# Patient Record
Sex: FEMALE | Race: BLACK OR AFRICAN AMERICAN | NOT HISPANIC OR LATINO | Employment: FULL TIME | URBAN - METROPOLITAN AREA
[De-identification: names, ages, dates, MRNs, and addresses within clinical notes are randomized per-mention and may not be internally consistent; named-entity substitution may affect disease eponyms.]

---

## 2017-01-05 ENCOUNTER — LAB VISIT (OUTPATIENT)
Dept: LAB | Facility: HOSPITAL | Age: 52
End: 2017-01-05
Payer: COMMERCIAL

## 2017-01-05 ENCOUNTER — INFUSION (OUTPATIENT)
Dept: INFUSION THERAPY | Facility: HOSPITAL | Age: 52
End: 2017-01-05
Attending: INTERNAL MEDICINE
Payer: COMMERCIAL

## 2017-01-05 VITALS
HEART RATE: 76 BPM | SYSTOLIC BLOOD PRESSURE: 152 MMHG | TEMPERATURE: 98 F | DIASTOLIC BLOOD PRESSURE: 91 MMHG | RESPIRATION RATE: 18 BRPM

## 2017-01-05 DIAGNOSIS — C25.2 MALIGNANT NEOPLASM OF TAIL OF PANCREAS: Primary | ICD-10-CM

## 2017-01-05 DIAGNOSIS — C25.2 MALIGNANT NEOPLASM OF TAIL OF PANCREAS: ICD-10-CM

## 2017-01-05 DIAGNOSIS — Z09 CHEMOTHERAPY FOLLOW-UP EXAMINATION: ICD-10-CM

## 2017-01-05 LAB
ALBUMIN SERPL BCP-MCNC: 3.2 G/DL
ALP SERPL-CCNC: 66 U/L
ALT SERPL W/O P-5'-P-CCNC: 13 U/L
ANION GAP SERPL CALC-SCNC: 7 MMOL/L
AST SERPL-CCNC: 22 U/L
BILIRUB SERPL-MCNC: 0.4 MG/DL
BUN SERPL-MCNC: 12 MG/DL
CALCIUM SERPL-MCNC: 9.4 MG/DL
CANCER AG19-9 SERPL-ACNC: ABNORMAL U/ML
CHLORIDE SERPL-SCNC: 103 MMOL/L
CO2 SERPL-SCNC: 30 MMOL/L
CREAT SERPL-MCNC: 0.7 MG/DL
ERYTHROCYTE [DISTWIDTH] IN BLOOD BY AUTOMATED COUNT: 17.1 %
EST. GFR  (AFRICAN AMERICAN): >60 ML/MIN/1.73 M^2
EST. GFR  (NON AFRICAN AMERICAN): >60 ML/MIN/1.73 M^2
GLUCOSE SERPL-MCNC: 96 MG/DL
HCT VFR BLD AUTO: 33.7 %
HGB BLD-MCNC: 11.1 G/DL
MCH RBC QN AUTO: 28.5 PG
MCHC RBC AUTO-ENTMCNC: 32.9 %
MCV RBC AUTO: 87 FL
NEUTROPHILS # BLD AUTO: 2.2 K/UL
PLATELET # BLD AUTO: 211 K/UL
PMV BLD AUTO: 9 FL
POTASSIUM SERPL-SCNC: 4.5 MMOL/L
PROT SERPL-MCNC: 7.2 G/DL
RBC # BLD AUTO: 3.89 M/UL
SODIUM SERPL-SCNC: 140 MMOL/L
WBC # BLD AUTO: 4.42 K/UL

## 2017-01-05 PROCEDURE — 36415 COLL VENOUS BLD VENIPUNCTURE: CPT

## 2017-01-05 PROCEDURE — 86301 IMMUNOASSAY TUMOR CA 19-9: CPT

## 2017-01-05 PROCEDURE — 85027 COMPLETE CBC AUTOMATED: CPT

## 2017-01-05 PROCEDURE — 80053 COMPREHEN METABOLIC PANEL: CPT

## 2017-01-05 PROCEDURE — 96367 TX/PROPH/DG ADDL SEQ IV INF: CPT

## 2017-01-05 PROCEDURE — 96417 CHEMO IV INFUS EACH ADDL SEQ: CPT

## 2017-01-05 PROCEDURE — 25000003 PHARM REV CODE 250: Performed by: INTERNAL MEDICINE

## 2017-01-05 PROCEDURE — 96413 CHEMO IV INFUSION 1 HR: CPT

## 2017-01-05 PROCEDURE — 63600175 PHARM REV CODE 636 W HCPCS: Performed by: INTERNAL MEDICINE

## 2017-01-05 RX ORDER — SODIUM CHLORIDE 0.9 % (FLUSH) 0.9 %
10 SYRINGE (ML) INJECTION
Status: DISCONTINUED | OUTPATIENT
Start: 2017-01-05 | End: 2017-01-05 | Stop reason: HOSPADM

## 2017-01-05 RX ORDER — HEPARIN 100 UNIT/ML
500 SYRINGE INTRAVENOUS
Status: DISCONTINUED | OUTPATIENT
Start: 2017-01-05 | End: 2017-01-05 | Stop reason: HOSPADM

## 2017-01-05 RX ADMIN — GEMCITABINE HYDROCHLORIDE 1860 MG: 200 INJECTION, POWDER, LYOPHILIZED, FOR SOLUTION INTRAVENOUS at 05:01

## 2017-01-05 RX ADMIN — PACLITAXEL 230 MG: 100 INJECTION, POWDER, LYOPHILIZED, FOR SUSPENSION INTRAVENOUS at 04:01

## 2017-01-05 RX ADMIN — HEPARIN 500 UNITS: 100 SYRINGE at 05:01

## 2017-01-05 RX ADMIN — SODIUM CHLORIDE: 0.9 INJECTION, SOLUTION INTRAVENOUS at 03:01

## 2017-01-05 RX ADMIN — GRANISETRON HYDROCHLORIDE 1 MG: 0.1 INJECTION, SOLUTION INTRAVENOUS at 03:01

## 2017-01-05 RX ADMIN — SODIUM CHLORIDE, PRESERVATIVE FREE 10 ML: 5 INJECTION INTRAVENOUS at 05:01

## 2017-01-05 NOTE — PLAN OF CARE
Problem: Patient Care Overview (Adult)  Goal: Plan of Care Review  Outcome: Ongoing (interventions implemented as appropriate)  Patient tolerated treatment well. Discharged without complaints or S/S of adverse event. AVS decline. Reviewed how to best take antiemetics.  Instructed to call provider for any questions or concerns.

## 2017-01-05 NOTE — PLAN OF CARE
Problem: Patient Care Overview (Adult)  Goal: Adult Individualization and Mutuality  PAC- no blood return- port study okay   Outcome: Ongoing (interventions implemented as appropriate)  .Labs , hx, and medications reviewed. Assessment completed. Discussed plan of care with patient. Patient in agreement. Chair reclined and warm blanket and snack offered.

## 2017-01-06 ENCOUNTER — INFUSION (OUTPATIENT)
Dept: INFUSION THERAPY | Facility: HOSPITAL | Age: 52
End: 2017-01-06
Attending: INTERNAL MEDICINE
Payer: COMMERCIAL

## 2017-01-06 DIAGNOSIS — C25.2 MALIGNANT NEOPLASM OF TAIL OF PANCREAS: Primary | ICD-10-CM

## 2017-01-06 PROCEDURE — 96372 THER/PROPH/DIAG INJ SC/IM: CPT

## 2017-01-06 PROCEDURE — 63600175 PHARM REV CODE 636 W HCPCS: Performed by: INTERNAL MEDICINE

## 2017-01-06 RX ADMIN — FILGRASTIM 480 MCG: 480 INJECTION, SOLUTION INTRAVENOUS; SUBCUTANEOUS at 02:01

## 2017-01-10 ENCOUNTER — TELEPHONE (OUTPATIENT)
Dept: GENETICS | Facility: CLINIC | Age: 52
End: 2017-01-10

## 2017-01-10 DIAGNOSIS — R11.0 NAUSEA: ICD-10-CM

## 2017-01-10 DIAGNOSIS — C25.0 MALIGNANT NEOPLASM OF HEAD OF PANCREAS: ICD-10-CM

## 2017-01-10 DIAGNOSIS — C25.2 MALIGNANT NEOPLASM OF TAIL OF PANCREAS: Primary | ICD-10-CM

## 2017-01-10 DIAGNOSIS — R52 PAIN: ICD-10-CM

## 2017-01-10 RX ORDER — ONDANSETRON HYDROCHLORIDE 8 MG/1
8 TABLET, FILM COATED ORAL EVERY 8 HOURS PRN
Qty: 60 TABLET | Refills: 2 | Status: SHIPPED | OUTPATIENT
Start: 2017-01-10 | End: 2018-01-10

## 2017-01-10 RX ORDER — MORPHINE SULFATE 100 MG/1
100 TABLET, FILM COATED, EXTENDED RELEASE ORAL 2 TIMES DAILY
Qty: 60 TABLET | Refills: 0 | Status: SHIPPED | OUTPATIENT
Start: 2017-01-10 | End: 2017-01-10 | Stop reason: SDUPTHER

## 2017-01-10 RX ORDER — MORPHINE SULFATE 100 MG/1
100 TABLET, FILM COATED, EXTENDED RELEASE ORAL 2 TIMES DAILY
Qty: 60 TABLET | Refills: 0 | OUTPATIENT
Start: 2017-01-10

## 2017-01-10 RX ORDER — ONDANSETRON HYDROCHLORIDE 8 MG/1
8 TABLET, FILM COATED ORAL EVERY 8 HOURS PRN
Qty: 60 TABLET | Refills: 2 | OUTPATIENT
Start: 2017-01-10 | End: 2018-01-10

## 2017-01-10 RX ORDER — MORPHINE SULFATE 100 MG/1
100 TABLET, FILM COATED, EXTENDED RELEASE ORAL 2 TIMES DAILY
Qty: 60 TABLET | Refills: 0 | Status: SHIPPED | OUTPATIENT
Start: 2017-01-10 | End: 2017-02-14 | Stop reason: SDUPTHER

## 2017-01-10 NOTE — TELEPHONE ENCOUNTER
Spoke to the patient to disclose results of the pancreatic cancer panel. 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 We will discuss this result in more detail during our appt on 1/17 at 2pm.

## 2017-01-11 ENCOUNTER — LAB VISIT (OUTPATIENT)
Dept: LAB | Facility: HOSPITAL | Age: 52
End: 2017-01-11
Attending: INTERNAL MEDICINE
Payer: COMMERCIAL

## 2017-01-11 ENCOUNTER — OFFICE VISIT (OUTPATIENT)
Dept: HEMATOLOGY/ONCOLOGY | Facility: CLINIC | Age: 52
End: 2017-01-11
Payer: COMMERCIAL

## 2017-01-11 VITALS
HEART RATE: 80 BPM | WEIGHT: 158 LBS | DIASTOLIC BLOOD PRESSURE: 70 MMHG | SYSTOLIC BLOOD PRESSURE: 118 MMHG | TEMPERATURE: 98 F | BODY MASS INDEX: 26.98 KG/M2 | HEIGHT: 64 IN

## 2017-01-11 DIAGNOSIS — C25.2 MALIGNANT NEOPLASM OF TAIL OF PANCREAS: ICD-10-CM

## 2017-01-11 DIAGNOSIS — Z09 CHEMOTHERAPY FOLLOW-UP EXAMINATION: ICD-10-CM

## 2017-01-11 DIAGNOSIS — C25.2 MALIGNANT NEOPLASM OF TAIL OF PANCREAS: Primary | ICD-10-CM

## 2017-01-11 LAB
ALBUMIN SERPL BCP-MCNC: 3.3 G/DL
ALP SERPL-CCNC: 67 U/L
ALT SERPL W/O P-5'-P-CCNC: 17 U/L
ANION GAP SERPL CALC-SCNC: 6 MMOL/L
AST SERPL-CCNC: 21 U/L
BILIRUB SERPL-MCNC: 0.5 MG/DL
BUN SERPL-MCNC: 14 MG/DL
CALCIUM SERPL-MCNC: 9.6 MG/DL
CHLORIDE SERPL-SCNC: 100 MMOL/L
CO2 SERPL-SCNC: 31 MMOL/L
CREAT SERPL-MCNC: 0.7 MG/DL
ERYTHROCYTE [DISTWIDTH] IN BLOOD BY AUTOMATED COUNT: 16.8 %
EST. GFR  (AFRICAN AMERICAN): >60 ML/MIN/1.73 M^2
EST. GFR  (NON AFRICAN AMERICAN): >60 ML/MIN/1.73 M^2
GLUCOSE SERPL-MCNC: 119 MG/DL
HCT VFR BLD AUTO: 31.9 %
HGB BLD-MCNC: 10.7 G/DL
MCH RBC QN AUTO: 28.9 PG
MCHC RBC AUTO-ENTMCNC: 33.5 %
MCV RBC AUTO: 86 FL
NEUTROPHILS # BLD AUTO: 2.7 K/UL
PLATELET # BLD AUTO: 146 K/UL
PMV BLD AUTO: 9.2 FL
POTASSIUM SERPL-SCNC: 4.2 MMOL/L
PROT SERPL-MCNC: 7.1 G/DL
RBC # BLD AUTO: 3.7 M/UL
SODIUM SERPL-SCNC: 137 MMOL/L
WBC # BLD AUTO: 4.79 K/UL

## 2017-01-11 PROCEDURE — 3074F SYST BP LT 130 MM HG: CPT | Mod: S$GLB,,, | Performed by: INTERNAL MEDICINE

## 2017-01-11 PROCEDURE — 80053 COMPREHEN METABOLIC PANEL: CPT

## 2017-01-11 PROCEDURE — 99214 OFFICE O/P EST MOD 30 MIN: CPT | Mod: S$GLB,,, | Performed by: INTERNAL MEDICINE

## 2017-01-11 PROCEDURE — 85027 COMPLETE CBC AUTOMATED: CPT

## 2017-01-11 PROCEDURE — 99999 PR PBB SHADOW E&M-EST. PATIENT-LVL III: CPT | Mod: PBBFAC,,, | Performed by: INTERNAL MEDICINE

## 2017-01-11 PROCEDURE — 36415 COLL VENOUS BLD VENIPUNCTURE: CPT

## 2017-01-11 PROCEDURE — 3078F DIAST BP <80 MM HG: CPT | Mod: S$GLB,,, | Performed by: INTERNAL MEDICINE

## 2017-01-11 PROCEDURE — 1159F MED LIST DOCD IN RCRD: CPT | Mod: S$GLB,,, | Performed by: INTERNAL MEDICINE

## 2017-01-11 RX ORDER — HYDROMORPHONE HYDROCHLORIDE 4 MG/1
4 TABLET ORAL EVERY 6 HOURS PRN
Qty: 90 TABLET | Refills: 0 | Status: ON HOLD | OUTPATIENT
Start: 2017-01-11 | End: 2017-03-08 | Stop reason: HOSPADM

## 2017-01-11 RX ORDER — PROMETHAZINE HYDROCHLORIDE 25 MG/1
25 TABLET ORAL EVERY 6 HOURS PRN
Qty: 30 TABLET | Refills: 1 | Status: SHIPPED | OUTPATIENT
Start: 2017-01-11

## 2017-01-11 NOTE — PROGRESS NOTES
PATIENT: Camilo Johnson  MRN: [id number]  DATE: 2017      Diagnosis:   1. Malignant neoplasm of tail of pancreas    2. 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 Johnson is a 51 y.o. female who returns for follow up for pancreatic cancer.  She is a former patient of Dr. Fontenot.  She is currently on treatment with gemcitabine and nab paclitaxel.  She complains of some ongoing abdominal pain and bloating.  This is relieved with Dilaudid, however, sometimes dilated wears off too fast.  Additionally she complains of some numbness to her toes.  No other new complaints.      Past Medical History:   Past Medical History   Diagnosis Date    Chemotherapy follow-up examination 2016    HTN (hypertension)     Pancreatic mass     Sickle cell trait        Past Surgical HIstory:   Past Surgical History   Procedure Laterality Date     section      Tubal ligation      Breast surgery       breast reduction       Family History:   Family History   Problem Relation Age of Onset    Diabetes Mother     Stroke Father     Hypertension Father     Heart disease Father     Hypertension Brother     Diabetes Brother     Hypertension Brother        Social History:  reports that she has never smoked. She has never used smokeless tobacco. She reports that she drinks alcohol. She reports that she does not use illicit drugs.    Allergies:  Review of patient's allergies indicates:  No Known Allergies    Medications:  Current Outpatient Prescriptions   Medication Sig Dispense Refill    gabapentin (NEURONTIN) 300 MG capsule Take 1 capsule (300 mg total) by mouth 2 (two) times daily. 60 capsule 5  "   HYDROmorphone (DILAUDID) 4 MG tablet Take 1 tablet (4 mg total) by mouth every 6 (six) hours as needed for Pain. 90 tablet 0    morphine (MS CONTIN) 100 MG 12 hr tablet Take 1 tablet (100 mg total) by mouth 2 (two) times daily. 60 tablet 0    ondansetron (ZOFRAN) 8 MG tablet Take 1 tablet (8 mg total) by mouth every 8 (eight) hours as needed for Nausea. 60 tablet 2    promethazine (PHENERGAN) 25 MG tablet Take 1 tablet (25 mg total) by mouth every 6 (six) hours as needed for Nausea. 30 tablet 1    vitamin D3-folic acid 5,000-1 unit-mg Tab Take by mouth once daily.       No current facility-administered medications for this visit.        Review of Systems   Constitutional: Negative for chills, fever and unexpected weight change.   HENT: Negative for congestion, hearing loss and nosebleeds.    Eyes: Negative for visual disturbance.   Respiratory: Negative for cough and shortness of breath.    Cardiovascular: Negative for chest pain and palpitations.   Gastrointestinal: Positive for abdominal pain. Negative for blood in stool, constipation, diarrhea, nausea and vomiting.   Genitourinary: Negative for dysuria.   Musculoskeletal: Negative for back pain and gait problem.   Skin: Negative for color change and rash.   Neurological: Positive for numbness. Negative for dizziness, weakness and headaches.   Hematological: Negative for adenopathy. Does not bruise/bleed easily.   Psychiatric/Behavioral: Negative for confusion.       ECOG Performance Status:   ECOG SCORE    1 - Restricted in strenuous activity-ambulatory and able to carry out work of a light nature          Objective:      Vitals:   Vitals:    01/11/17 1309   BP: 118/70   Pulse: 80   Temp: 98 °F (36.7 °C)   Weight: 71.7 kg (158 lb)   Height: 5' 4" (1.626 m)     BMI: Body mass index is 27.12 kg/(m^2).    Physical Exam   Constitutional: She is oriented to person, place, and time. She appears well-developed and well-nourished. No distress.   HENT:   Head: " Normocephalic.   Mouth/Throat: No oropharyngeal exudate.   Eyes: EOM are normal. No scleral icterus.   Neck: Neck supple. No tracheal deviation present. No thyromegaly present.   Cardiovascular: Normal rate and regular rhythm.    Pulmonary/Chest: Effort normal and breath sounds normal. No respiratory distress. She has no wheezes. She has no rales.   Abdominal: Soft. She exhibits no distension and no mass. There is tenderness. There is no rebound and no guarding.   Musculoskeletal: Normal range of motion. She exhibits no edema.   Lymphadenopathy:     She has no cervical adenopathy.   Neurological: She is alert and oriented to person, place, and time. No cranial nerve deficit.   Skin: Skin is warm and dry.   Psychiatric: She has a normal mood and affect.       Laboratory Data:  Lab Visit on 01/11/2017   Component Date Value Ref Range Status    Sodium 01/11/2017 137  136 - 145 mmol/L Final    Potassium 01/11/2017 4.2  3.5 - 5.1 mmol/L Final    Chloride 01/11/2017 100  95 - 110 mmol/L Final    CO2 01/11/2017 31* 23 - 29 mmol/L Final    Glucose 01/11/2017 119* 70 - 110 mg/dL Final    BUN, Bld 01/11/2017 14  6 - 20 mg/dL Final    Creatinine 01/11/2017 0.7  0.5 - 1.4 mg/dL Final    Calcium 01/11/2017 9.6  8.7 - 10.5 mg/dL Final    Total Protein 01/11/2017 7.1  6.0 - 8.4 g/dL Final    Albumin 01/11/2017 3.3* 3.5 - 5.2 g/dL Final    Total Bilirubin 01/11/2017 0.5  0.1 - 1.0 mg/dL Final    Comment: For infants and newborns, interpretation of results should be based  on gestational age, weight and in agreement with clinical  observations.  Premature Infant recommended reference ranges:  Up to 24 hours.............<8.0 mg/dL  Up to 48 hours............<12.0 mg/dL  3-5 days..................<15.0 mg/dL  6-29 days.................<15.0 mg/dL      Alkaline Phosphatase 01/11/2017 67  55 - 135 U/L Final    AST 01/11/2017 21  10 - 40 U/L Final    ALT 01/11/2017 17  10 - 44 U/L Final    Anion Gap 01/11/2017 6* 8 - 16  mmol/L Final    eGFR if African American 01/11/2017 >60.0  >60 mL/min/1.73 m^2 Final    eGFR if non African American 01/11/2017 >60.0  >60 mL/min/1.73 m^2 Final    Comment: Calculation used to obtain the estimated glomerular filtration  rate (eGFR) is the CKD-EPI equation. Since race is unknown   in our information system, the eGFR values for   -American and Non--American patients are given   for each creatinine result.      WBC 01/11/2017 4.79  3.90 - 12.70 K/uL Final    RBC 01/11/2017 3.70* 4.00 - 5.40 M/uL Final    Hemoglobin 01/11/2017 10.7* 12.0 - 16.0 g/dL Final    Hematocrit 01/11/2017 31.9* 37.0 - 48.5 % Final    MCV 01/11/2017 86  82 - 98 fL Final    MCH 01/11/2017 28.9  27.0 - 31.0 pg Final    MCHC 01/11/2017 33.5  32.0 - 36.0 % Final    RDW 01/11/2017 16.8* 11.5 - 14.5 % Final    Platelets 01/11/2017 146* 150 - 350 K/uL Final    MPV 01/11/2017 9.2  9.2 - 12.9 fL Final    Gran # 01/11/2017 2.7  1.8 - 7.7 K/uL Final    Comment: The ANC is based on a white cell differential from an   automated cell counter. It has not been microscopically   reviewed for the presence of abnormal cells. Clinical   correlation is required.     Lab Visit on 01/05/2017   Component Date Value Ref Range Status    Sodium 01/05/2017 140  136 - 145 mmol/L Final    Potassium 01/05/2017 4.5  3.5 - 5.1 mmol/L Final    Chloride 01/05/2017 103  95 - 110 mmol/L Final    CO2 01/05/2017 30* 23 - 29 mmol/L Final    Glucose 01/05/2017 96  70 - 110 mg/dL Final    BUN, Bld 01/05/2017 12  6 - 20 mg/dL Final    Creatinine 01/05/2017 0.7  0.5 - 1.4 mg/dL Final    Calcium 01/05/2017 9.4  8.7 - 10.5 mg/dL Final    Total Protein 01/05/2017 7.2  6.0 - 8.4 g/dL Final    Albumin 01/05/2017 3.2* 3.5 - 5.2 g/dL Final    Total Bilirubin 01/05/2017 0.4  0.1 - 1.0 mg/dL Final    Comment: For infants and newborns, interpretation of results should be based  on gestational age, weight and in agreement with  clinical  observations.  Premature Infant recommended reference ranges:  Up to 24 hours.............<8.0 mg/dL  Up to 48 hours............<12.0 mg/dL  3-5 days..................<15.0 mg/dL  6-29 days.................<15.0 mg/dL      Alkaline Phosphatase 01/05/2017 66  55 - 135 U/L Final    AST 01/05/2017 22  10 - 40 U/L Final    ALT 01/05/2017 13  10 - 44 U/L Final    Anion Gap 01/05/2017 7* 8 - 16 mmol/L Final    eGFR if African American 01/05/2017 >60.0  >60 mL/min/1.73 m^2 Final    eGFR if non African American 01/05/2017 >60.0  >60 mL/min/1.73 m^2 Final    Comment: Calculation used to obtain the estimated glomerular filtration  rate (eGFR) is the CKD-EPI equation. Since race is unknown   in our information system, the eGFR values for   -American and Non--American patients are given   for each creatinine result.      WBC 01/05/2017 4.42  3.90 - 12.70 K/uL Final    RBC 01/05/2017 3.89* 4.00 - 5.40 M/uL Final    Hemoglobin 01/05/2017 11.1* 12.0 - 16.0 g/dL Final    Hematocrit 01/05/2017 33.7* 37.0 - 48.5 % Final    MCV 01/05/2017 87  82 - 98 fL Final    MCH 01/05/2017 28.5  27.0 - 31.0 pg Final    MCHC 01/05/2017 32.9  32.0 - 36.0 % Final    RDW 01/05/2017 17.1* 11.5 - 14.5 % Final    Platelets 01/05/2017 211  150 - 350 K/uL Final    MPV 01/05/2017 9.0* 9.2 - 12.9 fL Final    Gran # 01/05/2017 2.2  1.8 - 7.7 K/uL Final    Comment: The ANC is based on a white cell differential from an   automated cell counter. It has not been microscopically   reviewed for the presence of abnormal cells. Clinical   correlation is required.      CA 19-9 01/05/2017 >71014.0* 2.0 - 40.0 U/mL Final         Imaging:        Assessment:       1. Malignant neoplasm of tail of pancreas    2. Chemotherapy follow-up examination           Plan:      Mrs. Johnson has ongoing pain in the abdomen and will increase the use of breakthrough medication.  We may need to go up on her long-acting morphine.  Her labs are  acceptable for chemotherapy tomorrow.  I have noted an increase in her CA-19-9 since October 2016.  This is concerning for progression.  I had to repeat a CT of the abdomen next week and see back at that time.  We'll discuss with our phase I division if there are clinical trials available.  We have had an extensive discussion about clinical trials and the different phases.  She will increase Neurontin to 300 mg 3 times a day.  All questions were answered and she is agreeable with this plan.      Sebas Zaman DO, FACP  Hematology & Oncology  [facility] [place] [address]  ph. [phone]  Fax. [phone]    25 minutes were spent in coordination of patient's care, record review and counseling.  More than 50% of the time was face-to-face.

## 2017-01-12 ENCOUNTER — INFUSION (OUTPATIENT)
Dept: INFUSION THERAPY | Facility: HOSPITAL | Age: 52
End: 2017-01-12
Attending: INTERNAL MEDICINE
Payer: COMMERCIAL

## 2017-01-12 VITALS
DIASTOLIC BLOOD PRESSURE: 87 MMHG | HEART RATE: 88 BPM | SYSTOLIC BLOOD PRESSURE: 124 MMHG | RESPIRATION RATE: 18 BRPM | TEMPERATURE: 98 F

## 2017-01-12 DIAGNOSIS — C25.2 MALIGNANT NEOPLASM OF TAIL OF PANCREAS: Primary | ICD-10-CM

## 2017-01-12 PROCEDURE — 63600175 PHARM REV CODE 636 W HCPCS: Performed by: INTERNAL MEDICINE

## 2017-01-12 PROCEDURE — 96417 CHEMO IV INFUS EACH ADDL SEQ: CPT

## 2017-01-12 PROCEDURE — 25000003 PHARM REV CODE 250: Performed by: INTERNAL MEDICINE

## 2017-01-12 PROCEDURE — 96413 CHEMO IV INFUSION 1 HR: CPT

## 2017-01-12 PROCEDURE — 96367 TX/PROPH/DG ADDL SEQ IV INF: CPT

## 2017-01-12 RX ORDER — HEPARIN 100 UNIT/ML
500 SYRINGE INTRAVENOUS
Status: DISCONTINUED | OUTPATIENT
Start: 2017-01-12 | End: 2017-01-12 | Stop reason: HOSPADM

## 2017-01-12 RX ORDER — SODIUM CHLORIDE 0.9 % (FLUSH) 0.9 %
10 SYRINGE (ML) INJECTION
Status: DISCONTINUED | OUTPATIENT
Start: 2017-01-12 | End: 2017-01-12 | Stop reason: HOSPADM

## 2017-01-12 RX ORDER — ONDANSETRON HCL IN 0.9 % NACL 8 MG/50 ML
8 INTRAVENOUS SOLUTION, PIGGYBACK (ML) INTRAVENOUS
Status: COMPLETED | OUTPATIENT
Start: 2017-01-12 | End: 2017-01-12

## 2017-01-12 RX ADMIN — PACLITAXEL 230 MG: 100 INJECTION, POWDER, LYOPHILIZED, FOR SUSPENSION INTRAVENOUS at 03:01

## 2017-01-12 RX ADMIN — SODIUM CHLORIDE 8 MG: 9 INJECTION, SOLUTION INTRAVENOUS at 02:01

## 2017-01-12 RX ADMIN — SODIUM CHLORIDE, PRESERVATIVE FREE 10 ML: 5 INJECTION INTRAVENOUS at 04:01

## 2017-01-12 RX ADMIN — GEMCITABINE HYDROCHLORIDE 1800 MG: 200 INJECTION, POWDER, LYOPHILIZED, FOR SOLUTION INTRAVENOUS at 03:01

## 2017-01-12 RX ADMIN — HEPARIN 500 UNITS: 100 SYRINGE at 04:01

## 2017-01-12 RX ADMIN — SODIUM CHLORIDE: 0.9 INJECTION, SOLUTION INTRAVENOUS at 02:01

## 2017-01-12 NOTE — PLAN OF CARE
Problem: Patient Care Overview (Adult)  Goal: Plan of Care Review  Outcome: Ongoing (interventions implemented as appropriate)  Tolerated treatment well.  Advised to call MD for any problems or concerns.  AVS given.  RTC on tomorrow for Neupogen injection. NAD noted upon discharge.

## 2017-01-13 ENCOUNTER — PATIENT MESSAGE (OUTPATIENT)
Dept: HEMATOLOGY/ONCOLOGY | Facility: CLINIC | Age: 52
End: 2017-01-13

## 2017-01-13 ENCOUNTER — INFUSION (OUTPATIENT)
Dept: INFUSION THERAPY | Facility: HOSPITAL | Age: 52
End: 2017-01-13
Attending: INTERNAL MEDICINE
Payer: COMMERCIAL

## 2017-01-13 DIAGNOSIS — C25.2 MALIGNANT NEOPLASM OF TAIL OF PANCREAS: Primary | ICD-10-CM

## 2017-01-13 PROCEDURE — 96372 THER/PROPH/DIAG INJ SC/IM: CPT

## 2017-01-13 PROCEDURE — 63600175 PHARM REV CODE 636 W HCPCS: Performed by: INTERNAL MEDICINE

## 2017-01-13 RX ADMIN — FILGRASTIM 480 MCG: 480 INJECTION, SOLUTION INTRAVENOUS; SUBCUTANEOUS at 01:01

## 2017-01-17 ENCOUNTER — OFFICE VISIT (OUTPATIENT)
Dept: GENETICS | Facility: CLINIC | Age: 52
End: 2017-01-17
Payer: COMMERCIAL

## 2017-01-17 ENCOUNTER — HOSPITAL ENCOUNTER (OUTPATIENT)
Dept: RADIOLOGY | Facility: HOSPITAL | Age: 52
Discharge: HOME | End: 2017-01-17
Attending: INTERNAL MEDICINE
Payer: COMMERCIAL

## 2017-01-17 VITALS — HEIGHT: 64 IN | BODY MASS INDEX: 26.98 KG/M2 | WEIGHT: 158.06 LBS

## 2017-01-17 DIAGNOSIS — C25.2 MALIGNANT NEOPLASM OF TAIL OF PANCREAS: ICD-10-CM

## 2017-01-17 DIAGNOSIS — Z09 CHEMOTHERAPY FOLLOW-UP EXAMINATION: ICD-10-CM

## 2017-01-17 DIAGNOSIS — Z80.9 FAMILY HISTORY OF CANCER: ICD-10-CM

## 2017-01-17 DIAGNOSIS — R52 PAIN: ICD-10-CM

## 2017-01-17 DIAGNOSIS — C25.9 MALIGNANT NEOPLASM OF PANCREAS, UNSPECIFIED LOCATION OF MALIGNANCY: Primary | ICD-10-CM

## 2017-01-17 PROCEDURE — 74177 CT ABD & PELVIS W/CONTRAST: CPT | Mod: TC

## 2017-01-17 PROCEDURE — 99999 PR PBB SHADOW E&M-EST. PATIENT-LVL II: CPT | Mod: PBBFAC,,,

## 2017-01-17 PROCEDURE — 99499 UNLISTED E&M SERVICE: CPT | Mod: S$GLB,,, | Performed by: MEDICAL GENETICS

## 2017-01-17 PROCEDURE — 74177 CT ABD & PELVIS W/CONTRAST: CPT | Mod: 26,,, | Performed by: RADIOLOGY

## 2017-01-17 PROCEDURE — 25500020 PHARM REV CODE 255: Performed by: INTERNAL MEDICINE

## 2017-01-17 PROCEDURE — 71260 CT THORAX DX C+: CPT | Mod: 26,,, | Performed by: RADIOLOGY

## 2017-01-17 PROCEDURE — 71260 CT THORAX DX C+: CPT | Mod: TC

## 2017-01-17 PROCEDURE — 96040 PR GENETIC COUNSELING, EACH 30 MIN: CPT | Mod: S$GLB,,, | Performed by: MEDICAL GENETICS

## 2017-01-17 RX ADMIN — IOHEXOL 30 ML: 350 INJECTION, SOLUTION INTRAVENOUS at 10:01

## 2017-01-17 RX ADMIN — IOHEXOL 75 ML: 350 INJECTION, SOLUTION INTRAVENOUS at 12:01

## 2017-01-18 ENCOUNTER — TELEPHONE (OUTPATIENT)
Dept: HEMATOLOGY/ONCOLOGY | Facility: CLINIC | Age: 52
End: 2017-01-18

## 2017-01-18 NOTE — TELEPHONE ENCOUNTER
Let patient know what Dr. Zaman's message was about the scan. Patient verbalized understanding.

## 2017-01-18 NOTE — TELEPHONE ENCOUNTER
----- Message from Sebas Zaman DO, FACBETH sent at 1/17/2017  8:32 PM CST -----  Please let Mrs. Johnson know that her scan appears stable with no new lesions seen.

## 2017-01-18 NOTE — PROGRESS NOTES
Camilo Johnson  DOS: 17  : 69  MRN: [id number]        REFERING MD: Gardenia Fontenot MD     REASON FOR CONSULT: We have seen this 52-year-old  female with a personal history of pancreatic cancer. After our last visit, the 16 gene Pancreatic Cancer Panel was completed. 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 Mrs. Johnson presents with  to discuss the results and our recommendations.      HISTORY OF PRESENT ILLNESS: Mrs. Johnson presented to her Ob/Gyn for an evaluation complaining of back/flank pain. CT scan performed on 3/4/16 identified a mass in the pancreas. The patient was seen at [facility] for a second opinion. Pathology showed poorly differentiated adenocarcinoma. Subsequently imaging studies showed metastasis to the lungs and bone. Mrs. Johnson is closely followed by her oncologist.      Mrs. Johnson reports that menarche was at age 13 and her first child was born when she was 29. The patient is getting clinical breast exams as well as yearly mammograms. The patient has never had a breast biopsy. The last mammogram was performed on 16. Past surgical history includes breast reduction at age 29 and tubal ligation. The patient has never had a colonoscopy.      FAMILY HISTORY: At this visit, the 3 generation pedigree scanned in the patients chart was reviewed.  Mrs. Johnson has 2 daughters (age 22 and 20) and a son (age 7). The patient has 7 full brothers and none of them have a history of cancer. The patients parents are  and they had no history of cancer. On the paternal side, there is an aunt with lung cancer (she was a smoker) and a first cousin with colon cancer (age of diagnosis unknown). The remainder of the family history was not consistent with other cancers. Paternal and maternal ancestry is .      IMPRESSION: At this time, we have reviewed the patients medical and  family history and discussed the results of the Pancreatic Cancer Panel (APC, STACY, BRCA1, BRCA2, CDK4, CDKN2A, EPCAM, MLH1, MSH2, MSH6, PALB2, PMS2, STK11, TP53, VHL, XRCC2) completed at [facility]. 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 Pathogenic mutations in this gene are associated with a susceptibility to cutaneous malignant melanoma and pancreatic cancer (exact risk figures not currently known). The variant identified has been observed by another testing laboratory. As of May 19 2016, this variant is classified as a VUS by two different labs ([facility] and [facility] [facility]) in [employer]. We emphasized that the significance of this variant will likely be delineated in the future.      We discussed autosomal dominant inheritance in detail. Each of Mrs. Hercules children have a 1 in 2 or 50% chance of inheriting the VUS in the CDK4 gene. We recommended that the patient continue her treatment as recommended by her team of medical providers. Mrs. Johnson should follow up with us in a year to explore additional testing options and to get updates on the significance of the variant identified. A copy of original test report, educational material and information on available research studies were provided to the patient and her .  Mrs. Johnson expressed understanding and had all her questions answered to her satisfaction.    Time spent: 60 minutes, more than 50% was spent in counseling. The note is in epic.      RECOMMENDATIONS:   1. Follow up in 1 year.                      Time spent: 60 minutes, more than 50% was spent in counseling. The note is in epic.         Stephen Burch M.D.                                                               Brenda Noriega MS  Section Head - Medical Genetics                                                 Genetic Counselor           [facility]                                                                                        [email or web address]

## 2017-01-23 ENCOUNTER — OFFICE VISIT (OUTPATIENT)
Dept: HEMATOLOGY/ONCOLOGY | Facility: CLINIC | Age: 52
End: 2017-01-23
Payer: COMMERCIAL

## 2017-01-23 ENCOUNTER — HOSPITAL ENCOUNTER (OUTPATIENT)
Dept: VASCULAR SURGERY | Facility: CLINIC | Age: 52
Discharge: HOME | End: 2017-01-23
Attending: INTERNAL MEDICINE
Payer: COMMERCIAL

## 2017-01-23 ENCOUNTER — HOSPITAL ENCOUNTER (OUTPATIENT)
Dept: RADIOLOGY | Facility: HOSPITAL | Age: 52
Discharge: HOME | End: 2017-01-23
Attending: INTERNAL MEDICINE
Payer: COMMERCIAL

## 2017-01-23 VITALS
TEMPERATURE: 99 F | WEIGHT: 162.69 LBS | RESPIRATION RATE: 20 BRPM | HEART RATE: 103 BPM | OXYGEN SATURATION: 98 % | BODY MASS INDEX: 27.77 KG/M2 | SYSTOLIC BLOOD PRESSURE: 135 MMHG | DIASTOLIC BLOOD PRESSURE: 84 MMHG | HEIGHT: 64 IN

## 2017-01-23 DIAGNOSIS — R06.02 SOB (SHORTNESS OF BREATH): ICD-10-CM

## 2017-01-23 DIAGNOSIS — R10.13 EPIGASTRIC PAIN: ICD-10-CM

## 2017-01-23 DIAGNOSIS — M79.89 SWELLING OF LOWER EXTREMITY: ICD-10-CM

## 2017-01-23 DIAGNOSIS — C25.2 MALIGNANT NEOPLASM OF TAIL OF PANCREAS: Primary | ICD-10-CM

## 2017-01-23 DIAGNOSIS — C25.2 MALIGNANT NEOPLASM OF TAIL OF PANCREAS: ICD-10-CM

## 2017-01-23 DIAGNOSIS — Z09 CHEMOTHERAPY FOLLOW-UP EXAMINATION: ICD-10-CM

## 2017-01-23 DIAGNOSIS — R60.9 EDEMA, UNSPECIFIED TYPE: ICD-10-CM

## 2017-01-23 PROCEDURE — 99215 OFFICE O/P EST HI 40 MIN: CPT | Mod: S$GLB,,, | Performed by: INTERNAL MEDICINE

## 2017-01-23 PROCEDURE — 3079F DIAST BP 80-89 MM HG: CPT | Mod: S$GLB,,, | Performed by: INTERNAL MEDICINE

## 2017-01-23 PROCEDURE — 25500020 PHARM REV CODE 255: Performed by: INTERNAL MEDICINE

## 2017-01-23 PROCEDURE — 71275 CT ANGIOGRAPHY CHEST: CPT | Mod: TC

## 2017-01-23 PROCEDURE — 99999 PR PBB SHADOW E&M-EST. PATIENT-LVL IV: CPT | Mod: PBBFAC,,, | Performed by: INTERNAL MEDICINE

## 2017-01-23 PROCEDURE — 3075F SYST BP GE 130 - 139MM HG: CPT | Mod: S$GLB,,, | Performed by: INTERNAL MEDICINE

## 2017-01-23 PROCEDURE — 1159F MED LIST DOCD IN RCRD: CPT | Mod: S$GLB,,, | Performed by: INTERNAL MEDICINE

## 2017-01-23 PROCEDURE — 93970 EXTREMITY STUDY: CPT | Mod: S$GLB,,, | Performed by: SURGERY

## 2017-01-23 PROCEDURE — 71275 CT ANGIOGRAPHY CHEST: CPT | Mod: 26,,, | Performed by: RADIOLOGY

## 2017-01-23 RX ORDER — HEPARIN 100 UNIT/ML
500 SYRINGE INTRAVENOUS
Status: CANCELLED | OUTPATIENT
Start: 2017-01-31

## 2017-01-23 RX ORDER — CLINDAMYCIN HYDROCHLORIDE 300 MG/1
300 CAPSULE ORAL 3 TIMES DAILY
Qty: 21 CAPSULE | Refills: 0 | Status: ON HOLD | OUTPATIENT
Start: 2017-01-23 | End: 2017-01-31 | Stop reason: HOSPADM

## 2017-01-23 RX ORDER — SENNOSIDES 8.6 MG/1
2 TABLET ORAL
COMMUNITY
End: 2017-01-25

## 2017-01-23 RX ORDER — HEPARIN 100 UNIT/ML
500 SYRINGE INTRAVENOUS
Status: CANCELLED | OUTPATIENT
Start: 2017-02-23

## 2017-01-23 RX ORDER — HEPARIN 100 UNIT/ML
500 SYRINGE INTRAVENOUS
Status: CANCELLED | OUTPATIENT
Start: 2017-03-08

## 2017-01-23 RX ORDER — SODIUM CHLORIDE 0.9 % (FLUSH) 0.9 %
10 SYRINGE (ML) INJECTION
Status: CANCELLED | OUTPATIENT
Start: 2017-02-23

## 2017-01-23 RX ORDER — SODIUM CHLORIDE 0.9 % (FLUSH) 0.9 %
10 SYRINGE (ML) INJECTION
Status: CANCELLED | OUTPATIENT
Start: 2017-01-31

## 2017-01-23 RX ORDER — SODIUM CHLORIDE 0.9 % (FLUSH) 0.9 %
10 SYRINGE (ML) INJECTION
Status: CANCELLED | OUTPATIENT
Start: 2017-03-08

## 2017-01-23 RX ORDER — POLYETHYLENE GLYCOL 3350 17 G/17G
POWDER, FOR SOLUTION ORAL
COMMUNITY
End: 2017-01-25 | Stop reason: SDUPTHER

## 2017-01-23 RX ADMIN — IOHEXOL 75 ML: 350 INJECTION, SOLUTION INTRAVENOUS at 05:01

## 2017-01-23 NOTE — PROGRESS NOTES
PATIENT: Camilo Johnson  MRN: [id number]  DATE: 2017      Diagnosis:   1. Malignant neoplasm of tail of pancreas    2. Chemotherapy follow-up examination    3. Swelling of lower extremity    4. Epigastric pain    5. SOB (shortness of breath)    6. Paronychia, unspecified laterality        Chief Complaint: Pancreatic Cancer      Oncologic History:      Oncologic History Pancreatic cancer diagnosed 3/2016   Metastatic disease to lung and bone     Oncologic Treatment FOLFIRINOX ( discontinued due to progression)  Gemcitabine/nab-paclitaxel 2016     Pathology Adenocarcinoma          Subjective:    Interval History: Ms. Johnson is a 52 y.o. female who returns for follow up for pancreatic cancer.  She is a former patient of Dr. Fontenot.  She is currently on treatment with gemcitabine and nab paclitaxel.  She complains of some ongoing abdominal pain and bloating.  This is relieved with Dilaudid, however, sometimes dilated wears off too fast.  Additionally she complains of some numbness to her toes.  No other new complaints.      Past Medical History:   Past Medical History   Diagnosis Date    Chemotherapy follow-up examination 2016    HTN (hypertension)     Pancreatic mass     Paronychia 2017    Sickle cell trait     SOB (shortness of breath) 2017    Swelling of lower extremity 2017       Past Surgical HIstory:   Past Surgical History   Procedure Laterality Date     section      Tubal ligation      Breast surgery       breast reduction       Family History:   Family History   Problem Relation Age of Onset    Diabetes Mother     Stroke Father     Hypertension Father     Heart disease Father     Hypertension Brother     Diabetes Brother     Hypertension Brother        Social History:  reports that she has never smoked. She has never used smokeless tobacco. She reports that she drinks alcohol. She reports that she does not use illicit drugs.    Allergies:  Review of  patient's allergies indicates:  No Known Allergies    Medications:  Current Outpatient Prescriptions   Medication Sig Dispense Refill    gabapentin (NEURONTIN) 300 MG capsule Take 1 capsule (300 mg total) by mouth 2 (two) times daily. 60 capsule 5    GINGER ROOT (GINGER EXTRACT ORAL) Take by mouth.      HYDROmorphone (DILAUDID) 4 MG tablet Take 1 tablet (4 mg total) by mouth every 6 (six) hours as needed for Pain. 90 tablet 0    morphine (MS CONTIN) 100 MG 12 hr tablet Take 1 tablet (100 mg total) by mouth 2 (two) times daily. 60 tablet 0    multivitamin capsule Take 1 capsule by mouth once daily.      ondansetron (ZOFRAN) 8 MG tablet Take 1 tablet (8 mg total) by mouth every 8 (eight) hours as needed for Nausea. 60 tablet 2    polyethylene glycol (GLYCOLAX) 17 gram PwPk Take by mouth.      promethazine (PHENERGAN) 25 MG tablet Take 1 tablet (25 mg total) by mouth every 6 (six) hours as needed for Nausea. 30 tablet 1    vitamin D3-folic acid 5,000-1 unit-mg Tab Take by mouth once daily.      senna (SENOKOT) 8.6 mg tablet Take 2 tablets by mouth.       No current facility-administered medications for this visit.        Review of Systems   Constitutional: Positive for appetite change. Negative for chills, fever and unexpected weight change.   HENT: Negative for congestion, hearing loss and nosebleeds.    Eyes: Negative for visual disturbance.   Respiratory: Positive for shortness of breath. Negative for cough.    Cardiovascular: Positive for leg swelling. Negative for chest pain and palpitations.   Gastrointestinal: Positive for abdominal distention and abdominal pain. Negative for blood in stool, constipation, diarrhea, nausea and vomiting.   Genitourinary: Negative for dysuria.   Musculoskeletal: Negative for back pain and gait problem.   Skin: Positive for wound. Negative for color change and rash.   Neurological: Positive for numbness. Negative for dizziness, weakness and headaches.   Hematological:  "Negative for adenopathy. Does not bruise/bleed easily.   Psychiatric/Behavioral: Negative for confusion.       ECOG Performance Status:   ECOG SCORE    1 - Restricted in strenuous activity-ambulatory and able to carry out work of a light nature          Objective:      Vitals:   Vitals:    01/23/17 1459   BP: 135/84   Pulse: 103   Resp: 20   Temp: 98.9 °F (37.2 °C)   TempSrc: Oral   SpO2: 98%   Weight: 73.8 kg (162 lb 11.2 oz)   Height: 5' 4" (1.626 m)     BMI: Body mass index is 27.93 kg/(m^2).    Physical Exam   Constitutional: She is oriented to person, place, and time. She appears well-developed and well-nourished. No distress.   HENT:   Head: Normocephalic.   Mouth/Throat: No oropharyngeal exudate.   Eyes: EOM are normal. No scleral icterus.   Neck: Neck supple. No tracheal deviation present. No thyromegaly present.   Cardiovascular: Normal rate and regular rhythm.    Pulmonary/Chest: Effort normal and breath sounds normal. No respiratory distress. She has no wheezes. She has no rales.   Abdominal: Soft. She exhibits distension. She exhibits no mass. There is tenderness. There is no rebound and no guarding.   Musculoskeletal: Normal range of motion. She exhibits edema.   Bilateral lower extremity edema right greater than left   Lymphadenopathy:     She has no cervical adenopathy.   Neurological: She is alert and oriented to person, place, and time. No cranial nerve deficit.   Skin: Skin is warm and dry.   Psychiatric: She has a normal mood and affect.       Laboratory Data:  No visits with results within 1 Week(s) from this visit.  Latest known visit with results is:    Lab Visit on 01/11/2017   Component Date Value Ref Range Status    Sodium 01/11/2017 137  136 - 145 mmol/L Final    Potassium 01/11/2017 4.2  3.5 - 5.1 mmol/L Final    Chloride 01/11/2017 100  95 - 110 mmol/L Final    CO2 01/11/2017 31* 23 - 29 mmol/L Final    Glucose 01/11/2017 119* 70 - 110 mg/dL Final    BUN, Bld 01/11/2017 14  6 - 20 " mg/dL Final    Creatinine 01/11/2017 0.7  0.5 - 1.4 mg/dL Final    Calcium 01/11/2017 9.6  8.7 - 10.5 mg/dL Final    Total Protein 01/11/2017 7.1  6.0 - 8.4 g/dL Final    Albumin 01/11/2017 3.3* 3.5 - 5.2 g/dL Final    Total Bilirubin 01/11/2017 0.5  0.1 - 1.0 mg/dL Final    Comment: For infants and newborns, interpretation of results should be based  on gestational age, weight and in agreement with clinical  observations.  Premature Infant recommended reference ranges:  Up to 24 hours.............<8.0 mg/dL  Up to 48 hours............<12.0 mg/dL  3-5 days..................<15.0 mg/dL  6-29 days.................<15.0 mg/dL      Alkaline Phosphatase 01/11/2017 67  55 - 135 U/L Final    AST 01/11/2017 21  10 - 40 U/L Final    ALT 01/11/2017 17  10 - 44 U/L Final    Anion Gap 01/11/2017 6* 8 - 16 mmol/L Final    eGFR if African American 01/11/2017 >60.0  >60 mL/min/1.73 m^2 Final    eGFR if non African American 01/11/2017 >60.0  >60 mL/min/1.73 m^2 Final    Comment: Calculation used to obtain the estimated glomerular filtration  rate (eGFR) is the CKD-EPI equation. Since race is unknown   in our information system, the eGFR values for   -American and Non--American patients are given   for each creatinine result.      WBC 01/11/2017 4.79  3.90 - 12.70 K/uL Final    RBC 01/11/2017 3.70* 4.00 - 5.40 M/uL Final    Hemoglobin 01/11/2017 10.7* 12.0 - 16.0 g/dL Final    Hematocrit 01/11/2017 31.9* 37.0 - 48.5 % Final    MCV 01/11/2017 86  82 - 98 fL Final    MCH 01/11/2017 28.9  27.0 - 31.0 pg Final    MCHC 01/11/2017 33.5  32.0 - 36.0 % Final    RDW 01/11/2017 16.8* 11.5 - 14.5 % Final    Platelets 01/11/2017 146* 150 - 350 K/uL Final    MPV 01/11/2017 9.2  9.2 - 12.9 fL Final    Gran # 01/11/2017 2.7  1.8 - 7.7 K/uL Final    Comment: The ANC is based on a white cell differential from an   automated cell counter. It has not been microscopically   reviewed for the presence of abnormal  cells. Clinical   correlation is required.           Imaging:   CT 1/17/16  CT OF THE CHEST, ABDOMEN & PELVIS WITH IV CONTRAST:       Technique: CT examination of the chest abdomen and pelvis was obtained using 5 mm axial images after the administration of 100 cc of Omnipaque 350 IV and PO Contrast.  Noncontrast, arterial, portal venous, and delayed phase images were obtained.  Coronal and sagittal reformats were obtained.    Comparison: CT chest abdomen pelvis from 10/25/2016     Clinical Indication:  Pancreatic mass    Results:    Chest:  Stable left-sided chest port with its tip in the innominate vein, unchanged.  Normal thyroid.  Thoracic aorta is normal in caliber and contour.  Trace pericardial effusion.  No cardiomegaly.  No evidence of mediastinal, hilar, or axillary lymphadenopathy.    The trachea and bronchi are patent.  The lungs demonstrate innumerable pulmonary nodules.  These nodules are stable in size and number, however multiple nodules appear more solid when compared to prior exam.  Nodule in the right middle lobe measures 7 mm, unchanged in size, however this nodule appears more dense when compared to prior exam (series 3, image 89).  Interval increase in right small pleural effusion.  No left-sided pleural effusion.    Abdomen/pelvis:  Liver is normal in size and contour.  No focal hepatic lesions.  Gallbladder is normal in appearance.  Portal vein is patent.    Relatively stable hypodense mass in the tail of the pancreas measuring 5.5 cm, previously 6.3 cm (series 3, image 115).  Slight difference in size is likely secondary to slight differences in technique.  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  This mass abuts the left kidney, unchanged when compared to prior exam.  Left adrenal gland is not visualized and is likely involved. Collateral vessels are noted in the left upper quadrant.    Remaining portion of the pancreas is normal in appearance.  Spleen is normal in appearance.   Right adrenal gland is normal in appearance.  Right kidney is normal in appearance.  Urinary bladder is normal.  Uterus is normal in appearance.  Partially cystic structure measuring 4.2 x 3.1 cm in the right adnexal region.  This may represent a right complex adnexal cyst.  Left adnexa is normal in appearance.    The small bowel is normal in caliber.  No evidence of obstruction.  Moderate amount of stool throughout the colon.    No evidence of lymphadenopathy.    Osseous structures: Sclerotic lesion of T11 appears slightly larger in size when compared to prior examination.  Stable sclerosis of the L5 vertebral body and T9 vertebral body.  Stable sclerotic lesion of the right iliac bone.  No new sclerotic lesions.   Impression        1.  Stable hypodense mass in the pancreatic tail which occludes the splenic vein and may involve the left kidney.  Findings are not significantly changed when compared to prior exam.    2.  Innumerable pulmonary nodules which are stable in size and number, however some nodules appear more dense when compared to prior exam.    3.  Multiple stable sclerotic foci scattered throughout the osseous structures consistent with metastatic disease.  The sclerotic area in the T11 vertebral body is slightly more prominent when compared to prior exam.    4.  4.2 x 3.1 CM cystic lesion in the right adnexa which may represent a mildly complicated cyst.  Further evaluation with pelvic ultrasound is recommended.    5.  Slight increase in size of small right pleural effusion.              Assessment:       1. Malignant neoplasm of tail of pancreas    2. Chemotherapy follow-up examination    3. Swelling of lower extremity    4. Epigastric pain    5. SOB (shortness of breath)    6. Paronychia, unspecified laterality           Plan:      Mrs. Johnson has stable disease based on her latest CT, however, she is becoming more symptomatic with increasing abdominal pain and bloating.  She does have an appointment  with GI coming up.  Prior attempt at celiac plexus block exacerbated pain.  I'm most concerned about her lower extremity swelling and shortness of breath and concern she may have a DVT or PE.  I will send her for a stat ultrasound of the lower extremities and also a stat CT of the chest now.  We will plan to resume chemotherapy next week after antibiotic treatment.  I will also referred to dermatology.  I do not have any clinical trials currently available, however, her  is going to send me a list of clinical trials that he and his wife may be interested in and other institutions.      Sebas Zaman DO, FACP  Hematology & Oncology  [address] [place] [address]  ph. [phone]  Fax. [phone]    40 minutes were spent in coordination of patient's care, record review and counseling.  More than 50% of the time was face-to-face.

## 2017-01-23 NOTE — Clinical Note
Stat lower extremity ultrasound and CT today Refer to dermatology Resume chemotherapy in one week Follow-up with me in 2 weeks

## 2017-01-24 ENCOUNTER — PATIENT MESSAGE (OUTPATIENT)
Dept: HEMATOLOGY/ONCOLOGY | Facility: CLINIC | Age: 52
End: 2017-01-24

## 2017-01-24 ENCOUNTER — TELEPHONE (OUTPATIENT)
Dept: HEMATOLOGY/ONCOLOGY | Facility: CLINIC | Age: 52
End: 2017-01-24

## 2017-01-25 ENCOUNTER — OFFICE VISIT (OUTPATIENT)
Dept: NEUROLOGY | Facility: HOSPITAL | Age: 52
DRG: 436 | End: 2017-01-25
Attending: INTERNAL MEDICINE
Payer: COMMERCIAL

## 2017-01-25 VITALS
DIASTOLIC BLOOD PRESSURE: 101 MMHG | TEMPERATURE: 98 F | BODY MASS INDEX: 27.49 KG/M2 | WEIGHT: 161 LBS | HEART RATE: 99 BPM | SYSTOLIC BLOOD PRESSURE: 150 MMHG | HEIGHT: 64 IN

## 2017-01-25 DIAGNOSIS — R10.9 ABDOMINAL PAIN, ACUTE: Primary | ICD-10-CM

## 2017-01-25 RX ORDER — POLYETHYLENE GLYCOL 3350 17 G/17G
17 POWDER, FOR SOLUTION ORAL 2 TIMES DAILY
Qty: 100 EACH | Refills: 3 | Status: SHIPPED | OUTPATIENT
Start: 2017-01-25

## 2017-01-25 RX ORDER — BISACODYL 5 MG
TABLET, DELAYED RELEASE (ENTERIC COATED) ORAL
Qty: 40 TABLET | Refills: 3 | COMMUNITY
Start: 2017-01-25 | End: 2017-02-03

## 2017-01-25 RX ORDER — AMLODIPINE BESYLATE 2.5 MG/1
2.5 TABLET ORAL DAILY
COMMUNITY
End: 2017-02-03 | Stop reason: SDUPTHER

## 2017-01-25 NOTE — PROGRESS NOTES
[facility]    CC: abdominal pain    HPI 52 y.o. female with HTN, SC trait, and metastatic pancreatic cancer on chemotherapy who is here for chronic, worsening, moderate abdominal pain associated with constipation.  She has significant cancer related pain and takes morphine and dilaudid for this.  Over the past month or two, she has become constipated with frequent missed BMs and abdominal fullness/bloating, and loss of appetite.  She denies diarrhea and hard stools, as well as blood in stool.  She started Miralax daily 5 days ago which has helped some.    Previous records reviewed.    Recent CTA chest reviewed      Past Medical History   Diagnosis Date    Chemotherapy follow-up examination 2016    HTN (hypertension)     Pancreatic mass     Paronychia 2017    Sickle cell trait     SOB (shortness of breath) 2017    Swelling of lower extremity 2017         Past Surgical History   Procedure Laterality Date     section      Tubal ligation      Breast surgery       breast reduction       Social History: no FANG abuse   Family History: negative for IBD or CRC       Review of Systems  General ROS: negative for chills; positive weight loss   Psychological ROS: negative for hallucination, depression or suicidal ideation  Ophthalmic ROS: negative for blurry vision, photophobia or eye pain  ENT ROS: negative for epistaxis, sore throat or rhinorrhea  Respiratory ROS: no cough, shortness of breath, or wheezing  Cardiovascular ROS: no chest pain, +dyspnea on exertion  Gastrointestinal ROS: +abdominal pain, +change in bowel habits, no black/ bloody stools  Genito-Urinary ROS: no dysuria, trouble voiding, or hematuria  Musculoskeletal ROS: negative for gait disturbance or muscular weakness  Neurological ROS: no syncope or seizures; no ataxia  Dermatological ROS: negative for pruritis, rash and jaundice    Physical Examination  Visit Vitals    BP (!) 150/101    Pulse 99    Temp  "98.4 °F (36.9 °C) (Oral)    Ht 5' 4" (1.626 m)    Wt 73 kg (161 lb)    BMI 27.64 kg/m2     General appearance: alert, cooperative, no distress  HENT: Normocephalic, atraumatic, neck symmetrical, no nasal discharge   Eyes: conjunctivae/corneas clear, PERRL, EOM's intact  Lungs: clear to auscultation bilaterally, no dullness to percussion bilaterally  Heart: regular rate and rhythm without rub; no displacement of the PMI   Abdomen: soft, full, mildly tender to palpation in right mid abdomen; bowel sounds decreased; no organomegaly  Extremities: extremities symmetric; no clubbing, cyanosis, or edema  Integument: Skin color, texture, turgor normal; no rashes; hair distrubution normal  Neurologic: Alert and oriented X 3, normal strength, normal coordination and gait  Psychiatric: no pressured speech; normal affect; no evidence of impaired cognition     Labs:    From 1/11/17:    H/H 10.7/31.9  MCV 86  Platelets 146    Albumin 3.3    Imaging:    CTA Chest 1/23/17:    No evidence of pulmonary thromboembolism or pulmonary infarction.    2.  Moderate right and small left pleural effusions with adjacent compressive atelectasis of the bilateral lower lobes.    3. Large volume of stool within the visualized colon.    4. Interval increase in size of pulmonary and osseous metastatic disease in this patient with a history of pancreatic cancer.      I have personally reviewed these images.    Assessment:   53 yo AAF with SC trait, HTN, and metastatic pancreatic cancer on chemotherapy requiring narcotic pain medication, with worsening abdominal pain with abdominal distension, constipation, and a large amount of stool in the colon seen on recent imaging.  Her presentation is consistent with opioid induced constipation, which will continued to be an issue for her as her tolerance for pain medication goes up.    Plan:   - Increase Miralax to BID and take with water.  This medication can be adjusted as needed to ensure a complete, " daily BM.  - Take 2-4 Dulcolax tabs every other day plus add mg citrate for rescue   - If she has severe constipation or concern for constipation in the future, then Relistor should be given (either at home or in the ER)    Elvis Stein MD   [address], [address]   [place] [address] [phone]

## 2017-01-25 NOTE — MR AVS SNAPSHOT
[facility]  [address] [place] [address]  Phone: [phone]  Fax: [phone]                  Camilo Johnson   2017 8:30 AM   Office Visit    Description:  Female : 1965   Provider:  Elvis Stein MD   Department:  [facility]           Reason for Visit     Consult                To Do List           Goals (5 Years of Data)     None      [facility]     [facility] Care Line -  Assistance  Registered nurses in the [facility] provide clinical advisement, health education, appointment booking, and other advisory services.  Call for this free service at [phone].             Medications           Message regarding Medications     Verify the changes and/or additions to your medication regime listed below are the same as discussed with your clinician today.  If any of these changes or additions are incorrect, please notify your healthcare provider.        STOP taking these medications     senna (SENOKOT) 8.6 mg tablet Take 2 tablets by mouth.           Verify that the below list of medications is an accurate representation of the medications you are currently taking.  If none reported, the list may be blank. If incorrect, please contact your healthcare provider. Carry this list with you in case of emergency.           Current Medications     amlodipine (NORVASC) 2.5 MG tablet Take 2.5 mg by mouth once daily.    clindamycin (CLEOCIN) 300 MG capsule Take 1 capsule (300 mg total) by mouth 3 (three) times daily.    gabapentin (NEURONTIN) 300 MG capsule Take 1 capsule (300 mg total) by mouth 2 (two) times daily.    GINGER ROOT (GINGER EXTRACT ORAL) Take by mouth.    HYDROmorphone (DILAUDID) 4 MG tablet Take 1 tablet (4 mg total) by mouth every 6 (six) hours as needed for Pain.    morphine (MS CONTIN) 100 MG 12 hr tablet Take 1 tablet (100 mg total) by mouth 2 (two) times daily.    multivitamin capsule Take 1 capsule by  "mouth once daily.    ondansetron (ZOFRAN) 8 MG tablet Take 1 tablet (8 mg total) by mouth every 8 (eight) hours as needed for Nausea.    polyethylene glycol (GLYCOLAX) 17 gram PwPk Take by mouth.    promethazine (PHENERGAN) 25 MG tablet Take 1 tablet (25 mg total) by mouth every 6 (six) hours as needed for Nausea.    TURMERIC ROOT EXTRACT ORAL Take by mouth.    vitamin D3-folic acid 5,000-1 unit-mg Tab Take by mouth once daily.           Clinical Reference Information           Vital Signs - Last Recorded  Most recent update: 1/25/2017  8:36 AM by Adore Tamez MA    BP Pulse Temp Ht Wt BMI    (!) 150/101 99 98.4 °F (36.9 °C) (Oral) 5' 4" (1.626 m) 73 kg (161 lb) 27.64 kg/m2      Blood Pressure          Most Recent Value    BP  (!)  150/101      Allergies as of 1/25/2017     No Known Allergies      Immunizations Administered on Date of Encounter - 1/25/2017     None      Instructions    Please call this office with any questions or concerns.        "

## 2017-01-26 ENCOUNTER — HOSPITAL ENCOUNTER (INPATIENT)
Facility: HOSPITAL | Age: 52
LOS: 5 days | Discharge: HOME | DRG: 436 | End: 2017-01-31
Attending: EMERGENCY MEDICINE | Admitting: SURGERY
Payer: COMMERCIAL

## 2017-01-26 ENCOUNTER — PATIENT MESSAGE (OUTPATIENT)
Dept: NEUROLOGY | Facility: HOSPITAL | Age: 52
End: 2017-01-26

## 2017-01-26 ENCOUNTER — TELEPHONE (OUTPATIENT)
Dept: NEUROLOGY | Facility: HOSPITAL | Age: 52
End: 2017-01-26

## 2017-01-26 DIAGNOSIS — K56.699 OTHER SPECIFIED INTESTINAL OBSTRUCTION: ICD-10-CM

## 2017-01-26 DIAGNOSIS — K56.609 BOWEL OBSTRUCTION: ICD-10-CM

## 2017-01-26 DIAGNOSIS — C78.7 PANCREATIC CANCER METASTASIZED TO LIVER: ICD-10-CM

## 2017-01-26 DIAGNOSIS — C25.9 MALIGNANT NEOPLASM OF PANCREAS, UNSPECIFIED LOCATION OF MALIGNANCY: ICD-10-CM

## 2017-01-26 DIAGNOSIS — E86.0 DEHYDRATION: ICD-10-CM

## 2017-01-26 DIAGNOSIS — K56.60 INTESTINAL OBSTRUCTION, UNSPECIFIED TYPE: ICD-10-CM

## 2017-01-26 DIAGNOSIS — R10.84 GENERALIZED ABDOMINAL PAIN: ICD-10-CM

## 2017-01-26 DIAGNOSIS — E46 MALNUTRITION COMPROMISING BODILY FUNCTION: ICD-10-CM

## 2017-01-26 DIAGNOSIS — K56.609 LARGE BOWEL OBSTRUCTION: ICD-10-CM

## 2017-01-26 DIAGNOSIS — K56.7 ILEUS: Primary | ICD-10-CM

## 2017-01-26 DIAGNOSIS — C25.9 PANCREATIC CANCER METASTASIZED TO LIVER: ICD-10-CM

## 2017-01-26 LAB
ALBUMIN SERPL BCP-MCNC: 4.2 G/DL
ALP SERPL-CCNC: 91 U/L
ALT SERPL W/O P-5'-P-CCNC: 25 U/L
ANION GAP SERPL CALC-SCNC: 12 MMOL/L
AST SERPL-CCNC: 34 U/L
BACTERIA #/AREA URNS HPF: NORMAL /HPF
BASOPHILS # BLD AUTO: 0.03 K/UL
BASOPHILS NFR BLD: 0.4 %
BILIRUB SERPL-MCNC: 1.2 MG/DL
BILIRUB UR QL STRIP: NEGATIVE
BUN SERPL-MCNC: 9 MG/DL
CALCIUM SERPL-MCNC: 10.6 MG/DL
CHLORIDE SERPL-SCNC: 95 MMOL/L
CLARITY UR: CLEAR
CO2 SERPL-SCNC: 31 MMOL/L
COLOR UR: YELLOW
CREAT SERPL-MCNC: 0.8 MG/DL
DIFFERENTIAL METHOD: ABNORMAL
EOSINOPHIL # BLD AUTO: 0 K/UL
EOSINOPHIL NFR BLD: 0.1 %
ERYTHROCYTE [DISTWIDTH] IN BLOOD BY AUTOMATED COUNT: 17.7 %
EST. GFR  (AFRICAN AMERICAN): >60 ML/MIN/1.73 M^2
EST. GFR  (NON AFRICAN AMERICAN): >60 ML/MIN/1.73 M^2
GLUCOSE SERPL-MCNC: 124 MG/DL
GLUCOSE UR QL STRIP: NEGATIVE
HCT VFR BLD AUTO: 38.5 %
HGB BLD-MCNC: 12.9 G/DL
HGB UR QL STRIP: NEGATIVE
HYALINE CASTS #/AREA URNS LPF: 0 /LPF
INR PPP: 1.1
KETONES UR QL STRIP: ABNORMAL
LEUKOCYTE ESTERASE UR QL STRIP: NEGATIVE
LIPASE SERPL-CCNC: 10 U/L
LYMPHOCYTES # BLD AUTO: 0.9 K/UL
LYMPHOCYTES NFR BLD: 13.5 %
MCH RBC QN AUTO: 29.1 PG
MCHC RBC AUTO-ENTMCNC: 33.5 %
MCV RBC AUTO: 87 FL
MICROSCOPIC COMMENT: NORMAL
MONOCYTES # BLD AUTO: 0.8 K/UL
MONOCYTES NFR BLD: 11.2 %
NEUTROPHILS # BLD AUTO: 5.1 K/UL
NEUTROPHILS NFR BLD: 74.5 %
NITRITE UR QL STRIP: NEGATIVE
OB PNL STL: NEGATIVE
PH UR STRIP: 8 [PH] (ref 5–8)
PLATELET # BLD AUTO: 458 K/UL
PMV BLD AUTO: 9.5 FL
POTASSIUM SERPL-SCNC: 4.7 MMOL/L
PROT SERPL-MCNC: 7.9 G/DL
PROT UR QL STRIP: ABNORMAL
PROTHROMBIN TIME: 12 SEC
RBC # BLD AUTO: 4.44 M/UL
RBC #/AREA URNS HPF: 0 /HPF (ref 0–4)
SODIUM SERPL-SCNC: 138 MMOL/L
SP GR UR STRIP: 1.01 (ref 1–1.03)
URN SPEC COLLECT METH UR: ABNORMAL
UROBILINOGEN UR STRIP-ACNC: 1 EU/DL
WBC # BLD AUTO: 6.79 K/UL
WBC #/AREA URNS HPF: 1 /HPF (ref 0–5)

## 2017-01-26 PROCEDURE — 63600175 PHARM REV CODE 636 W HCPCS: Performed by: STUDENT IN AN ORGANIZED HEALTH CARE EDUCATION/TRAINING PROGRAM

## 2017-01-26 PROCEDURE — 83690 ASSAY OF LIPASE: CPT

## 2017-01-26 PROCEDURE — 25500020 PHARM REV CODE 255: Performed by: EMERGENCY MEDICINE

## 2017-01-26 PROCEDURE — 25000003 PHARM REV CODE 250: Performed by: STUDENT IN AN ORGANIZED HEALTH CARE EDUCATION/TRAINING PROGRAM

## 2017-01-26 PROCEDURE — 25000003 PHARM REV CODE 250: Performed by: EMERGENCY MEDICINE

## 2017-01-26 PROCEDURE — 96372 THER/PROPH/DIAG INJ SC/IM: CPT

## 2017-01-26 PROCEDURE — 63600175 PHARM REV CODE 636 W HCPCS: Performed by: EMERGENCY MEDICINE

## 2017-01-26 PROCEDURE — 96375 TX/PRO/DX INJ NEW DRUG ADDON: CPT

## 2017-01-26 PROCEDURE — 96361 HYDRATE IV INFUSION ADD-ON: CPT

## 2017-01-26 PROCEDURE — 96376 TX/PRO/DX INJ SAME DRUG ADON: CPT

## 2017-01-26 PROCEDURE — 85610 PROTHROMBIN TIME: CPT

## 2017-01-26 PROCEDURE — 99285 EMERGENCY DEPT VISIT HI MDM: CPT | Mod: 25

## 2017-01-26 PROCEDURE — 82272 OCCULT BLD FECES 1-3 TESTS: CPT

## 2017-01-26 PROCEDURE — 11000001 HC ACUTE MED/SURG PRIVATE ROOM

## 2017-01-26 PROCEDURE — 85025 COMPLETE CBC W/AUTO DIFF WBC: CPT

## 2017-01-26 PROCEDURE — 96365 THER/PROPH/DIAG IV INF INIT: CPT

## 2017-01-26 PROCEDURE — 80053 COMPREHEN METABOLIC PANEL: CPT

## 2017-01-26 PROCEDURE — 81000 URINALYSIS NONAUTO W/SCOPE: CPT

## 2017-01-26 RX ORDER — ONDANSETRON 8 MG/1
8 TABLET, ORALLY DISINTEGRATING ORAL EVERY 8 HOURS PRN
Status: DISCONTINUED | OUTPATIENT
Start: 2017-01-26 | End: 2017-01-31 | Stop reason: HOSPADM

## 2017-01-26 RX ORDER — MIDAZOLAM HYDROCHLORIDE 5 MG/ML
1 INJECTION INTRAMUSCULAR; INTRAVENOUS
Status: DISCONTINUED | OUTPATIENT
Start: 2017-01-26 | End: 2017-01-26

## 2017-01-26 RX ORDER — HYDROMORPHONE HYDROCHLORIDE 1 MG/ML
1 INJECTION, SOLUTION INTRAMUSCULAR; INTRAVENOUS; SUBCUTANEOUS
Status: COMPLETED | OUTPATIENT
Start: 2017-01-26 | End: 2017-01-26

## 2017-01-26 RX ORDER — KETOROLAC TROMETHAMINE 30 MG/ML
30 INJECTION, SOLUTION INTRAMUSCULAR; INTRAVENOUS EVERY 6 HOURS PRN
Status: DISPENSED | OUTPATIENT
Start: 2017-01-26 | End: 2017-01-29

## 2017-01-26 RX ORDER — ACETAMINOPHEN 325 MG/1
650 TABLET ORAL EVERY 8 HOURS PRN
Status: DISCONTINUED | OUTPATIENT
Start: 2017-01-26 | End: 2017-01-26

## 2017-01-26 RX ORDER — ACETAMINOPHEN 10 MG/ML
1000 INJECTION, SOLUTION INTRAVENOUS EVERY 8 HOURS
Status: COMPLETED | OUTPATIENT
Start: 2017-01-26 | End: 2017-01-27

## 2017-01-26 RX ORDER — LIDOCAINE HYDROCHLORIDE 20 MG/ML
5 SOLUTION OROPHARYNGEAL
Status: COMPLETED | OUTPATIENT
Start: 2017-01-26 | End: 2017-01-26

## 2017-01-26 RX ORDER — SODIUM CHLORIDE 9 MG/ML
1000 INJECTION, SOLUTION INTRAVENOUS
Status: COMPLETED | OUTPATIENT
Start: 2017-01-26 | End: 2017-01-26

## 2017-01-26 RX ORDER — ONDANSETRON 2 MG/ML
4 INJECTION INTRAMUSCULAR; INTRAVENOUS
Status: COMPLETED | OUTPATIENT
Start: 2017-01-26 | End: 2017-01-26

## 2017-01-26 RX ORDER — MIDAZOLAM HYDROCHLORIDE 1 MG/ML
1 INJECTION, SOLUTION INTRAMUSCULAR; INTRAVENOUS ONCE
Status: DISCONTINUED | OUTPATIENT
Start: 2017-01-26 | End: 2017-01-26

## 2017-01-26 RX ORDER — SODIUM CHLORIDE 9 MG/ML
INJECTION, SOLUTION INTRAVENOUS CONTINUOUS
Status: DISCONTINUED | OUTPATIENT
Start: 2017-01-26 | End: 2017-01-28

## 2017-01-26 RX ORDER — MIDAZOLAM HYDROCHLORIDE 1 MG/ML
1 INJECTION, SOLUTION INTRAMUSCULAR; INTRAVENOUS ONCE
Status: COMPLETED | OUTPATIENT
Start: 2017-01-26 | End: 2017-01-26

## 2017-01-26 RX ORDER — ENOXAPARIN SODIUM 100 MG/ML
40 INJECTION SUBCUTANEOUS EVERY 24 HOURS
Status: DISCONTINUED | OUTPATIENT
Start: 2017-01-26 | End: 2017-01-31 | Stop reason: HOSPADM

## 2017-01-26 RX ORDER — DIPHENHYDRAMINE HYDROCHLORIDE 50 MG/ML
25 INJECTION INTRAMUSCULAR; INTRAVENOUS EVERY 4 HOURS PRN
Status: DISCONTINUED | OUTPATIENT
Start: 2017-01-26 | End: 2017-01-31 | Stop reason: HOSPADM

## 2017-01-26 RX ORDER — HYDROMORPHONE HYDROCHLORIDE 1 MG/ML
0.5 INJECTION, SOLUTION INTRAMUSCULAR; INTRAVENOUS; SUBCUTANEOUS EVERY 6 HOURS PRN
Status: DISCONTINUED | OUTPATIENT
Start: 2017-01-26 | End: 2017-01-31 | Stop reason: HOSPADM

## 2017-01-26 RX ORDER — RAMELTEON 8 MG/1
8 TABLET ORAL NIGHTLY PRN
Status: DISCONTINUED | OUTPATIENT
Start: 2017-01-26 | End: 2017-01-31 | Stop reason: HOSPADM

## 2017-01-26 RX ADMIN — HYDROMORPHONE HYDROCHLORIDE 1 MG: 1 INJECTION, SOLUTION INTRAMUSCULAR; INTRAVENOUS; SUBCUTANEOUS at 11:01

## 2017-01-26 RX ADMIN — TOPICAL ANESTHETIC: 200 SPRAY DENTAL; PERIODONTAL at 03:01

## 2017-01-26 RX ADMIN — KETOROLAC TROMETHAMINE 30 MG: 30 INJECTION, SOLUTION INTRAMUSCULAR at 05:01

## 2017-01-26 RX ADMIN — HYDROMORPHONE HYDROCHLORIDE 0.5 MG: 1 INJECTION, SOLUTION INTRAMUSCULAR; INTRAVENOUS; SUBCUTANEOUS at 08:01

## 2017-01-26 RX ADMIN — IOHEXOL 30 ML: 350 INJECTION, SOLUTION INTRAVENOUS at 11:01

## 2017-01-26 RX ADMIN — SODIUM CHLORIDE 1000 ML: 0.9 INJECTION, SOLUTION INTRAVENOUS at 12:01

## 2017-01-26 RX ADMIN — KETOROLAC TROMETHAMINE 30 MG: 30 INJECTION, SOLUTION INTRAMUSCULAR at 11:01

## 2017-01-26 RX ADMIN — ONDANSETRON 4 MG: 2 INJECTION INTRAMUSCULAR; INTRAVENOUS at 12:01

## 2017-01-26 RX ADMIN — METHYLNALTREXONE BROMIDE 12 MG: 12 INJECTION, SOLUTION SUBCUTANEOUS at 05:01

## 2017-01-26 RX ADMIN — MIDAZOLAM HYDROCHLORIDE 1 MG: 1 INJECTION, SOLUTION INTRAMUSCULAR; INTRAVENOUS at 02:01

## 2017-01-26 RX ADMIN — LIDOCAINE HYDROCHLORIDE 5 ML: 20 SOLUTION ORAL; TOPICAL at 03:01

## 2017-01-26 RX ADMIN — ENOXAPARIN SODIUM 40 MG: 100 INJECTION SUBCUTANEOUS at 05:01

## 2017-01-26 RX ADMIN — SODIUM CHLORIDE 1000 ML: 0.9 INJECTION, SOLUTION INTRAVENOUS at 11:01

## 2017-01-26 RX ADMIN — ACETAMINOPHEN 1000 MG: 10 INJECTION, SOLUTION INTRAVENOUS at 10:01

## 2017-01-26 RX ADMIN — IOHEXOL 75 ML: 350 INJECTION, SOLUTION INTRAVENOUS at 03:01

## 2017-01-26 RX ADMIN — HYDROMORPHONE HYDROCHLORIDE 1 MG: 1 INJECTION, SOLUTION INTRAMUSCULAR; INTRAVENOUS; SUBCUTANEOUS at 03:01

## 2017-01-26 RX ADMIN — ONDANSETRON 4 MG: 2 INJECTION INTRAMUSCULAR; INTRAVENOUS at 11:01

## 2017-01-26 RX ADMIN — SODIUM CHLORIDE: 0.9 INJECTION, SOLUTION INTRAVENOUS at 06:01

## 2017-01-26 RX ADMIN — PROMETHAZINE HYDROCHLORIDE 12.5 MG: 25 INJECTION INTRAMUSCULAR; INTRAVENOUS at 12:01

## 2017-01-26 NOTE — IP AVS SNAPSHOT
[facility]  [address]  [place] [address]  Phone: [phone]           Patient Discharge Instructions     Our goal is to set you up for success. This packet includes information on your condition, medications, and your home care. It will help you to care for yourself so you don't get sicker and need to go back to the hospital.     Please ask your nurse if you have any questions.        There are many details to remember when preparing to leave the hospital. Here is what you will need to do:    1. Take your medicine. If you are prescribed medications, review your Medication List in the following pages. You may have new medications to  at the pharmacy and others that you'll need to stop taking. Review the instructions for how and when to take your medications. Talk with your doctor or nurses if you are unsure of what to do.     2. Go to your follow-up appointments. Specific follow-up information is listed in the following pages. Your may be contacted by a transition nurse or clinical provider about future appointments. Be sure we have all of the phone numbers to reach you, if needed. Please contact your provider's office if you are unable to make an appointment.     3. Watch for warning signs. Your doctor or nurse will give you detailed warning signs to watch for and when to call for assistance. These instructions may also include educational information about your condition. If you experience any of warning signs to your health, call your doctor.               ** Verify the list of medication(s) below is accurate and up to date. Carry this with you in case of emergency. If your medications have changed, please notify your healthcare provider.             Medication List      CONTINUE taking these medications        Additional Info                      amlodipine 2.5 MG tablet   Commonly known as:  NORVASC   Refills:  0   Dose:  2.5 mg    Last time this was given:  2.5 mg on 1/31/2017   8:50 AM   Instructions:  Take 2.5 mg by mouth once daily.     Begin Date    AM    Noon    PM    Bedtime       bisacodyl 5 mg EC tablet   Commonly known as:  DULCOLAX   Quantity:  40 tablet   Refills:  3    Instructions:  Take 2-4 tablets every other day as needed for constipation     Begin Date    AM    Noon    PM    Bedtime       gabapentin 300 MG capsule   Commonly known as:  NEURONTIN   Quantity:  60 capsule   Refills:  5   Dose:  300 mg    Last time this was given:  300 mg on 1/31/2017  8:50 AM   Instructions:  Take 1 capsule (300 mg total) by mouth 2 (two) times daily.     Begin Date    AM    Noon    PM    Bedtime       GINGER EXTRACT ORAL   Refills:  0    Instructions:  Take by mouth.     Begin Date    AM    Noon    PM    Bedtime       HYDROmorphone 4 MG tablet   Commonly known as:  DILAUDID   Quantity:  90 tablet   Refills:  0   Dose:  4 mg    Last time this was given:  4 mg on 1/30/2017  9:27 AM   Instructions:  Take 1 tablet (4 mg total) by mouth every 6 (six) hours as needed for Pain.     Begin Date    AM    Noon    PM    Bedtime       morphine 100 MG 12 hr tablet   Commonly known as:  MS CONTIN   Quantity:  60 tablet   Refills:  0   Dose:  100 mg    Instructions:  Take 1 tablet (100 mg total) by mouth 2 (two) times daily.     Begin Date    AM    Noon    PM    Bedtime       multivitamin capsule   Refills:  0   Dose:  1 capsule    Instructions:  Take 1 capsule by mouth once daily.     Begin Date    AM    Noon    PM    Bedtime       ondansetron 8 MG tablet   Commonly known as:  ZOFRAN   Quantity:  60 tablet   Refills:  2   Dose:  8 mg    Instructions:  Take 1 tablet (8 mg total) by mouth every 8 (eight) hours as needed for Nausea.     Begin Date    AM    Noon    PM    Bedtime       polyethylene glycol 17 gram Pwpk   Commonly known as:  GLYCOLAX   Quantity:  100 each   Refills:  3   Dose:  17 g    Instructions:  Take 17 g by mouth 2 (two) times daily.     Begin Date    AM    Noon    PM    Bedtime        promethazine 25 MG tablet   Commonly known as:  PHENERGAN   Quantity:  30 tablet   Refills:  1   Dose:  25 mg    Instructions:  Take 1 tablet (25 mg total) by mouth every 6 (six) hours as needed for Nausea.     Begin Date    AM    Noon    PM    Bedtime       TURMERIC ROOT EXTRACT ORAL   Refills:  0    Instructions:  Take by mouth.     Begin Date    AM    Noon    PM    Bedtime       vitamin D3-folic acid 5,000 unit- 1 mg Tab   Refills:  0    Instructions:  Take by mouth once daily.     Begin Date    AM    Noon    PM    Bedtime         STOP taking these medications     clindamycin 300 MG capsule   Commonly known as:  CLEOCIN                  Please bring to all follow up appointments:    1. A copy of your discharge instructions.  2. All medicines you are currently taking in their original bottles.  3. Identification and insurance card.    Please arrive 15 minutes ahead of scheduled appointment time.    Please call 24 hours in advance if you must reschedule your appointment and/or time.        Your Scheduled Appointments     Feb 02, 2017  9:20 AM CST   Non-Fasting Lab with LAB, HEMONC CANCER BLDG   [facility] ([facility])    [address]  [place] [address]   [phone]            Feb 02, 2017 10:30 AM CST   Infusion 180 MIn with [facility], CHEMO   [facility] ([facility])    [facility] [facility] [address]   [phone]            Feb 03, 2017  9:30 AM CST   Injection with INJECTION, [facility] INFUSION   [facility] ([facility])    [address] [facility] [address]   [phone]            Feb 06, 2017  2:00 PM CST   Established Patient Visit with Sebas Zaman DO, FACP [facility] ([facility])    [address]  [place] [address]   [phone]            Feb 09, 2017 11:00 AM CST   Infusion 180 MIn with [facility] CHEMO   [facility]  ([facility])    [address]  [facility] [id number] [phone]              Follow-up Information     Follow up with Sebas Zaman DO, FACP On 2/6/2017.    Specialty:  Hematology and Oncology    Why:  2:00 pm  -- previously booked     Contact information:    [address]  [facility] [address]  [phone]          Follow up with Latoya Alarcon MD.    Specialty:  Family Medicine    Why:  Dr. Alarcon's nurse will call you with an appointment     Contact information:    [address] [facility]  [place] [address]  [phone]          Discharge Instructions     Future Orders    Activity as tolerated     Call MD for:  difficulty breathing or increased cough     Call MD for:  increased confusion or weakness     Call MD for:  persistent dizziness, light-headedness, or visual disturbances     Call MD for:  persistent nausea and vomiting or diarrhea     Call MD for:  redness, tenderness, or signs of infection (pain, swelling, redness, odor or green/yellow discharge around incision site)     Call MD for:  severe persistent headache     Call MD for:  severe uncontrolled pain     Call MD for:  temperature >100.4     Call MD for:  worsening rash     Diet general     Questions:    Total calories:      Fat restriction, if any:      Protein restriction, if any:      Na restriction, if any:      Fluid restriction:      Additional restrictions:      No dressing needed         Discharge Instructions       Following handouts given  BOWEL OBSTRUCTION, SMALL    Discharge References/Attachments     BOWEL OBSTRUCTION, SMALL (ENGLISH)        Primary Diagnosis     Your primary diagnosis was:  Large Bowel Obstruction      Admission Information     Date & Time Provider Department CSN    1/26/2017 10:02 AM Salma Schwab MD [facility] [id number]      Care Providers     Provider Role Specialty Primary office phone    Salma Schwab MD Attending Provider General Surgery  "[phone]    Srinivas Izaguirre MD Consulting Physician  Hematology and Oncology [phone]    Gertrude Espinoza MD Consulting Physician  Gastroenterology  [phone]    Elvis Stein MD Surgeon  Gastroenterology [phone]      Your Vitals Were     BP Pulse Temp Resp Height Weight    126/81 (BP Location: Right arm, Patient Position: Lying, BP Method: Automatic) 93 98.1 °F (36.7 °C) (Oral) 20 5' 4" (1.626 m) 72.1 kg (159 lb)    SpO2 BMI             97% 27.29 kg/m2         Recent Lab Values        12/30/2013 3/18/2016                        9:00 AM 11:03 AM          A1C 5.8 5.8                     Allergies as of 1/31/2017     No Known Allergies      [facility]     [facility] Nurse Care Line - 24/7 Assistance  Unless otherwise directed by your provider, please contact [facility], our nurse care line that is available for 24/7 assistance.     Registered nurses in the [facility] provide clinical advisement, health education, appointment booking, and other advisory services.  Call for this free service at [phone].        Advance Directives     An advance directive is a document which, in the event you are no longer able to make decisions for yourself, tells your healthcare team what kind of treatment you do or do not want to receive, or who you would like to make those decisions for you.  If you do not currently have an advance directive, [facility] encourages you to create one.  For more information call:  [phone]), [phone] ([phone]),  or log on to [email or web address].        Language Assistance Services     ATTENTION: Language assistance services are available, free of charge. Please call [phone].      ATENCIÓN: Si habla español, tiene a elizabeth disposición servicios gratuitos de asistencia lingüística. Llame al [phone].     CHÚ Ý: N?u b?n nói Ti?ng Vi?t, có các d?ch v? h? tr? ngôn ng? mi?n phí dành cho b?n. G?i s? " [phone].         [facility] complies with applicable Federal civil rights laws and does not discriminate on the basis of race, color, national origin, age, disability, or sex.

## 2017-01-26 NOTE — ED TRIAGE NOTES
53 Y/O F with CC of Constipation and abdominal pain. Pt reports has noticed a change in bowel habits from daily to every couple of days. Also a decrease in stool amt. Pt states over the past couple weeks has been having increased distension as well. States has had a decrease in flatus as well. Denies hx of obstruction. Reports taking mag citrate, dulcolax, and miralax with little relief. Describes abd pain as generalized intermittent cramping similar to contractions. No other complaints verbalized. Pt provided with gown and instructed to change into it and remove all metal. Pt verbalized understanding. NAD noted. Will continue to monitor.

## 2017-01-26 NOTE — TELEPHONE ENCOUNTER
----- Message from Meena Huber sent at 1/26/2017  8:23 AM CST -----  GI- Patient called needing to speak to nurse on which way to proceed (no bowel movement). Patients contact number [phone]

## 2017-01-26 NOTE — H&P
[facility]  History & Physical  General Surgery    SUBJECTIVE:     Chief Complaint/Reason for Admission: Abdominal pain    History of Present Illness:  Patient is a 52 y.o. female with Hx of pancreatic CA metastasis to lungs on chemo with mediport in place and HTN comes to ED with abdominal pain, nausea, and vomiting. Patient states she has had intermittent episodes of constipation over the past 1-2 months which have resolved with enemas. However, over the past week, she developed nausea and dark green brown emesis with accompanied generalized abdominal pain coming in waves. Last bm yesterday morning, described as small brown pebble. She states pain is much worse than her previous constipation episodes. In ED, patient had NGT placed due to frequent dark brown emesis in ED. She feels much improved. Denies sob, chest pain, fever, urinary symptoms, or any other complaints.        Review of patient's allergies indicates:  No Known Allergies    Past Medical History   Diagnosis Date    Chemotherapy follow-up examination 2016    HTN (hypertension)     Pancreatic mass     Paronychia 2017    Sickle cell trait     SOB (shortness of breath) 2017    Swelling of lower extremity 2017     Past Surgical History   Procedure Laterality Date     section      Tubal ligation      Breast surgery       breast reduction     Family History   Problem Relation Age of Onset    Diabetes Mother     Stroke Father     Hypertension Father     Heart disease Father     Hypertension Brother     Diabetes Brother     Hypertension Brother      Social History   Substance Use Topics    Smoking status: Never Smoker    Smokeless tobacco: Never Used    Alcohol use Yes      Comment: 1 glass once or twice per week        Review of Systems:  A 10 point ROS is negative except as mentioned above    OBJECTIVE:     Vital Signs (Most Recent):  Temp: 98.5 °F (36.9 °C) (17 1450)  Pulse: (!)  118 (01/26/17 1000)  Resp: 18 (01/26/17 1000)  BP: (!) 140/86 (01/26/17 1000)  SpO2: 97 % (01/26/17 1000)    Physical Exam:  Moderate distress 2/2 pain and nausea  Dry mucous membranes  NGT in place  Mediport in place  Non labored breathing  Tachycardic   Abdomen: distended, diffuse TTP more in LUQ and softer in LLQ, voluntary guarding, no peritoneal signs and no pain with percussion, +tympany, no rebound  MSKL: no peripheral edema    Laboratory:  Lab Results   Component Value Date    WBC 6.79 01/26/2017    HGB 12.9 01/26/2017    HCT 38.5 01/26/2017    MCV 87 01/26/2017     (H) 01/26/2017     CMP  Sodium   Date Value Ref Range Status   01/26/2017 138 136 - 145 mmol/L Final     Potassium   Date Value Ref Range Status   01/26/2017 4.7 3.5 - 5.1 mmol/L Final     Chloride   Date Value Ref Range Status   01/26/2017 95 95 - 110 mmol/L Final     CO2   Date Value Ref Range Status   01/26/2017 31 (H) 23 - 29 mmol/L Final     Glucose   Date Value Ref Range Status   01/26/2017 124 (H) 70 - 110 mg/dL Final     BUN, Bld   Date Value Ref Range Status   01/26/2017 9 6 - 20 mg/dL Final     Creatinine   Date Value Ref Range Status   01/26/2017 0.8 0.5 - 1.4 mg/dL Final     Calcium   Date Value Ref Range Status   01/26/2017 10.6 (H) 8.7 - 10.5 mg/dL Final     Total Protein   Date Value Ref Range Status   01/26/2017 7.9 6.0 - 8.4 g/dL Final     Albumin   Date Value Ref Range Status   01/26/2017 4.2 3.5 - 5.2 g/dL Final     Total Bilirubin   Date Value Ref Range Status   01/26/2017 1.2 (H) 0.1 - 1.0 mg/dL Final     Comment:     For infants and newborns, interpretation of results should be based  on gestational age, weight and in agreement with clinical  observations.  Premature Infant recommended reference ranges:  Up to 24 hours.............<8.0 mg/dL  Up to 48 hours............<12.0 mg/dL  3-5 days..................<15.0 mg/dL  6-29 days.................<15.0 mg/dL       Alkaline Phosphatase   Date Value Ref Range Status    01/26/2017 91 55 - 135 U/L Final     AST   Date Value Ref Range Status   01/26/2017 34 10 - 40 U/L Final     ALT   Date Value Ref Range Status   01/26/2017 25 10 - 44 U/L Final     Anion Gap   Date Value Ref Range Status   01/26/2017 12 8 - 16 mmol/L Final     eGFR if    Date Value Ref Range Status   01/26/2017 >60 >60 mL/min/1.73 m^2 Final     eGFR if non    Date Value Ref Range Status   01/26/2017 >60 >60 mL/min/1.73 m^2 Final     Comment:     Calculation used to obtain the estimated glomerular filtration  rate (eGFR) is the CKD-EPI equation. Since race is unknown   in our information system, the eGFR values for   -American and Non--American patients are given   for each creatinine result.         Diagnostic Results:  Abdominal XR: distended loops of bowel    Ct Abdomen Pelvis With Contrast    Result Date: 1/26/2017  Dilation of the small bowel throughout and significant distention of the proximal colon and transverse colon.  Abrupt change in caliber of the colon at the splenic flexure concerning for bowel obstruction, and obstructive process is not definitely identified although it is abutting the pancreatic tail mass and there could be some fibrotic type changes at the colonic splenic flexure, direct invasion or reactive inflammatory changes of the adjacent bowel or. Multiple tiny pulmonary nodules concerning for metastases or infection, unchanged. Small right pleural effusion. Sclerotic osseous lesions concerning for metastases.       ASSESSMENT/PLAN:     Camilo Johnson is a 53 yo F with pancreatic CA on chemo presents with abdominal pain likely due to large bowel obstruction  -Admit to surgery  -NPO  -NGT to low continuous suction  -Pain control with Toradol, IV Tylenol, and Dilaudid as last resort  -Serial abdominal exams  -VTE prophylaxis  -Protonix  -May consider enema    Joe Worrell MD  PGY-2

## 2017-01-26 NOTE — TELEPHONE ENCOUNTER
Spoke with pt stating that she has tried all of the medications that Dr. Stein requested that she try to have a bowel movement and she still has not had one and pt has started throwing up. Informed pt per Dr. Stein note that she needs to come in to the ED to be evaluated. Pt verbalizes understanding. Forward information to Dr. Stein.

## 2017-01-26 NOTE — PROVIDER PROGRESS NOTES - EMERGENCY DEPT.
Encounter Date: 1/26/2017    ED Physician Progress Notes             12:57 PM still has been unable to visualize KUB.  I have continued her care on the assumption that she has ileus or small bowel obstruction , but did not want to put in an NG tube until I see KUB; now she has failed second antiemetic  Treatment, and her emesis by nurse's report is draining copious as well as containing contrast material.  At this point in order to continue her care I will put orders to put an NG tube in to move forward with her care and hopefully alleviate her vomiting    I put in NG tube myself with nurse. 450 cc thick green fluid up    Pt now back from CT 4:04 PM

## 2017-01-26 NOTE — ED PROVIDER NOTES
Encounter Date: 2017       History     Chief Complaint   Patient presents with    Constipation     pancreatic cancer, having trouble passing stool. seen by Dr. Stein and sent to ER. now having n/v today     Review of patient's allergies indicates:  No Known Allergies  HPI Comments: 52-year-old patient with history of pancreatic cancer and chief complaint of generalized moderate abdominal pain (feels like contractions) worsening over the past one week, abdominal distention, no passage of gas for several days (last normal stool yesterday) and continual vomiting several times today.  Patient is unable to hold down even any water.  She was unable to take her pain medications today.  She reports passing one hard pebble-like stool a few hours prior to arrival today.    She is a patient of Dr. Stein    She denies any previous history of bowel obstruction.    Patient is a 52 y.o. female presenting with the following complaint: vomiting.   Emesis    This is a new problem. The current episode started today. The problem occurs 5 - 10 times per day. The problem has been unchanged. The emesis has an appearance of stomach contents. Associated symptoms include abdominal pain. Pertinent negatives include no chills, no cough, no diarrhea, no fever and no headaches.     Past Medical History   Diagnosis Date    Chemotherapy follow-up examination 2016    HTN (hypertension)     Pancreatic mass     Paronychia 2017    Sickle cell trait     SOB (shortness of breath) 2017    Swelling of lower extremity 2017     No past medical history pertinent negatives.  Past Surgical History   Procedure Laterality Date     section      Tubal ligation      Breast surgery       breast reduction     Family History   Problem Relation Age of Onset    Diabetes Mother     Stroke Father     Hypertension Father     Heart disease Father     Hypertension Brother     Diabetes Brother     Hypertension Brother       Social History   Substance Use Topics    Smoking status: Never Smoker    Smokeless tobacco: Never Used    Alcohol use Yes      Comment: 1 glass once or twice per week     Review of Systems   Constitutional: Negative for appetite change, chills, fatigue and fever.   HENT: Negative for nosebleeds, rhinorrhea, sneezing and sore throat.    Eyes: Negative for photophobia, pain and visual disturbance.   Respiratory: Negative for cough, chest tightness and shortness of breath.    Cardiovascular: Negative for chest pain, palpitations and leg swelling.   Gastrointestinal: Positive for abdominal distention, abdominal pain, constipation, nausea and vomiting. Negative for diarrhea.   Genitourinary: Negative for dysuria and urgency.   Musculoskeletal: Negative for back pain, gait problem, neck pain and neck stiffness.   Skin: Negative for color change and rash.   Neurological: Negative for weakness, light-headedness, numbness and headaches.   Hematological: Negative for adenopathy. Does not bruise/bleed easily.   Psychiatric/Behavioral: Negative for confusion, decreased concentration and suicidal ideas.       Physical Exam   Initial Vitals   BP Pulse Resp Temp SpO2   01/26/17 1000 01/26/17 1000 01/26/17 1000 01/26/17 1000 01/26/17 1000   140/86 118 18 98.1 °F (36.7 °C) 97 %     Physical Exam    Nursing note and vitals reviewed.  Constitutional: Vital signs are normal. She appears well-developed.  Non-toxic appearance. She appears ill.   HENT:   Head: Normocephalic and atraumatic.   Eyes: Conjunctivae and lids are normal.   Neck: Trachea normal and normal range of motion. Neck supple.   Cardiovascular: Normal rate, regular rhythm, normal heart sounds, intact distal pulses and normal pulses.   Lungs clear to auscultation B, no wheezes. Good air movement   Pulmonary/Chest: Breath sounds normal.   Abdominal: Soft. Normal appearance. She exhibits distension. Bowel sounds are decreased. There is no guarding.        Musculoskeletal: Normal range of motion.   Lymphadenopathy:     She has no cervical adenopathy.   Neurological: She is alert. She has normal strength and normal reflexes. No cranial nerve deficit or sensory deficit. She displays a negative Romberg sign. Coordination and gait normal. GCS eye subscore is 4. GCS verbal subscore is 5. GCS motor subscore is 6.   Skin: Skin is warm and dry.   Psychiatric: She has a normal mood and affect. Her speech is normal and behavior is normal.         ED Course   Critical Care  Date/Time: 1/26/2017 4:36 PM  Performed by: MARYAN RAMEY  Authorized by: MARYAN RAMEY   Direct patient critical care time: 15 minutes  Additional history critical care time: 3 minutes  Ordering / reviewing critical care time: 5 minutes  Documentation critical care time: 7 minutes  Consulting other physicians critical care time: 5 minutes  Total critical care time (exclusive of procedural time) : 35 minutes        Labs Reviewed   CBC W/ AUTO DIFFERENTIAL - Abnormal; Notable for the following:        Result Value    RDW 17.7 (*)     Platelets 458 (*)     Lymph # 0.9 (*)     Gran% 74.5 (*)     Lymph% 13.5 (*)     All other components within normal limits   COMPREHENSIVE METABOLIC PANEL - Abnormal; Notable for the following:     CO2 31 (*)     Glucose 124 (*)     Calcium 10.6 (*)     Total Bilirubin 1.2 (*)     All other components within normal limits   LIPASE   OCCULT BLOOD X 1, STOOL   URINALYSIS          X-Rays:   Independently Interpreted Readings:   Abdomen: No free air under diaphragm. There are multiple air-fluid levels indictating a small bowel obstruction.     Medical Decision Making:   History:   I obtained history from: someone other than patient.       <> Summary of History: Family ()  Old Medical Records: I decided to obtain old medical records.  Old Records Summarized: records from clinic visits and records from previous admission(s).  Other:   I have discussed this case  with another health care provider.       <> Summary of the Discussion: 3:09 PM spoke to surg resident to alert them of admit and discuss getting Ct without oral contrast per NG, as pt still putting out fluid from NG tube to suction (up to 450cc now), and I do not want her to aspirate in CT  3:38 PM spoke with GI fellow on call who will follow up on pt  4:02 PM GI fellow saw pt in ER and discussed some recs, which he will put in his note.  4:32 PM case discussed with Dr Srinivasan who will send a resident to admit pt     Additional MDM:   Comments: 52-year-old patient with history of pancreatic cancer on multiple pain medication who is had subacute abdominal pain several days of the following: not passing gas abdominal distention and constipation;   and vomiting several times this morning.  Patient has a distended abdomen with generalized discomfort that feels like contractions and decreased bowel sounds.  Patient's labs reviewed KUB examined.  Patient had vomiting despite antiemetics.  NG tube to relieve her of the fluid accumulating in her bowels improved nausea.  CT done with IV contrast does not deftly confirm that obstruction.  In speaking with GI who knows her well and saw her in the ER he is suspecting it may be ileus secondary to pain medication and is recommending medication bowel rest fluids and admit.  Case discussed with Dr. tasia Abdi, ultimately, will admit with GI following..                 ED Course     Clinical Impression:   The primary encounter diagnosis was Ileus. Diagnoses of Dehydration and Malignant neoplasm of pancreas, unspecified location of malignancy were also pertinent to this visit.          Rosario Kirk MD  01/26/17 6583

## 2017-01-27 PROBLEM — E86.0 DEHYDRATION: Status: ACTIVE | Noted: 2017-01-27

## 2017-01-27 LAB
ALBUMIN SERPL BCP-MCNC: 3.5 G/DL
ALP SERPL-CCNC: 74 U/L
ALT SERPL W/O P-5'-P-CCNC: 21 U/L
ANION GAP SERPL CALC-SCNC: 11 MMOL/L
ANISOCYTOSIS BLD QL SMEAR: SLIGHT
AST SERPL-CCNC: 25 U/L
BASOPHILS # BLD AUTO: 0.03 K/UL
BASOPHILS NFR BLD: 0.5 %
BILIRUB SERPL-MCNC: 0.9 MG/DL
BUN SERPL-MCNC: 9 MG/DL
CALCIUM SERPL-MCNC: 9.4 MG/DL
CHLORIDE SERPL-SCNC: 100 MMOL/L
CO2 SERPL-SCNC: 31 MMOL/L
CREAT SERPL-MCNC: 0.7 MG/DL
DIFFERENTIAL METHOD: ABNORMAL
EOSINOPHIL # BLD AUTO: 0 K/UL
EOSINOPHIL NFR BLD: 0.2 %
ERYTHROCYTE [DISTWIDTH] IN BLOOD BY AUTOMATED COUNT: 17.8 %
EST. GFR  (AFRICAN AMERICAN): >60 ML/MIN/1.73 M^2
EST. GFR  (NON AFRICAN AMERICAN): >60 ML/MIN/1.73 M^2
GLUCOSE SERPL-MCNC: 98 MG/DL
HCT VFR BLD AUTO: 33.6 %
HGB BLD-MCNC: 10.9 G/DL
HYPOCHROMIA BLD QL SMEAR: ABNORMAL
LYMPHOCYTES # BLD AUTO: 1.1 K/UL
LYMPHOCYTES NFR BLD: 16.1 %
MAGNESIUM SERPL-MCNC: 2 MG/DL
MCH RBC QN AUTO: 28.3 PG
MCHC RBC AUTO-ENTMCNC: 32.4 %
MCV RBC AUTO: 87 FL
MONOCYTES # BLD AUTO: 0.9 K/UL
MONOCYTES NFR BLD: 13.2 %
NEUTROPHILS # BLD AUTO: 4.6 K/UL
NEUTROPHILS NFR BLD: 70 %
OVALOCYTES BLD QL SMEAR: ABNORMAL
PHOSPHATE SERPL-MCNC: 4.6 MG/DL
PLATELET # BLD AUTO: 431 K/UL
PLATELET BLD QL SMEAR: ABNORMAL
PMV BLD AUTO: 9.3 FL
POIKILOCYTOSIS BLD QL SMEAR: SLIGHT
POTASSIUM SERPL-SCNC: 3.8 MMOL/L
PROT SERPL-MCNC: 6.7 G/DL
RBC # BLD AUTO: 3.85 M/UL
SODIUM SERPL-SCNC: 142 MMOL/L
WBC # BLD AUTO: 6.57 K/UL

## 2017-01-27 PROCEDURE — 11000001 HC ACUTE MED/SURG PRIVATE ROOM

## 2017-01-27 PROCEDURE — 99222 1ST HOSP IP/OBS MODERATE 55: CPT | Mod: ,,, | Performed by: INTERNAL MEDICINE

## 2017-01-27 PROCEDURE — 80053 COMPREHEN METABOLIC PANEL: CPT

## 2017-01-27 PROCEDURE — 85025 COMPLETE CBC W/AUTO DIFF WBC: CPT

## 2017-01-27 PROCEDURE — 25000003 PHARM REV CODE 250: Performed by: STUDENT IN AN ORGANIZED HEALTH CARE EDUCATION/TRAINING PROGRAM

## 2017-01-27 PROCEDURE — 36415 COLL VENOUS BLD VENIPUNCTURE: CPT

## 2017-01-27 PROCEDURE — 25500020 PHARM REV CODE 255: Performed by: SURGERY

## 2017-01-27 PROCEDURE — 84100 ASSAY OF PHOSPHORUS: CPT

## 2017-01-27 PROCEDURE — 94761 N-INVAS EAR/PLS OXIMETRY MLT: CPT

## 2017-01-27 PROCEDURE — 83735 ASSAY OF MAGNESIUM: CPT

## 2017-01-27 PROCEDURE — 63600175 PHARM REV CODE 636 W HCPCS: Performed by: STUDENT IN AN ORGANIZED HEALTH CARE EDUCATION/TRAINING PROGRAM

## 2017-01-27 PROCEDURE — 25000003 PHARM REV CODE 250: Performed by: SURGERY

## 2017-01-27 RX ORDER — AMLODIPINE BESYLATE 2.5 MG/1
2.5 TABLET ORAL DAILY
Status: DISCONTINUED | OUTPATIENT
Start: 2017-01-27 | End: 2017-01-31 | Stop reason: HOSPADM

## 2017-01-27 RX ORDER — CLINDAMYCIN PHOSPHATE 150 MG/ML
600 INJECTION, SOLUTION INTRAVENOUS
Status: DISCONTINUED | OUTPATIENT
Start: 2017-01-27 | End: 2017-01-27

## 2017-01-27 RX ORDER — GABAPENTIN 300 MG/1
300 CAPSULE ORAL 2 TIMES DAILY
Status: DISCONTINUED | OUTPATIENT
Start: 2017-01-27 | End: 2017-01-31 | Stop reason: HOSPADM

## 2017-01-27 RX ORDER — BISACODYL 5 MG
5 TABLET, DELAYED RELEASE (ENTERIC COATED) ORAL DAILY PRN
Status: DISCONTINUED | OUTPATIENT
Start: 2017-01-27 | End: 2017-01-31 | Stop reason: HOSPADM

## 2017-01-27 RX ORDER — HYDROMORPHONE HYDROCHLORIDE 2 MG/1
4 TABLET ORAL EVERY 6 HOURS PRN
Status: DISCONTINUED | OUTPATIENT
Start: 2017-01-27 | End: 2017-01-31 | Stop reason: HOSPADM

## 2017-01-27 RX ORDER — CLINDAMYCIN PHOSPHATE 600 MG/50ML
600 INJECTION, SOLUTION INTRAVENOUS
Status: DISCONTINUED | OUTPATIENT
Start: 2017-01-27 | End: 2017-01-31 | Stop reason: HOSPADM

## 2017-01-27 RX ORDER — MORPHINE SULFATE 15 MG/1
15 TABLET, FILM COATED, EXTENDED RELEASE ORAL EVERY 12 HOURS
Status: DISCONTINUED | OUTPATIENT
Start: 2017-01-27 | End: 2017-01-27

## 2017-01-27 RX ADMIN — CLINDAMYCIN IN 5 PERCENT DEXTROSE 600 MG: 12 INJECTION, SOLUTION INTRAVENOUS at 06:01

## 2017-01-27 RX ADMIN — ACETAMINOPHEN 1000 MG: 10 INJECTION, SOLUTION INTRAVENOUS at 06:01

## 2017-01-27 RX ADMIN — ONDANSETRON 8 MG: 8 TABLET, ORALLY DISINTEGRATING ORAL at 02:01

## 2017-01-27 RX ADMIN — SODIUM CHLORIDE: 0.9 INJECTION, SOLUTION INTRAVENOUS at 10:01

## 2017-01-27 RX ADMIN — DIATRIZOATE MEGLUMINE AND DIATRIZOATE SODIUM 600 ML: 660; 100 LIQUID ORAL; RECTAL at 02:01

## 2017-01-27 RX ADMIN — CLINDAMYCIN IN 5 PERCENT DEXTROSE 600 MG: 12 INJECTION, SOLUTION INTRAVENOUS at 10:01

## 2017-01-27 RX ADMIN — SODIUM CHLORIDE: 0.9 INJECTION, SOLUTION INTRAVENOUS at 11:01

## 2017-01-27 RX ADMIN — SODIUM PHOSPHATE, DIBASIC AND SODIUM PHOSPHATE, MONOBASIC 1 ENEMA: 7; 19 ENEMA RECTAL at 10:01

## 2017-01-27 RX ADMIN — ACETAMINOPHEN 1000 MG: 10 INJECTION, SOLUTION INTRAVENOUS at 03:01

## 2017-01-27 RX ADMIN — HYDROMORPHONE HYDROCHLORIDE 4 MG: 2 TABLET ORAL at 02:01

## 2017-01-27 RX ADMIN — AMLODIPINE BESYLATE 2.5 MG: 2.5 TABLET ORAL at 10:01

## 2017-01-27 RX ADMIN — ENOXAPARIN SODIUM 40 MG: 100 INJECTION SUBCUTANEOUS at 12:01

## 2017-01-27 RX ADMIN — HYDROMORPHONE HYDROCHLORIDE 4 MG: 2 TABLET ORAL at 10:01

## 2017-01-27 RX ADMIN — HYDROMORPHONE HYDROCHLORIDE 4 MG: 2 TABLET ORAL at 12:01

## 2017-01-27 NOTE — PLAN OF CARE
01/27/17 1253   Discharge Assessment   Assessment Type Discharge Planning Assessment   Confirmed/corrected address and phone number on facesheet? Yes   Assessment information obtained from? Patient   Expected Length of Stay (days) (to be determined)   Communicated expected length of stay with patient/caregiver yes   Prior to hospitilization cognitive status: Alert/Oriented   Prior to hospitalization functional status: Independent   Current cognitive status: Alert/Oriented   Current Functional Status: Needs Assistance  (N/G tube)   Arrived From home or self-care   Lives With (-  Jef Johnson [phone])   Able to Return to Prior Arrangements yes   Is patient able to care for self after discharge? Yes   How many people do you have in your home that can help with your care after discharge? 1   Who are your caregiver(s) and their phone number(s)? - Genaro Stout  [phone]   Patient's perception of discharge disposition home or selfcare   Readmission Within The Last 30 Days no previous admission in last 30 days   Patient currently being followed by outpatient case management? No   Patient currently receives home health services? No   Does the patient currently use HME? No   Patient currently receives private duty nursing? No   Patient currently receives any other outside agency services? No   Equipment Currently Used at Home none   Do you have any problems affording any of your prescribed medications? Yes   Is the patient taking medications as prescribed? yes   Do you have any financial concerns preventing you from receiving the healthcare you need? No   Does the patient have transportation to healthcare appointments? Yes   Transportation Available family or friend will provide   On Dialysis? No   Does the patient receive services at the [facility]mad[facility] Clinic? No   Are there any open cases? No   Discharge Plan A Home   Discharge Plan B Home with family   Patient/Family In Agreement With Plan yes     Yadira SALCEDO  [facility] Transitional Navigator  [phone]

## 2017-01-27 NOTE — PROGRESS NOTES
Progress Note  General Surgery    Admit Date: 1/26/2017  Post-operative Day:    Hospital Day: 2    SUBJECTIVE:     NAEO. Patient felt much improved following NGT placement. She is requesting a diet this morning but told her we need to wait until having more frequent flatus and stool output. She reports passing gas with small amount of stool last night. No nausea or emesis last night. No fever or chills. Able to ambulate to void.       Scheduled Meds:   acetaminophen  1,000 mg Intravenous Q8H    amlodipine  2.5 mg Oral Daily    clindamycin  600 mg Intramuscular Q8H    enoxaparin  40 mg Subcutaneous Daily    gabapentin  300 mg Oral BID    morphine  15 mg Oral Q12H     Continuous Infusions:   sodium chloride 0.9% 125 mL/hr at 01/26/17 1847     PRN Meds:bisacodyl, diphenhydrAMINE, HYDROmorphone, HYDROmorphone, ketorolac, ondansetron, promethazine (PHENERGAN) IVPB, ramelteon    Review of patient's allergies indicates:  No Known Allergies      OBJECTIVE:     Vital Signs (Most Recent)  Temp: 97.4 °F (36.3 °C) (01/27/17 0747)  Pulse: 83 (01/27/17 0747)  Resp: 17 (01/27/17 0747)  BP: 139/80 (01/27/17 0747)  SpO2: 97 % (01/27/17 0511)    Vital Signs Range (Last 24H):  Temp:  [97.4 °F (36.3 °C)-98.5 °F (36.9 °C)]   Pulse:  []   Resp:  [17-20]   BP: (134-174)/()   SpO2:  [97 %-100 %]     I & O (Last 24H):  Intake/Output Summary (Last 24 hours) at 01/27/17 0827  Last data filed at 01/27/17 0700   Gross per 24 hour   Intake             1700 ml   Output             1000 ml   Net              700 ml       NGT: 600 cc of dark green brown fluid    Physical Exam:  NAD, resting comfortably  NGT in place  Non labored breathing  Tachycardic  Abdomen: less distended compared to yesterday, soft, Mild TTP to LUQ, active bowel sounds    Laboratory:  Lab Results   Component Value Date    WBC 6.57 01/27/2017    HGB 10.9 (L) 01/27/2017    HCT 33.6 (L) 01/27/2017    MCV 87 01/27/2017     (H) 01/27/2017     CMP  Sodium    Date Value Ref Range Status   01/27/2017 142 136 - 145 mmol/L Final     Potassium   Date Value Ref Range Status   01/27/2017 3.8 3.5 - 5.1 mmol/L Final     Chloride   Date Value Ref Range Status   01/27/2017 100 95 - 110 mmol/L Final     CO2   Date Value Ref Range Status   01/27/2017 31 (H) 23 - 29 mmol/L Final     Glucose   Date Value Ref Range Status   01/27/2017 98 70 - 110 mg/dL Final     BUN, Bld   Date Value Ref Range Status   01/27/2017 9 6 - 20 mg/dL Final     Creatinine   Date Value Ref Range Status   01/27/2017 0.7 0.5 - 1.4 mg/dL Final     Calcium   Date Value Ref Range Status   01/27/2017 9.4 8.7 - 10.5 mg/dL Final     Total Protein   Date Value Ref Range Status   01/27/2017 6.7 6.0 - 8.4 g/dL Final     Albumin   Date Value Ref Range Status   01/27/2017 3.5 3.5 - 5.2 g/dL Final     Total Bilirubin   Date Value Ref Range Status   01/27/2017 0.9 0.1 - 1.0 mg/dL Final     Comment:     For infants and newborns, interpretation of results should be based  on gestational age, weight and in agreement with clinical  observations.  Premature Infant recommended reference ranges:  Up to 24 hours.............<8.0 mg/dL  Up to 48 hours............<12.0 mg/dL  3-5 days..................<15.0 mg/dL  6-29 days.................<15.0 mg/dL       Alkaline Phosphatase   Date Value Ref Range Status   01/27/2017 74 55 - 135 U/L Final     AST   Date Value Ref Range Status   01/27/2017 25 10 - 40 U/L Final     ALT   Date Value Ref Range Status   01/27/2017 21 10 - 44 U/L Final     Anion Gap   Date Value Ref Range Status   01/27/2017 11 8 - 16 mmol/L Final     eGFR if    Date Value Ref Range Status   01/27/2017 >60 >60 mL/min/1.73 m^2 Final     eGFR if non    Date Value Ref Range Status   01/27/2017 >60 >60 mL/min/1.73 m^2 Final     Comment:     Calculation used to obtain the estimated glomerular filtration  rate (eGFR) is the CKD-EPI equation. Since race is unknown   in our information system,  the eGFR values for   -American and Non--American patients are given   for each creatinine result.       Lab Results   Component Value Date    LIPASE 10 01/26/2017         Diagnostic Results:  None    ASSESSMENT/PLAN:     Camilo Johnson is a 51 yo F with Pancreatic CA admitted for Large Bowel Obstruction  -Keep NGT in place  -NPO with IVF  -Enema to assist with bowel function  -f/u with Oncology  -f/u recs from GI  -Resume home medications  -Pain control with Toradol    Joe Worrell MD  PGY-2

## 2017-01-27 NOTE — PROGRESS NOTES
.Pharmacy New Medication Education     Patient accepted medication education.     Patient wants BEDSIDE DELIVERY. Pharmacy has been updated in the system.     Pharmacy educated patient on the following medications, using the teach-back method.   Benadryl  Lovenox  Neurontion  Dilaudid  Zofran  Phenergan  ramelteon     Learners of pharmacy medication education included:  patient     Patient +/- learner response:  verbalize understanding

## 2017-01-27 NOTE — PLAN OF CARE
Pt requesting for increase of IV Dilaudid dosage. Per Dr. Ordaz resume home dilaudid dose 4mg PO Q6 PRN and hold IV dilaudid until further review of pain medication in the am. Also clamp NGT when giving PO. Will carry out orders and monitor.

## 2017-01-27 NOTE — PROGRESS NOTES
"[facility]    CC: constipation    HPI 52 y.o. female with HTN, SC trait, and metastatic pancreatic cancer on chemotherapy who was seen in clinic yesterday, and is now in the ER with chronic, worsening, severe constipation associated with abdominal pain. She has significant cancer related pain and takes morphine and dilaudid for this. Over the past month or two, she has become constipated with frequent missed BMs and abdominal fullness/bloating, and loss of appetite. She denies diarrhea and hard stools, as well as blood in stool. She started Miralax daily 5 days ago which has helped some.        Her family member was at the bedside and provided additional history.  The NG tube has provided relief from N/V.  Reports passing gas and having a small amount of stool yesterday..  Denies Fevers, chills.    Past Medical History    has a past medical history of Chemotherapy follow-up examination (12/28/2016); HTN (hypertension); Pancreatic mass; Paronychia (1/23/2017); Sickle cell trait; SOB (shortness of breath) (1/23/2017); and Swelling of lower extremity (1/23/2017).      Review of Systems  General ROS: negative for chills, fever or weight loss  Cardiovascular ROS: no chest pain or dyspnea on exertion  Gastrointestinal ROS: +abdominal pain, nausea, vomiting, and constipation, no GI bleeding    Physical Examination  Visit Vitals    /80 (Patient Position: Lying, BP Method: Automatic)    Pulse 83    Temp 97.4 °F (36.3 °C) (Oral)    Resp 17    Ht 5' 4" (1.626 m)    Wt 74.8 kg (165 lb)    SpO2 97%    Breastfeeding No    BMI 28.32 kg/m2     General appearance: alert, cooperative, no distress  HENT: Normocephalic, atraumatic, neck symmetrical, no nasal discharge   Lungs: clear to auscultation in all fields, symmetric chest wall expansion bilaterally, no wheeze, rale, or rhonchi  Heart: normal rate, regular rhythm without rub; palpable peripheral pulsesI   Abdomen: soft, nondistended, mildly tender to " palpation diffusely without guarding or rebound, normal bowel sounds without high pitched tinkling  Extremities: extremities symmetric; no clubbing, cyanosis, or edema  Neurologic: Alert and oriented X 3, normal strength, normal coordination    Labs:    WBC 6.57  H/H 10.9/33.6  Platelets 431    Cr 0.7  Bilirubin 0.9    Imaging:    CT Abdomen Pelvis 1/26  Dilation of small bowel and distention of proximal and transverse colon. Concerning for bowel obstruction near splenic flexure.    I have personally reviewed these images.    Assessment:   53 yo AAF with SC trait, HTN, and metastatic pancreatic cancer on chemotherapy requiring narcotic pain medication, with worsening abdominal pain with abdominal distension, constipation, and a large amount of stool in the colon seen on recent imaging. Now with ER presentation for obstipation with nausea and vomiting; differential includes severe opioid induced constipation, and bowel obstruction.    Plan:  - Continue NG tube decompression, IV fluid support, and lab monitoring  - BID enemas, either tap water or soap suds  - Serial abdominal exams  - KUB every 1-3 days, depending on clinical course    Mesha Manley MD  [facility]  [facility]

## 2017-01-27 NOTE — CONSULTS
NOLANETS:  [facility]  A collaboration between [facility] and [facility]    PATIENT: Camilo Johnson  MRN: [id number]  DATE: 2017  REQUESTING PHYSICIAN: Salma Schwab MD    Reason for consult: pancreatic cancer       Subjective:   History of Present Illness: Ms. Camilo Johnson is a 52 y.o. female who I'm asked to see for pancreatic cancer.  She is a patient of mine who I currently have on treatment with gemcitabine and nab paclitaxel.  Recent CT scan had shown stability of disease.  She is hospitalized today with worsening abdominal pain and was found to have a small bowel obstruction.  She had an NG tube placed with some resolution of her symptoms.  She complains of some ongoing nausea but pain is well controlled.  No other new complaints.    Past Medical History:   Past Medical History   Diagnosis Date    Chemotherapy follow-up examination 2016    HTN (hypertension)     Pancreatic mass     Paronychia 2017    Sickle cell trait     SOB (shortness of breath) 2017    Swelling of lower extremity 2017       Past Surgical History:   Past Surgical History   Procedure Laterality Date     section      Tubal ligation      Breast surgery  1995     breast reduction       Family History:   Family History   Problem Relation Age of Onset    Diabetes Mother     Stroke Father     Hypertension Father     Heart disease Father     Hypertension Brother     Diabetes Brother     Hypertension Brother        Social History:  reports that she has never smoked. She has never used smokeless tobacco. She reports that she drinks alcohol. She reports that she does not use illicit drugs.    Allergies:  Review of patient's allergies indicates:  No Known Allergies    Medications:  Current Facility-Administered Medications   Medication Dose Route Frequency Provider Last Rate Last Dose    0.9%  NaCl  infusion   Intravenous Continuous Tony Worrell  mL/hr at 01/27/17 1106      acetaminophen (10 mg/mL) injection 1,000 mg  1,000 mg Intravenous Q8H Tony Worrell  mL/hr at 01/27/17 0614 1,000 mg at 01/27/17 0614    amlodipine tablet 2.5 mg  2.5 mg Oral Daily Brooks Ordaz MD   2.5 mg at 01/27/17 1057    bisacodyl EC tablet 5 mg  5 mg Oral Daily PRN Tony Worrell MD        clindamycin 600 MG/50 ML D5W 600 mg/50 mL IVPB 600 mg  600 mg Intravenous Q8H Tony Worrell  mL/hr at 01/27/17 1059 600 mg at 01/27/17 1059    diphenhydrAMINE injection 25 mg  25 mg Intravenous Q4H PRN Tony Worrell MD        enoxaparin injection 40 mg  40 mg Subcutaneous Daily Tony Worrell MD   40 mg at 01/27/17 1241    gabapentin capsule 300 mg  300 mg Oral BID Brooks Ordaz MD   300 mg at 01/27/17 0900    hydromorphone (PF) injection 0.5 mg  0.5 mg Intravenous Q6H PRN Tony Worrell MD   0.5 mg at 01/26/17 2039    HYDROmorphone tablet 4 mg  4 mg Oral Q6H PRN Brooks Ordaz MD   4 mg at 01/27/17 1241    ketorolac injection 30 mg  30 mg Intravenous Q6H PRN Tony Worrell MD   30 mg at 01/26/17 2325    ondansetron disintegrating tablet 8 mg  8 mg Oral Q8H PRN Tony Worrell MD   8 mg at 01/27/17 0256    promethazine (PHENERGAN) 6.25 mg in dextrose 5 % 50 mL IVPB  6.25 mg Intravenous Q6H PRN Tony Worrell MD        ramelteon tablet 8 mg  8 mg Oral Nightly PRN Tony Worrell MD             ROS:  Constitutional: negative for fevers, chills, sweats and weight loss  Eyes: negative for visual disturbance and redness  Ears, nose, mouth, throat, and face: negative for sore throat and voice change  Respiratory: negative for cough, hemoptysis, wheezing, dyspnea on exertion or shortness of breath  Cardiovascular: negative for chest pain, palpitations  Gastrointestinal: positive for nausea and abdominal distension   Integument/breast: negative for rash and skin  "lesion(s)  Hematologic/lymphatic: negative for easy bruising, bleeding, lymphadenopathy and petechiae  Musculoskeletal:negative for arthralgias and back pain  Neurological: negative for headaches and weakness    ECOG Performance Status: 2   Objective:      Vitals:   Vitals:    01/26/17 2206 01/27/17 0505 01/27/17 0511 01/27/17 0747   BP: (!) 164/99 (!) 158/89  139/80   BP Location: Left arm      Patient Position: Lying Lying  Lying   BP Method:  Automatic  Automatic   Pulse: 102 103  83   Resp: 18 20 17   Temp: 98.1 °F (36.7 °C) 97.4 °F (36.3 °C)  97.4 °F (36.3 °C)   TempSrc: Oral Oral  Oral   SpO2:   97%    Weight:       Height: 5' 4" (1.626 m)        BMI: Body mass index is 28.32 kg/(m^2).    Physical Exam:  General appearance - alert, well appearing, and in no distress, NGT in place  Mental status - alert, oriented to person, place, and time  Eyes - sclera anicteric  Mouth - mucous membranes moist, pharynx normal without lesions  Neck - supple, no significant adenopathy  Lymphatics - no cervical, supraclavicular or axillary lymphadenopathy  Chest - clear to auscultation, no wheezes, rales or rhonchi, symmetric air entry  Heart - normal rate, regular rhythm, normal S1, S2, no murmurs, rubs, clicks or gallops  Abdomen - soft, nontender, distended, no masses or organomegaly  Neurological - alert, oriented, normal speech, no focal findings or movement disorder noted  Extremities - no peripheral edema noted    Laboratory Data:  Results for orders placed or performed during the hospital encounter of 01/26/17   CBC auto differential   Result Value Ref Range    WBC 6.57 3.90 - 12.70 K/uL    RBC 3.85 (L) 4.00 - 5.40 M/uL    Hemoglobin 10.9 (L) 12.0 - 16.0 g/dL    Hematocrit 33.6 (L) 37.0 - 48.5 %    MCV 87 82 - 98 fL    MCH 28.3 27.0 - 31.0 pg    MCHC 32.4 32.0 - 36.0 %    RDW 17.8 (H) 11.5 - 14.5 %    Platelets 431 (H) 150 - 350 K/uL    MPV 9.3 9.2 - 12.9 fL    Gran # 4.6 1.8 - 7.7 K/uL    Lymph # 1.1 1.0 - 4.8 K/uL    " Mono # 0.9 0.3 - 1.0 K/uL    Eos # 0.0 0.0 - 0.5 K/uL    Baso # 0.03 0.00 - 0.20 K/uL    Gran% 70.0 38.0 - 73.0 %    Lymph% 16.1 (L) 18.0 - 48.0 %    Mono% 13.2 4.0 - 15.0 %    Eosinophil% 0.2 0.0 - 8.0 %    Basophil% 0.5 0.0 - 1.9 %    Platelet Estimate Increased (A)     Aniso Slight     Poik Slight     Hypo Occasional     Ovalocytes Occasional     Differential Method Automated      Results for orders placed or performed during the hospital encounter of 01/26/17   Comprehensive metabolic panel   Result Value Ref Range    Sodium 142 136 - 145 mmol/L    Potassium 3.8 3.5 - 5.1 mmol/L    Chloride 100 95 - 110 mmol/L    CO2 31 (H) 23 - 29 mmol/L    Glucose 98 70 - 110 mg/dL    BUN, Bld 9 6 - 20 mg/dL    Creatinine 0.7 0.5 - 1.4 mg/dL    Calcium 9.4 8.7 - 10.5 mg/dL    Total Protein 6.7 6.0 - 8.4 g/dL    Albumin 3.5 3.5 - 5.2 g/dL    Total Bilirubin 0.9 0.1 - 1.0 mg/dL    Alkaline Phosphatase 74 55 - 135 U/L    AST 25 10 - 40 U/L    ALT 21 10 - 44 U/L    Anion Gap 11 8 - 16 mmol/L    eGFR if African American >60 >60 mL/min/1.73 m^2    eGFR if non African American >60 >60 mL/min/1.73 m^2     Results for orders placed or performed during the hospital encounter of 01/26/17   Magnesium   Result Value Ref Range    Magnesium 2.0 1.6 - 2.6 mg/dL          Assessment:   1.  Pancreatic cancer  2.  Small bowel obstruction  3.  Chemo follow up      Plan:     Mrs Johnson is currently being managed for her small bowel obstruction with NG tube decompression.  Her symptoms have improved.  In terms of management for her pancreatic cancer, I have reviewed her CT which does not show evidence of progressive disease.  If we are able to continue treatment for her small bowel obstruction and relieve this she may be a candidate for further therapy.  She is scheduled for chemotherapy next week.  Labs are appropriate.  We'll await recovery from her small bowel obstruction prior to scheduling additional chemotherapy.  Please call with further  questions.      Sebas Zaman DO, FACP  Hematology & Oncology, [facility]  [address]  [place]  [address]  ph. [phone]; [phone]  fax. [phone]

## 2017-01-27 NOTE — PLAN OF CARE
"Pt concerned about receiving home medications in the morning , Dr. Ordaz notified states " she can receive home medications. Also notified of bp 169/99, no new orders placed.  "

## 2017-01-28 PROBLEM — C78.7 PANCREATIC CANCER METASTASIZED TO LIVER: Status: ACTIVE | Noted: 2017-01-28

## 2017-01-28 PROBLEM — C25.9 PANCREATIC CANCER METASTASIZED TO LIVER: Status: ACTIVE | Noted: 2017-01-28

## 2017-01-28 PROCEDURE — 94761 N-INVAS EAR/PLS OXIMETRY MLT: CPT

## 2017-01-28 PROCEDURE — 11000001 HC ACUTE MED/SURG PRIVATE ROOM

## 2017-01-28 PROCEDURE — 25000003 PHARM REV CODE 250: Performed by: SURGERY

## 2017-01-28 PROCEDURE — 25000003 PHARM REV CODE 250: Performed by: STUDENT IN AN ORGANIZED HEALTH CARE EDUCATION/TRAINING PROGRAM

## 2017-01-28 PROCEDURE — 63600175 PHARM REV CODE 636 W HCPCS: Performed by: STUDENT IN AN ORGANIZED HEALTH CARE EDUCATION/TRAINING PROGRAM

## 2017-01-28 PROCEDURE — 99232 SBSQ HOSP IP/OBS MODERATE 35: CPT | Mod: ,,, | Performed by: INTERNAL MEDICINE

## 2017-01-28 RX ADMIN — CLINDAMYCIN IN 5 PERCENT DEXTROSE 600 MG: 12 INJECTION, SOLUTION INTRAVENOUS at 01:01

## 2017-01-28 RX ADMIN — CLINDAMYCIN IN 5 PERCENT DEXTROSE 600 MG: 12 INJECTION, SOLUTION INTRAVENOUS at 05:01

## 2017-01-28 RX ADMIN — RAMELTEON 8 MG: 8 TABLET, FILM COATED ORAL at 09:01

## 2017-01-28 RX ADMIN — ENOXAPARIN SODIUM 40 MG: 100 INJECTION SUBCUTANEOUS at 01:01

## 2017-01-28 RX ADMIN — KETOROLAC TROMETHAMINE 30 MG: 30 INJECTION, SOLUTION INTRAMUSCULAR at 01:01

## 2017-01-28 RX ADMIN — AMLODIPINE BESYLATE 2.5 MG: 2.5 TABLET ORAL at 08:01

## 2017-01-28 RX ADMIN — LEUCINE, PHENYLALANINE, LYSINE, METHIONINE, ISOLEUCINE, VALINE, HISTIDINE, THREONINE, TRYPTOPHAN, ALANINE, GLYCINE, ARGININE, PROLINE, SERINE, TYROSINE, SODIUM ACETATE, DIBASIC POTASSIUM PHOSPHATE, MAGNESIUM CHLORIDE, SODIUM CHLORIDE, CALCIUM CHLORIDE, DEXTROSE
201; 154; 159; 110; 165; 160; 132; 116; 50; 570; 283; 316; 187; 138; 11; 217; 261; 51; 112; 33; 10 INJECTION INTRAVENOUS at 01:01

## 2017-01-28 RX ADMIN — GABAPENTIN 300 MG: 300 CAPSULE ORAL at 09:01

## 2017-01-28 RX ADMIN — RAMELTEON 8 MG: 8 TABLET, FILM COATED ORAL at 12:01

## 2017-01-28 RX ADMIN — CLINDAMYCIN IN 5 PERCENT DEXTROSE 600 MG: 12 INJECTION, SOLUTION INTRAVENOUS at 08:01

## 2017-01-28 RX ADMIN — HYDROMORPHONE HYDROCHLORIDE 4 MG: 2 TABLET ORAL at 08:01

## 2017-01-28 RX ADMIN — DIPHENHYDRAMINE HYDROCHLORIDE 25 MG: 50 INJECTION, SOLUTION INTRAMUSCULAR; INTRAVENOUS at 11:01

## 2017-01-28 RX ADMIN — HYDROMORPHONE HYDROCHLORIDE 4 MG: 2 TABLET ORAL at 04:01

## 2017-01-28 NOTE — PLAN OF CARE
Problem: Patient Care Overview  Goal: Plan of Care Review  Outcome: Ongoing (interventions implemented as appropriate)  Pt AAOx4, denies pain at this time, denies nausea.  NG tube to low continuous wall suction, 900 output for entire shift.  Pt had 3 large bowel movements today.  IV fluids infusing as ordered.  Safety maintained, call light in reach, wheels locked, bed in lowest position, family at bedside, will continue to monitor and reassess.   Goal: Discharge Needs Assessment  Outcome: Ongoing (interventions implemented as appropriate)

## 2017-01-28 NOTE — PROGRESS NOTES
Progress Note  General Surgery    Admit Date: 1/26/2017  Post-operative Day:    Hospital Day: 3    SUBJECTIVE:     Pt continues to feel better with NGT decompression.  No bowel function.  + OOB.  Denies n/v.  Found to have complete colonic obstruction at splenic flexure.  For stent on Monday with GI.       Scheduled Meds:   amlodipine  2.5 mg Oral Daily    clindamycin 600 MG/50 ML D5W  600 mg Intravenous Q8H    enoxaparin  40 mg Subcutaneous Daily    gabapentin  300 mg Oral BID     Continuous Infusions:   sodium chloride 0.9% 125 mL/hr at 01/27/17 2244     PRN Meds:bisacodyl, diphenhydrAMINE, HYDROmorphone, HYDROmorphone, ketorolac, ondansetron, promethazine (PHENERGAN) IVPB, ramelteon    Review of patient's allergies indicates:  No Known Allergies      OBJECTIVE:     Vital Signs (Most Recent)  Temp: 98.3 °F (36.8 °C) (01/28/17 0500)  Pulse: 89 (01/28/17 0500)  Resp: 17 (01/28/17 0500)  BP: 139/73 (01/28/17 0500)  SpO2: 97 % (01/28/17 0503)    Vital Signs Range (Last 24H):  Temp:  [97.4 °F (36.3 °C)-98.9 °F (37.2 °C)]   Pulse:  [75-89]   Resp:  [17-18]   BP: (139-156)/(73-87)   SpO2:  [95 %-98 %]     I & O (Last 24H):    Intake/Output Summary (Last 24 hours) at 01/28/17 0650  Last data filed at 01/28/17 0443   Gross per 24 hour   Intake             1570 ml   Output             2800 ml   Net            -1230 ml       NGT: 1700 cc of dark green brown fluid    Physical Exam:  NAD, resting comfortably  NGT in place  Non labored breathing  RRR   abd soft, mildly distended but decreased, nt     Laboratory:  Lab Results   Component Value Date    WBC 6.57 01/27/2017    HGB 10.9 (L) 01/27/2017    HCT 33.6 (L) 01/27/2017    MCV 87 01/27/2017     (H) 01/27/2017     CMP  Sodium   Date Value Ref Range Status   01/27/2017 142 136 - 145 mmol/L Final     Potassium   Date Value Ref Range Status   01/27/2017 3.8 3.5 - 5.1 mmol/L Final     Chloride   Date Value Ref Range Status   01/27/2017 100 95 - 110 mmol/L Final      CO2   Date Value Ref Range Status   01/27/2017 31 (H) 23 - 29 mmol/L Final     Glucose   Date Value Ref Range Status   01/27/2017 98 70 - 110 mg/dL Final     BUN, Bld   Date Value Ref Range Status   01/27/2017 9 6 - 20 mg/dL Final     Creatinine   Date Value Ref Range Status   01/27/2017 0.7 0.5 - 1.4 mg/dL Final     Calcium   Date Value Ref Range Status   01/27/2017 9.4 8.7 - 10.5 mg/dL Final     Total Protein   Date Value Ref Range Status   01/27/2017 6.7 6.0 - 8.4 g/dL Final     Albumin   Date Value Ref Range Status   01/27/2017 3.5 3.5 - 5.2 g/dL Final     Total Bilirubin   Date Value Ref Range Status   01/27/2017 0.9 0.1 - 1.0 mg/dL Final     Comment:     For infants and newborns, interpretation of results should be based  on gestational age, weight and in agreement with clinical  observations.  Premature Infant recommended reference ranges:  Up to 24 hours.............<8.0 mg/dL  Up to 48 hours............<12.0 mg/dL  3-5 days..................<15.0 mg/dL  6-29 days.................<15.0 mg/dL       Alkaline Phosphatase   Date Value Ref Range Status   01/27/2017 74 55 - 135 U/L Final     AST   Date Value Ref Range Status   01/27/2017 25 10 - 40 U/L Final     ALT   Date Value Ref Range Status   01/27/2017 21 10 - 44 U/L Final     Anion Gap   Date Value Ref Range Status   01/27/2017 11 8 - 16 mmol/L Final     eGFR if    Date Value Ref Range Status   01/27/2017 >60 >60 mL/min/1.73 m^2 Final     eGFR if non    Date Value Ref Range Status   01/27/2017 >60 >60 mL/min/1.73 m^2 Final     Comment:     Calculation used to obtain the estimated glomerular filtration  rate (eGFR) is the CKD-EPI equation. Since race is unknown   in our information system, the eGFR values for   -American and Non--American patients are given   for each creatinine result.       Lab Results   Component Value Date    LIPASE 10 01/26/2017         Diagnostic Results:  None    ASSESSMENT/PLAN:      Camilo Johnson is a 51 yo F with Pancreatic CA admitted for Large Bowel Obstruction  - cont NPO/NGT  - start PPN  - OOB/ambulate/IS  - f/u labs   - for colonic stent Monday    Brooks Ordaz MD

## 2017-01-29 LAB
ALBUMIN SERPL BCP-MCNC: 3.7 G/DL
ALP SERPL-CCNC: 67 U/L
ALT SERPL W/O P-5'-P-CCNC: 18 U/L
ANION GAP SERPL CALC-SCNC: 9 MMOL/L
AST SERPL-CCNC: 25 U/L
BASOPHILS # BLD AUTO: 0.04 K/UL
BASOPHILS NFR BLD: 0.6 %
BILIRUB SERPL-MCNC: 0.6 MG/DL
BUN SERPL-MCNC: 11 MG/DL
CALCIUM SERPL-MCNC: 9.8 MG/DL
CHLORIDE SERPL-SCNC: 97 MMOL/L
CO2 SERPL-SCNC: 33 MMOL/L
CREAT SERPL-MCNC: 0.7 MG/DL
DIFFERENTIAL METHOD: ABNORMAL
EOSINOPHIL # BLD AUTO: 0 K/UL
EOSINOPHIL NFR BLD: 0.3 %
ERYTHROCYTE [DISTWIDTH] IN BLOOD BY AUTOMATED COUNT: 17.5 %
EST. GFR  (AFRICAN AMERICAN): >60 ML/MIN/1.73 M^2
EST. GFR  (NON AFRICAN AMERICAN): >60 ML/MIN/1.73 M^2
GLUCOSE SERPL-MCNC: 173 MG/DL
HCT VFR BLD AUTO: 36.3 %
HGB BLD-MCNC: 12.1 G/DL
LYMPHOCYTES # BLD AUTO: 1.2 K/UL
LYMPHOCYTES NFR BLD: 18.1 %
MCH RBC QN AUTO: 28.7 PG
MCHC RBC AUTO-ENTMCNC: 33.3 %
MCV RBC AUTO: 86 FL
MONOCYTES # BLD AUTO: 0.9 K/UL
MONOCYTES NFR BLD: 14.5 %
NEUTROPHILS # BLD AUTO: 4.3 K/UL
NEUTROPHILS NFR BLD: 66.3 %
PLATELET # BLD AUTO: 463 K/UL
PMV BLD AUTO: 9.2 FL
POTASSIUM SERPL-SCNC: 3.6 MMOL/L
PREALB SERPL-MCNC: 16 MG/DL
PROT SERPL-MCNC: 7.4 G/DL
RBC # BLD AUTO: 4.21 M/UL
SODIUM SERPL-SCNC: 139 MMOL/L
WBC # BLD AUTO: 6.48 K/UL

## 2017-01-29 PROCEDURE — 63600175 PHARM REV CODE 636 W HCPCS: Performed by: STUDENT IN AN ORGANIZED HEALTH CARE EDUCATION/TRAINING PROGRAM

## 2017-01-29 PROCEDURE — 84134 ASSAY OF PREALBUMIN: CPT

## 2017-01-29 PROCEDURE — 94761 N-INVAS EAR/PLS OXIMETRY MLT: CPT

## 2017-01-29 PROCEDURE — 25000003 PHARM REV CODE 250: Performed by: STUDENT IN AN ORGANIZED HEALTH CARE EDUCATION/TRAINING PROGRAM

## 2017-01-29 PROCEDURE — 80053 COMPREHEN METABOLIC PANEL: CPT

## 2017-01-29 PROCEDURE — 25000003 PHARM REV CODE 250: Performed by: SURGERY

## 2017-01-29 PROCEDURE — 99232 SBSQ HOSP IP/OBS MODERATE 35: CPT | Mod: ,,, | Performed by: INTERNAL MEDICINE

## 2017-01-29 PROCEDURE — 36415 COLL VENOUS BLD VENIPUNCTURE: CPT

## 2017-01-29 PROCEDURE — 85025 COMPLETE CBC W/AUTO DIFF WBC: CPT

## 2017-01-29 PROCEDURE — 11000001 HC ACUTE MED/SURG PRIVATE ROOM

## 2017-01-29 RX ADMIN — HYDROMORPHONE HYDROCHLORIDE 0.5 MG: 1 INJECTION, SOLUTION INTRAMUSCULAR; INTRAVENOUS; SUBCUTANEOUS at 10:01

## 2017-01-29 RX ADMIN — HYDROMORPHONE HYDROCHLORIDE 4 MG: 2 TABLET ORAL at 08:01

## 2017-01-29 RX ADMIN — CLINDAMYCIN IN 5 PERCENT DEXTROSE 600 MG: 12 INJECTION, SOLUTION INTRAVENOUS at 01:01

## 2017-01-29 RX ADMIN — HYDROMORPHONE HYDROCHLORIDE 4 MG: 2 TABLET ORAL at 12:01

## 2017-01-29 RX ADMIN — ENOXAPARIN SODIUM 40 MG: 100 INJECTION SUBCUTANEOUS at 12:01

## 2017-01-29 RX ADMIN — LEUCINE, PHENYLALANINE, LYSINE, METHIONINE, ISOLEUCINE, VALINE, HISTIDINE, THREONINE, TRYPTOPHAN, ALANINE, GLYCINE, ARGININE, PROLINE, SERINE, TYROSINE, SODIUM ACETATE, DIBASIC POTASSIUM PHOSPHATE, MAGNESIUM CHLORIDE, SODIUM CHLORIDE, CALCIUM CHLORIDE, DEXTROSE
201; 154; 159; 110; 165; 160; 132; 116; 50; 570; 283; 316; 187; 138; 11; 217; 261; 51; 112; 33; 10 INJECTION INTRAVENOUS at 09:01

## 2017-01-29 RX ADMIN — DIPHENHYDRAMINE HYDROCHLORIDE 25 MG: 50 INJECTION, SOLUTION INTRAMUSCULAR; INTRAVENOUS at 01:01

## 2017-01-29 RX ADMIN — CLINDAMYCIN IN 5 PERCENT DEXTROSE 600 MG: 12 INJECTION, SOLUTION INTRAVENOUS at 09:01

## 2017-01-29 RX ADMIN — KETOROLAC TROMETHAMINE 30 MG: 30 INJECTION, SOLUTION INTRAMUSCULAR at 12:01

## 2017-01-29 RX ADMIN — GABAPENTIN 300 MG: 300 CAPSULE ORAL at 09:01

## 2017-01-29 RX ADMIN — GABAPENTIN 300 MG: 300 CAPSULE ORAL at 08:01

## 2017-01-29 RX ADMIN — HYDROMORPHONE HYDROCHLORIDE 0.5 MG: 1 INJECTION, SOLUTION INTRAMUSCULAR; INTRAVENOUS; SUBCUTANEOUS at 09:01

## 2017-01-29 RX ADMIN — RAMELTEON 8 MG: 8 TABLET, FILM COATED ORAL at 08:01

## 2017-01-29 RX ADMIN — HYDROMORPHONE HYDROCHLORIDE 0.5 MG: 1 INJECTION, SOLUTION INTRAMUSCULAR; INTRAVENOUS; SUBCUTANEOUS at 03:01

## 2017-01-29 RX ADMIN — AMLODIPINE BESYLATE 2.5 MG: 2.5 TABLET ORAL at 09:01

## 2017-01-29 RX ADMIN — CLINDAMYCIN IN 5 PERCENT DEXTROSE 600 MG: 12 INJECTION, SOLUTION INTRAVENOUS at 05:01

## 2017-01-29 RX ADMIN — KETOROLAC TROMETHAMINE 30 MG: 30 INJECTION, SOLUTION INTRAMUSCULAR at 01:01

## 2017-01-29 NOTE — PLAN OF CARE
Problem: Patient Care Overview  Goal: Plan of Care Review  Outcome: Ongoing (interventions implemented as appropriate)  Monitor SpO2, encourage periodic deep breathing.

## 2017-01-29 NOTE — PLAN OF CARE
Problem: Patient Care Overview  Goal: Plan of Care Review  Outcome: Ongoing (interventions implemented as appropriate)  Pts safety maintained, Clinimix infusing, meds given per order. Pt tolerating being clamped for 30 minutes after PO meds. NG tube to low continuous suction, container on the floor, draining large amount of fluid, clear green with dark sediment/fecal content. Pain relieved with PRN Toradol. Vitals stable, no complains of N/V, SOB, or distress.

## 2017-01-29 NOTE — PROGRESS NOTES
[facility]  Gastroenterology  Progress Note    Patient Name: Camilo Johnson  MRN: [id number]  Admission Date: 1/26/2017  Hospital Length of Stay: 2 days  Code Status: Full Code   Attending Provider: Salma Schwab MD  Consulting Provider: Gertrude Espinoza MD  Primary Care Physician: Latoya Alarcon MD  Principal Problem: Large bowel obstruction    Last Recorded LACE+ Scores     None          Subjective:     Interval History: No new complaints.    Review of Systems   Constitutional: Negative for appetite change.   HENT: Negative for trouble swallowing.    Eyes: Negative for photophobia.   Respiratory: Negative for cough and shortness of breath.    Cardiovascular: Negative for palpitations.   Gastrointestinal: Positive for abdominal pain.        See HPI for details   Genitourinary: Negative for frequency and hematuria.   Skin: Negative for rash.   Neurological: Negative for weakness and headaches.   Psychiatric/Behavioral: Negative for behavioral problems and suicidal ideas.     Objective:     Vital Signs (Most Recent):  Temp: 98.4 °F (36.9 °C) (01/28/17 2018)  Pulse: 78 (01/28/17 2018)  Resp: 20 (01/28/17 2018)  BP: (!) 156/77 (01/28/17 2018)  SpO2: 98 % (01/28/17 2024) Vital Signs (24h Range):  Temp:  [96.8 °F (36 °C)-98.4 °F (36.9 °C)] 98.4 °F (36.9 °C)  Pulse:  [64-89] 78  Resp:  [17-24] 20  SpO2:  [97 %-99 %] 98 %  BP: (139-179)/(73-87) 156/77     Weight: 71.6 kg (157 lb 12.8 oz) (01/28/17 0500)  Body mass index is 27.09 kg/(m^2).      Intake/Output Summary (Last 24 hours) at 01/28/17 2200  Last data filed at 01/28/17 1830   Gross per 24 hour   Intake           623.33 ml   Output             2050 ml   Net         -1426.67 ml       Lines/Drains/Airways     Central Venous Catheter Line                 Port A Cath Single Lumen left subclavian -- days         Port A Cath Single Lumen 04/01/16 0821 left subclavian 302 days         Port A Cath Single Lumen 01/28/17 1330 left subclavian  less than 1 day          Drain                 NG/OG Tube 01/26/17 1510 16 Fr. Right nostril 2 days          Peripheral Intravenous Line                 Peripheral IV - Single Lumen 01/26/17 1034 Right Antecubital 2 days                Physical Exam   Eyes: Pupils are equal, round, and reactive to light.   Neck: No thyromegaly present.   Cardiovascular: Normal rate, regular rhythm and normal heart sounds.    Pulmonary/Chest: Effort normal and breath sounds normal.   Abdominal: Soft. She exhibits no mass. There is tenderness. There is no rebound and no guarding.   Musculoskeletal: She exhibits no tenderness.   Psychiatric: Her behavior is normal. Thought content normal.         Assessment/Plan:     * Large bowel obstruction  Colonic stent placement on Monday    Bowel obstruction  Possibly secondary to external compression from the pancreatic tail. Need to r/o intrinsic mass, no prior colonosocpy. Continue NGT suction.    Pancreatic cancer metastasized to liver  Chemo on hold now.  Resume post colon decompession.      VTE Risk Mitigation         Ordered     enoxaparin injection 40 mg  Daily     Route:  Subcutaneous        01/26/17 1659     High Risk of VTE  Once      01/26/17 1659          Thank you for your consult. I will follow-up with patient. Please contact us if you have any additional questions.    Gertrude Espinoza MD  Gastroenterology  [facility]

## 2017-01-29 NOTE — PLAN OF CARE
Problem: Patient Care Overview  Goal: Plan of Care Review  Outcome: Ongoing (interventions implemented as appropriate)  Room air 98%

## 2017-01-29 NOTE — PLAN OF CARE
Problem: Patient Care Overview  Goal: Plan of Care Review  Outcome: Ongoing (interventions implemented as appropriate)  Pt is AAOx4 with complaints of abd pain with relief from dilaudid PO and toradol IV. Pt with NG tube to low cont wall suction put out total of 1050ml today. Pt receiving IV antibx and TPN. Pts port accessed today per RN. Pt ambulated in carlos today with assistance from . Pt states that she has been passing gas today.

## 2017-01-29 NOTE — ASSESSMENT & PLAN NOTE
Possibly secondary to external compression from the pancreatic tail. Need to r/o intrinsic mass, no prior colonosocpy. Continue NGT suction.

## 2017-01-29 NOTE — PROGRESS NOTES
Progress Note  General Surgery    Admit Date: 1/26/2017  Post-operative Day:    Hospital Day: 4    SUBJECTIVE:     NAEO. Patient requesting food today. Feeling much better. Pain improved. Still high NGT output with dark green appearance. Ambulating in room well. Voiding well. States she passed small amounts of stool and flatus this morning. PPN started yesterday for nutrition.    Scheduled Meds:   amlodipine  2.5 mg Oral Daily    clindamycin 600 MG/50 ML D5W  600 mg Intravenous Q8H    enoxaparin  40 mg Subcutaneous Daily    gabapentin  300 mg Oral BID     Continuous Infusions:   Amino acid 2.75% - dextrose 10% (CLINIMIX-E) solution with additives ( 1L provides 27.5 gm AA, 100 gm CHO (340 kcal/L dextrose), Na 35, K 30, Mg 5, Ca 4.5, Acetate 51, Cl 39, Phos 15) 100 mL/hr at 01/28/17 1346    Amino acid 2.75% - dextrose 10% (CLINIMIX-E) solution with additives ( 1L provides 27.5 gm AA, 100 gm CHO (340 kcal/L dextrose), Na 35, K 30, Mg 5, Ca 4.5, Acetate 51, Cl 39, Phos 15)       PRN Meds:bisacodyl, diphenhydrAMINE, HYDROmorphone, HYDROmorphone, ketorolac, ondansetron, promethazine (PHENERGAN) IVPB, ramelteon    Review of patient's allergies indicates:  No Known Allergies      OBJECTIVE:     Vital Signs (Most Recent)  Temp: 97.8 °F (36.6 °C) (01/29/17 0800)  Pulse: 67 (01/29/17 0800)  Resp: 16 (01/29/17 0800)  BP: (!) 147/91 (01/29/17 0800)  SpO2: 98 % (01/29/17 0851)    Vital Signs Range (Last 24H):  Temp:  [97.6 °F (36.4 °C)-99.3 °F (37.4 °C)]   Pulse:  [64-78]   Resp:  [16-24]   BP: (147-156)/(77-91)   SpO2:  [96 %-99 %]     I & O (Last 24H):    Intake/Output Summary (Last 24 hours) at 01/29/17 0926  Last data filed at 01/29/17 0600   Gross per 24 hour   Intake          2173.33 ml   Output             4125 ml   Net         -1951.67 ml       NGT: 3250 cc of dark green brown fluid    Physical Exam:  NAD, resting comfortably  NGT in place  Non labored breathing  RRR   abd soft, mildly distended but decreased, nt      Laboratory:  CBC: pending  CMP: pending    Diagnostic Results:  None    ASSESSMENT/PLAN:     Camilo Johnson is a 53 yo F with Pancreatic CA admitted for Large Bowel Obstruction  - cont NPO/NGT  - f/u labs  - start PPN  - OOB/ambulate/IS  - Colonic stent tomorrow    Tony Worrell MD   PGY-2

## 2017-01-30 ENCOUNTER — TELEPHONE (OUTPATIENT)
Dept: ADMINISTRATIVE | Facility: OTHER | Age: 52
End: 2017-01-30

## 2017-01-30 ENCOUNTER — ANESTHESIA (OUTPATIENT)
Dept: ENDOSCOPY | Facility: HOSPITAL | Age: 52
DRG: 436 | End: 2017-01-30
Payer: COMMERCIAL

## 2017-01-30 ENCOUNTER — ANESTHESIA EVENT (OUTPATIENT)
Dept: ENDOSCOPY | Facility: HOSPITAL | Age: 52
DRG: 436 | End: 2017-01-30
Payer: COMMERCIAL

## 2017-01-30 LAB
ALBUMIN SERPL BCP-MCNC: 3.9 G/DL
ALP SERPL-CCNC: 68 U/L
ALT SERPL W/O P-5'-P-CCNC: 25 U/L
ANION GAP SERPL CALC-SCNC: 10 MMOL/L
AST SERPL-CCNC: 32 U/L
BASOPHILS # BLD AUTO: 0.07 K/UL
BASOPHILS NFR BLD: 1 %
BILIRUB SERPL-MCNC: 0.6 MG/DL
BUN SERPL-MCNC: 11 MG/DL
CALCIUM SERPL-MCNC: 9.9 MG/DL
CHLORIDE SERPL-SCNC: 97 MMOL/L
CO2 SERPL-SCNC: 33 MMOL/L
CREAT SERPL-MCNC: 0.7 MG/DL
DIFFERENTIAL METHOD: ABNORMAL
EOSINOPHIL # BLD AUTO: 0.1 K/UL
EOSINOPHIL NFR BLD: 1.3 %
ERYTHROCYTE [DISTWIDTH] IN BLOOD BY AUTOMATED COUNT: 17.5 %
EST. GFR  (AFRICAN AMERICAN): >60 ML/MIN/1.73 M^2
EST. GFR  (NON AFRICAN AMERICAN): >60 ML/MIN/1.73 M^2
GLUCOSE SERPL-MCNC: 137 MG/DL
HCT VFR BLD AUTO: 38.8 %
HGB BLD-MCNC: 12.7 G/DL
LYMPHOCYTES # BLD AUTO: 1.6 K/UL
LYMPHOCYTES NFR BLD: 22.5 %
MCH RBC QN AUTO: 28.3 PG
MCHC RBC AUTO-ENTMCNC: 32.7 %
MCV RBC AUTO: 87 FL
MONOCYTES # BLD AUTO: 1.4 K/UL
MONOCYTES NFR BLD: 19.3 %
NEUTROPHILS # BLD AUTO: 3.9 K/UL
NEUTROPHILS NFR BLD: 55.8 %
PLATELET # BLD AUTO: 509 K/UL
PMV BLD AUTO: 9.3 FL
POTASSIUM SERPL-SCNC: 3.6 MMOL/L
PROT SERPL-MCNC: 7.6 G/DL
RBC # BLD AUTO: 4.48 M/UL
SODIUM SERPL-SCNC: 140 MMOL/L
WBC # BLD AUTO: 7.01 K/UL

## 2017-01-30 PROCEDURE — 25000003 PHARM REV CODE 250: Performed by: STUDENT IN AN ORGANIZED HEALTH CARE EDUCATION/TRAINING PROGRAM

## 2017-01-30 PROCEDURE — 0D7K8DZ DILATION OF ASCENDING COLON WITH INTRALUMINAL DEVICE, VIA NATURAL OR ARTIFICIAL OPENING ENDOSCOPIC: ICD-10-PCS | Performed by: INTERNAL MEDICINE

## 2017-01-30 PROCEDURE — 80053 COMPREHEN METABOLIC PANEL: CPT

## 2017-01-30 PROCEDURE — 25000003 PHARM REV CODE 250: Performed by: SURGERY

## 2017-01-30 PROCEDURE — 93005 ELECTROCARDIOGRAM TRACING: CPT

## 2017-01-30 PROCEDURE — 97802 MEDICAL NUTRITION INDIV IN: CPT

## 2017-01-30 PROCEDURE — 63600175 PHARM REV CODE 636 W HCPCS: Performed by: STUDENT IN AN ORGANIZED HEALTH CARE EDUCATION/TRAINING PROGRAM

## 2017-01-30 PROCEDURE — 85025 COMPLETE CBC W/AUTO DIFF WBC: CPT

## 2017-01-30 PROCEDURE — C1876 STENT, NON-COA/NON-COV W/DEL: HCPCS | Performed by: INTERNAL MEDICINE

## 2017-01-30 PROCEDURE — 36415 COLL VENOUS BLD VENIPUNCTURE: CPT

## 2017-01-30 PROCEDURE — 94761 N-INVAS EAR/PLS OXIMETRY MLT: CPT

## 2017-01-30 PROCEDURE — 37000009 HC ANESTHESIA EA ADD 15 MINS: Performed by: INTERNAL MEDICINE

## 2017-01-30 PROCEDURE — 11000001 HC ACUTE MED/SURG PRIVATE ROOM

## 2017-01-30 PROCEDURE — 93010 ELECTROCARDIOGRAM REPORT: CPT | Mod: ,,, | Performed by: INTERNAL MEDICINE

## 2017-01-30 PROCEDURE — 25000003 PHARM REV CODE 250: Performed by: NURSE ANESTHETIST, CERTIFIED REGISTERED

## 2017-01-30 PROCEDURE — 37000008 HC ANESTHESIA 1ST 15 MINUTES: Performed by: INTERNAL MEDICINE

## 2017-01-30 PROCEDURE — 63600175 PHARM REV CODE 636 W HCPCS: Performed by: NURSE ANESTHETIST, CERTIFIED REGISTERED

## 2017-01-30 DEVICE — IMPLANTABLE DEVICE: Type: IMPLANTABLE DEVICE | Site: OTHER (ADD COMMENT) | Status: FUNCTIONAL

## 2017-01-30 RX ORDER — MIDAZOLAM HYDROCHLORIDE 1 MG/ML
INJECTION, SOLUTION INTRAMUSCULAR; INTRAVENOUS
Status: DISCONTINUED | OUTPATIENT
Start: 2017-01-30 | End: 2017-01-30

## 2017-01-30 RX ORDER — SODIUM CHLORIDE, SODIUM LACTATE, POTASSIUM CHLORIDE, CALCIUM CHLORIDE 600; 310; 30; 20 MG/100ML; MG/100ML; MG/100ML; MG/100ML
125 INJECTION, SOLUTION INTRAVENOUS CONTINUOUS
Status: ACTIVE | OUTPATIENT
Start: 2017-01-30 | End: 2017-01-30

## 2017-01-30 RX ORDER — SODIUM CHLORIDE 0.9 % (FLUSH) 0.9 %
3 SYRINGE (ML) INJECTION
Status: DISCONTINUED | OUTPATIENT
Start: 2017-01-30 | End: 2017-01-31 | Stop reason: HOSPADM

## 2017-01-30 RX ORDER — HYDROMORPHONE HYDROCHLORIDE 2 MG/ML
0.4 INJECTION, SOLUTION INTRAMUSCULAR; INTRAVENOUS; SUBCUTANEOUS EVERY 5 MIN PRN
Status: DISCONTINUED | OUTPATIENT
Start: 2017-01-30 | End: 2017-01-31 | Stop reason: HOSPADM

## 2017-01-30 RX ORDER — PHENYLEPHRINE HYDROCHLORIDE 10 MG/ML
INJECTION INTRAVENOUS
Status: DISCONTINUED | OUTPATIENT
Start: 2017-01-30 | End: 2017-01-30

## 2017-01-30 RX ORDER — ONDANSETRON 2 MG/ML
INJECTION INTRAMUSCULAR; INTRAVENOUS
Status: DISCONTINUED | OUTPATIENT
Start: 2017-01-30 | End: 2017-01-30

## 2017-01-30 RX ORDER — ONDANSETRON 2 MG/ML
4 INJECTION INTRAMUSCULAR; INTRAVENOUS DAILY PRN
Status: DISCONTINUED | OUTPATIENT
Start: 2017-01-30 | End: 2017-01-31 | Stop reason: HOSPADM

## 2017-01-30 RX ORDER — SUCCINYLCHOLINE CHLORIDE 20 MG/ML
INJECTION INTRAMUSCULAR; INTRAVENOUS
Status: DISCONTINUED | OUTPATIENT
Start: 2017-01-30 | End: 2017-01-30

## 2017-01-30 RX ORDER — LIDOCAINE HCL/PF 100 MG/5ML
SYRINGE (ML) INTRAVENOUS
Status: DISCONTINUED | OUTPATIENT
Start: 2017-01-30 | End: 2017-01-30

## 2017-01-30 RX ORDER — FENTANYL CITRATE 50 UG/ML
INJECTION, SOLUTION INTRAMUSCULAR; INTRAVENOUS
Status: DISCONTINUED | OUTPATIENT
Start: 2017-01-30 | End: 2017-01-30

## 2017-01-30 RX ORDER — ROCURONIUM BROMIDE 10 MG/ML
INJECTION, SOLUTION INTRAVENOUS
Status: DISCONTINUED | OUTPATIENT
Start: 2017-01-30 | End: 2017-01-30

## 2017-01-30 RX ORDER — ACETAMINOPHEN 10 MG/ML
1000 INJECTION, SOLUTION INTRAVENOUS EVERY 8 HOURS
Status: COMPLETED | OUTPATIENT
Start: 2017-01-30 | End: 2017-01-31

## 2017-01-30 RX ORDER — PROPOFOL 10 MG/ML
VIAL (ML) INTRAVENOUS
Status: DISCONTINUED | OUTPATIENT
Start: 2017-01-30 | End: 2017-01-30

## 2017-01-30 RX ORDER — HYDROMORPHONE HYDROCHLORIDE 2 MG/ML
0.2 INJECTION, SOLUTION INTRAMUSCULAR; INTRAVENOUS; SUBCUTANEOUS EVERY 5 MIN PRN
Status: DISCONTINUED | OUTPATIENT
Start: 2017-01-30 | End: 2017-01-31 | Stop reason: HOSPADM

## 2017-01-30 RX ORDER — SODIUM CHLORIDE 9 MG/ML
INJECTION, SOLUTION INTRAVENOUS CONTINUOUS PRN
Status: DISCONTINUED | OUTPATIENT
Start: 2017-01-30 | End: 2017-01-30

## 2017-01-30 RX ORDER — KETOROLAC TROMETHAMINE 30 MG/ML
15 INJECTION, SOLUTION INTRAMUSCULAR; INTRAVENOUS EVERY 6 HOURS PRN
Status: DISCONTINUED | OUTPATIENT
Start: 2017-01-30 | End: 2017-01-31 | Stop reason: HOSPADM

## 2017-01-30 RX ADMIN — DIPHENHYDRAMINE HYDROCHLORIDE 25 MG: 50 INJECTION, SOLUTION INTRAMUSCULAR; INTRAVENOUS at 12:01

## 2017-01-30 RX ADMIN — HYDROMORPHONE HYDROCHLORIDE 0.5 MG: 1 INJECTION, SOLUTION INTRAMUSCULAR; INTRAVENOUS; SUBCUTANEOUS at 11:01

## 2017-01-30 RX ADMIN — ASCORBIC ACID, VITAMIN A PALMITATE, CHOLECALCIFEROL, THIAMINE HYDROCHLORIDE, RIBOFLAVIN-5 PHOSPHATE SODIUM, PYRIDOXINE HYDROCHLORIDE, NIACINAMIDE, DEXPANTHENOL, ALPHA-TOCOPHEROL ACETATE, VITAMIN K1, FOLIC ACID, BIOTIN, CYANOCOBALAMIN: 200; 3300; 200; 6; 3.6; 6; 40; 15; 10; 150; 600; 60; 5 INJECTION, SOLUTION INTRAVENOUS at 11:01

## 2017-01-30 RX ADMIN — ACETAMINOPHEN 1000 MG: 10 INJECTION, SOLUTION INTRAVENOUS at 10:01

## 2017-01-30 RX ADMIN — CLINDAMYCIN IN 5 PERCENT DEXTROSE 600 MG: 12 INJECTION, SOLUTION INTRAVENOUS at 12:01

## 2017-01-30 RX ADMIN — HYDROMORPHONE HYDROCHLORIDE 4 MG: 2 TABLET ORAL at 09:01

## 2017-01-30 RX ADMIN — PHENYLEPHRINE HYDROCHLORIDE 200 MCG: 10 INJECTION INTRAVENOUS at 01:01

## 2017-01-30 RX ADMIN — KETOROLAC TROMETHAMINE 15 MG: 30 INJECTION, SOLUTION INTRAMUSCULAR at 06:01

## 2017-01-30 RX ADMIN — ONDANSETRON 8 MG: 2 INJECTION, SOLUTION INTRAMUSCULAR; INTRAVENOUS at 01:01

## 2017-01-30 RX ADMIN — GABAPENTIN 300 MG: 300 CAPSULE ORAL at 08:01

## 2017-01-30 RX ADMIN — ACETAMINOPHEN 1000 MG: 10 INJECTION, SOLUTION INTRAVENOUS at 05:01

## 2017-01-30 RX ADMIN — PHENYLEPHRINE HYDROCHLORIDE 100 MCG: 10 INJECTION INTRAVENOUS at 02:01

## 2017-01-30 RX ADMIN — SODIUM CHLORIDE: 0.9 INJECTION, SOLUTION INTRAVENOUS at 01:01

## 2017-01-30 RX ADMIN — SUCCINYLCHOLINE CHLORIDE 120 MG: 20 INJECTION, SOLUTION INTRAMUSCULAR; INTRAVENOUS at 01:01

## 2017-01-30 RX ADMIN — AMLODIPINE BESYLATE 2.5 MG: 2.5 TABLET ORAL at 08:01

## 2017-01-30 RX ADMIN — CLINDAMYCIN IN 5 PERCENT DEXTROSE 600 MG: 12 INJECTION, SOLUTION INTRAVENOUS at 05:01

## 2017-01-30 RX ADMIN — MIDAZOLAM 2 MG: 1 INJECTION INTRAMUSCULAR; INTRAVENOUS at 01:01

## 2017-01-30 RX ADMIN — SODIUM PHOSPHATE, DIBASIC AND SODIUM PHOSPHATE, MONOBASIC 1 ENEMA: 7; 19 ENEMA RECTAL at 08:01

## 2017-01-30 RX ADMIN — LIDOCAINE HYDROCHLORIDE 100 MG: 20 INJECTION, SOLUTION INTRAVENOUS at 01:01

## 2017-01-30 RX ADMIN — FENTANYL CITRATE 100 MCG: 50 INJECTION, SOLUTION INTRAMUSCULAR; INTRAVENOUS at 01:01

## 2017-01-30 RX ADMIN — ROCURONIUM BROMIDE 5 MG: 10 INJECTION, SOLUTION INTRAVENOUS at 01:01

## 2017-01-30 RX ADMIN — GABAPENTIN 300 MG: 300 CAPSULE ORAL at 10:01

## 2017-01-30 RX ADMIN — PROPOFOL 130 MG: 10 INJECTION, EMULSION INTRAVENOUS at 01:01

## 2017-01-30 RX ADMIN — HYDROMORPHONE HYDROCHLORIDE 0.5 MG: 1 INJECTION, SOLUTION INTRAMUSCULAR; INTRAVENOUS; SUBCUTANEOUS at 03:01

## 2017-01-30 RX ADMIN — CLINDAMYCIN IN 5 PERCENT DEXTROSE 600 MG: 12 INJECTION, SOLUTION INTRAVENOUS at 08:01

## 2017-01-30 NOTE — PLAN OF CARE
"Pt currently complains of excruciating abdominal pain. With pain a 10 out of 10. Pt states that "None of the pain medicine is working." Dr. Worrell notified. 15 mg IV Toradol ordered.  "

## 2017-01-30 NOTE — ANESTHESIA RELEASE NOTE
"Anesthesia Release from PACU Note    Patient: Camilo Johnson    Procedure(s) Performed: Procedure(s) (LRB):  COLONOSCOPY (N/A)    Anesthesia type: MAC    Post pain: Adequate analgesia    Post assessment: no apparent anesthetic complications, tolerated procedure well and no evidence of recall    Last Vitals:   Visit Vitals    /84    Pulse 92    Temp 36.5 °C (97.7 °F) (Axillary)    Resp 14    Ht 5' 4" (1.626 m)    Wt 72.1 kg (159 lb)    SpO2 98%    Breastfeeding No    BMI 27.29 kg/m2       Post vital signs: stable    Level of consciousness: awake, alert  and oriented    Nausea/Vomiting: no nausea/no vomiting    Complications: none    Airway Patency: patent    Respiratory: unassisted    Cardiovascular: stable and blood pressure at baseline    Hydration: euvolemic  "

## 2017-01-30 NOTE — PROGRESS NOTES
[facility]  Adult Nutrition  Consult Note    SUMMARY     Recommendations    Recommendation/Intervention:   1. Continue current TPN for now.   2. Initiate Clear Liquid diewt when medically acceptable.     Goals:   Pt will tolerate TPN  Nutrition Goal Status: new  Communication of RD Recs: reviewed with RN (Mikey)    Continuum of Care Plan  Referral to Outpatient Services:  (d/c needs to be determined)    Reason for Assessment  Reason for Assessment: new TPN  Relevent Medical History: 51 yo female admitted with ileus. PMH+ for HTN, SOB, pancreatic CA.     General Information Comments: Pt NPO. Pt receiving Clinimix 2.75/10 at 100ml/hr. NG tube to suction. Denies N/V.    Nutrition Prescription Ordered  Current Diet Order: NPO  Current Nutrition Support Formula Ordered:  (Clinimix 2.75/10)  Current Nutrition Support Rate Ordered: 100 (ml)  Current Nutrition Support Frequency Ordered: ml/hr      Evaluation of Received Nutrients/Fluid Intake  Parenteral Calories (kcal): 1080  Parenteral Protein (gm): 66  Parenteral Fluid (mL): 2400  % Kcal Needs: 54-58  % Protein Needs: 77-92  Tolerance:  (GIR 2.3)     Nutrition Risk Screen  Nutrition Risk Screen: no indicators present    Nutrition/Diet History  Patient Reported Diet/Restrictions/Preferences: general  Food Preferences: no Zoroastrianism or cutlural food prefs identified  Factors Affecting Nutritional Intake: altered gastrointestinal function, NPO    Labs/Tests/Procedures/Meds  Pertinent Labs Reviewed: reviewed  Pertinent Labs Comments: Glu 137H, PAB 16L  Pertinent Medications Reviewed: reviewed  Pertinent Medications Comments: tylenol    Physical Findings  Overall Physical Appearance:  (WNL)  Tubes:  (none)  Oral/Mouth Cavity: WDL  Skin:  (Iker 21-intact)    Anthropometrics  Height (inches): 64.02 in  Weight Method: Stated  Weight (kg): 72.1 kg  Ideal Body Weight (IBW), Female: 120.1 lb  % Ideal Body Weight, Female (lb): 132.35 lb  BMI (kg/m2): 27.27  BMI  Grade: 25 - 29.9 - overweight     Estimated/Assessed Needs  Weight Used For Calorie Calculations: 72.1 kg (158 lb 15.2 oz)   Height (cm): 162.6 cm  Energy Need Method: Newhall-St Jeor (3293-3983 (MSJ x 1.4-1.5))  RMR (Newhall-St. Jeor Equation): 1329.53  Weight Used For Protein Calculations: 72.1 kg (158 lb 15.2 oz)  Protein Requirements: 72-86g (1.0-1.2 g/kg)    Malnutrition (Undernutrition) Diagnosis  % Intake of Estimated Energy Needs: 50 - 75% (TPN)  % Meal Intake: NPO     Nutrition Diagnosis  Nutrition Problem: Altered GI function  Etiology/Related To: ileus  Nutrition Diagnosis Signs/Symptoms As Evidenced By: pt NPO on TPN  Nutrition Diagnosis Status: New    Monitor and Evaluation  Food and Nutrient Intake: parenteral nutrition intake  Food and Nutrient Adminstration: diet order, enteral and parenteral nutrition administration  Physical Activity and Function: nutrition-related ADLs and IADLs  Anthropometric Measurements: weight  Biochemical Data, Medical Tests and Procedures: electrolyte and renal panel, gastrointestinal profile, glucose/endocrine profile  Nutrition-Focused Physical Findings: overall appearance    Nutrition Risk  Level of Risk: high    Nutrition Follow-Up  RD Follow-up?: Yes

## 2017-01-30 NOTE — TELEPHONE ENCOUNTER
----- Message from Sebas Zaman DO, FACBETH sent at 1/23/2017  5:26 PM CST -----  Stat lower extremity ultrasound and CT today  Refer to dermatology  Resume chemotherapy in one week  Follow-up with me in 2 weeks

## 2017-01-30 NOTE — TRANSFER OF CARE
"Anesthesia Transfer of Care Note    Patient: Camilo Johnson    Procedure(s) Performed: Procedure(s) (LRB):  COLONOSCOPY (N/A)    Patient location: PACU    Anesthesia Type: general    Transport from OR: Transported from OR on 6-10 L/min O2 by face mask with adequate spontaneous ventilation    Post pain: adequate analgesia    Post assessment: no apparent anesthetic complications and tolerated procedure well    Post vital signs: stable    Level of consciousness: awake, alert and oriented    Nausea/Vomiting: no nausea/vomiting    Complications: none          Last vitals:   Visit Vitals    /80    Pulse 85    Temp 36.8 °C (98.2 °F) (Oral)    Resp 15    Ht 5' 4" (1.626 m)    Wt 72.1 kg (159 lb)    SpO2 99%    Breastfeeding No    BMI 27.29 kg/m2     "

## 2017-01-30 NOTE — PLAN OF CARE
Problem: Patient Care Overview  Goal: Plan of Care Review  Outcome: Ongoing (interventions implemented as appropriate)  Room air 97%

## 2017-01-30 NOTE — PROGRESS NOTES
Progress Note  General Surgery    Admit Date: 1/26/2017  Post-operative Day:    Hospital Day: 5    SUBJECTIVE:     Pain overnight.  Improved this am.  Currently in no distress.  Denies n/v.     Scheduled Meds:   acetaminophen  1,000 mg Intravenous Q8H    amlodipine  2.5 mg Oral Daily    clindamycin 600 MG/50 ML D5W  600 mg Intravenous Q8H    enoxaparin  40 mg Subcutaneous Daily    gabapentin  300 mg Oral BID     Continuous Infusions:   Amino acid 2.75% - dextrose 10% (CLINIMIX-E) solution with additives ( 1L provides 27.5 gm AA, 100 gm CHO (340 kcal/L dextrose), Na 35, K 30, Mg 5, Ca 4.5, Acetate 51, Cl 39, Phos 15) 100 mL/hr at 01/29/17 0935    Amino acid 2.75% - dextrose 10% (CLINIMIX-E) solution with additives ( 1L provides 27.5 gm AA, 100 gm CHO (340 kcal/L dextrose), Na 35, K 30, Mg 5, Ca 4.5, Acetate 51, Cl 39, Phos 15)       PRN Meds:bisacodyl, diphenhydrAMINE, HYDROmorphone, HYDROmorphone, ketorolac, ondansetron, promethazine (PHENERGAN) IVPB, ramelteon    Review of patient's allergies indicates:  No Known Allergies      OBJECTIVE:     Vital Signs (Most Recent)  Temp: 97.2 °F (36.2 °C) (01/30/17 0450)  Pulse: 80 (01/30/17 0450)  Resp: 18 (01/30/17 0450)  BP: 130/76 (01/30/17 0450)  SpO2: 97 % (01/30/17 0357)    Vital Signs Range (Last 24H):  Temp:  [97.2 °F (36.2 °C)-98.3 °F (36.8 °C)]   Pulse:  [67-80]   Resp:  [16-18]   BP: (130-147)/(76-91)   SpO2:  [96 %-98 %]     I & O (Last 24H):    Intake/Output Summary (Last 24 hours) at 01/30/17 0741  Last data filed at 01/30/17 0600   Gross per 24 hour   Intake                0 ml   Output             3701 ml   Net            -3701 ml       NGT: 3250 cc of dark green brown fluid    Physical Exam:  NAD, resting comfortably  NGT in place  Non labored breathing  RRR   abd soft, mildly distended but decreased, nt     Laboratory:  WBC 7  H/H 12.7/38.8  Plt 509  CMP WNL    Diagnostic Results:  None    ASSESSMENT/PLAN:     Camilo Johnson is a 53 yo F with Pancreatic  CA admitted for Large Bowel Obstruction  - cont NPO/NGT  - cont PPN  - OOB/ambulate/IS  - Colonic stent    Brooks Ordaz MD

## 2017-01-30 NOTE — ANESTHESIA PREPROCEDURE EVALUATION
01/30/2017  Camilo Johnson is a 52 y.o., female with pancreatic CA mets to liver and bowel obstruction for colonoscopy. Pt with NGT in place and copious output per RN. Receiving fleets and soap suds enemas this morning.     [facility] Anesthesia Evaluation     I have reviewed the Nursing Notes.   I have reviewed the Medications.     Review of Systems  Anesthesia Hx:   Denies Personal Hx of Anesthesia complications.   Social:  Alcohol Use, Non-Smoker   Hematology/Oncology:  Hematology Normal      Hematology Comments: Sickle cell trait Current/Recent Cancer. chemotherapy  Oncology Comments: Pancreatic cancer     Cardiovascular:   Hypertension ECG has been reviewed.    Pulmonary:   Shortness of breath pulm nodules and small pleural effusion on CT 1/26/17   Hepatic/GI:   Liver Disease, (pancreas CA mets to liver) Bowel obstruction/NG tube in place   OB/GYN/PEDS:  postmenopausal   Neurological:   Headaches    Psych:   Psychiatric History anxiety depression          Physical Exam  General:  Cachexia    Airway/Jaw/Neck:  Airway Findings: Mouth Opening: Small, but > 3cm General Airway Assessment: Adult  Mallampati: III  TM Distance: Normal, at least 6 cm      Dental:  Dental Findings: Periodontal disease, Mild   Chest/Lungs:  Chest/Lungs Clear    Heart/Vascular:  Heart Findings: Normal       Mental Status:  Mental Status Findings:  Alert and Oriented, Somnolent       Lab Results   Component Value Date    WBC 7.01 01/30/2017    HGB 12.7 01/30/2017    HCT 38.8 01/30/2017     (H) 01/30/2017    CHOL 187 12/30/2013    TRIG 49 12/30/2013    HDL 47 12/30/2013    ALT 25 01/30/2017    AST 32 01/30/2017     01/30/2017    K 3.6 01/30/2017    CL 97 01/30/2017    CREATININE 0.7 01/30/2017    BUN 11 01/30/2017    CO2 33 (H) 01/30/2017    TSH 1.440 01/25/2016    INR 1.1 01/26/2017    HGBA1C 5.8 03/18/2016     CT SCAN  Dilation  of the small bowel throughout and significant distention of the proximal colon and transverse colon.  Abrupt change in caliber of the colon at the splenic flexure concerning for bowel obstruction, and obstructive process is not definitely identified although it is abutting the pancreatic tail mass and there could be some fibrotic type changes at the colonic splenic flexure, direct invasion or reactive inflammatory changes of the adjacent bowel or.    Multiple tiny pulmonary nodules concerning for metastases or infection, unchanged.  Small right pleural effusion.  Sclerotic osseous lesions concerning for metastases.        Electronically signed by: ABRAHAM ALVARES MD  Date: 01/26/17  Time: 16:09       Anesthesia Plan  Type of Anesthesia, risks & benefits discussed:  Anesthesia Type:  MAC, general  Patient's Preference:   Intra-op Monitoring Plan:   Intra-op Monitoring Plan Comments:   Post Op Pain Control Plan:   Post Op Pain Control Plan Comments:   Induction:    Beta Blocker:  Patient is not currently on a Beta-Blocker (No further documentation required).       Informed Consent: Patient understands risks and agrees with Anesthesia plan.  Questions answered. Anesthesia consent signed with patient.  ASA Score: 3     Day of Surgery Review of History & Physical:        Anesthesia Plan Notes: EKG ordered        Ready For Surgery From Anesthesia Perspective.

## 2017-01-30 NOTE — SUBJECTIVE & OBJECTIVE
Subjective:     Interval History: Appears more lethergic today, possibly secondary to pain medication. No vomiting.    Review of Systems   Constitutional: Negative for appetite change.   HENT: Negative for trouble swallowing.    Eyes: Negative for photophobia.   Respiratory: Negative for cough and shortness of breath.    Cardiovascular: Negative for palpitations.   Gastrointestinal:        See HPI for details   Genitourinary: Negative for frequency and hematuria.   Skin: Negative for rash.   Neurological: Negative for weakness and headaches.   Psychiatric/Behavioral: Negative for behavioral problems and suicidal ideas.     Objective:     Vital Signs (Most Recent):  Temp: 97.8 °F (36.6 °C) (01/29/17 1600)  Pulse: 75 (01/29/17 1600)  Resp: 16 (01/29/17 1600)  BP: (!) 146/88 (01/29/17 1600)  SpO2: 97 % (01/29/17 1832) Vital Signs (24h Range):  Temp:  [97.8 °F (36.6 °C)-99.3 °F (37.4 °C)] 97.8 °F (36.6 °C)  Pulse:  [67-78] 75  Resp:  [16-20] 16  SpO2:  [96 %-99 %] 97 %  BP: (146-156)/(77-91) 146/88     Weight: 72.4 kg (159 lb 11.2 oz) (01/29/17 0430)  Body mass index is 27.41 kg/(m^2).      Intake/Output Summary (Last 24 hours) at 01/29/17 1911  Last data filed at 01/29/17 1745   Gross per 24 hour   Intake             1600 ml   Output             3475 ml   Net            -1875 ml       Lines/Drains/Airways     Central Venous Catheter Line                 Port A Cath Single Lumen left subclavian -- days         Port A Cath Single Lumen 04/01/16 0821 left subclavian 303 days         Port A Cath Single Lumen 01/28/17 1330 left subclavian 1 day          Drain                 NG/OG Tube 01/26/17 1510 16 Fr. Right nostril 3 days          Peripheral Intravenous Line                 Peripheral IV - Single Lumen 01/26/17 1034 Right Antecubital 3 days                Physical Exam   Constitutional: She is oriented to person, place, and time.   Lethargic   Eyes: Pupils are equal, round, and reactive to light.   Neck: No  thyromegaly present.   Cardiovascular: Normal rate, regular rhythm and normal heart sounds.    Pulmonary/Chest: Effort normal and breath sounds normal.   Abdominal: Soft. She exhibits no mass. There is no tenderness. There is no rebound and no guarding.   Musculoskeletal: She exhibits no tenderness.   Neurological: She is oriented to person, place, and time.   Psychiatric: Her behavior is normal. Thought content normal.

## 2017-01-30 NOTE — ANESTHESIA POSTPROCEDURE EVALUATION
"Anesthesia Post Evaluation    Patient: Camilo Johnson    Procedure(s) Performed: Procedure(s) (LRB):  COLONOSCOPY (N/A)    Final Anesthesia Type: MAC  Patient location during evaluation: PACU  Patient participation: Yes- Able to Participate  Level of consciousness: awake and alert  Post-procedure vital signs: reviewed and stable  Pain management: adequate  Airway patency: patent  PONV status at discharge: No PONV  Anesthetic complications: no      Cardiovascular status: hemodynamically stable  Respiratory status: unassisted  Hydration status: euvolemic  Follow-up not needed.        Visit Vitals    /84    Pulse 92    Temp 36.5 °C (97.7 °F) (Axillary)    Resp 14    Ht 5' 4" (1.626 m)    Wt 72.1 kg (159 lb)    SpO2 98%    Breastfeeding No    BMI 27.29 kg/m2       Pain/Angeli Score: Pain Assessment Performed: Yes (1/30/2017  3:24 PM)  Presence of Pain: denies (1/30/2017  3:24 PM)  Pain Rating Prior to Med Admin: 3 (1/30/2017 11:43 AM)  Angeli Score: 10 (1/30/2017  3:24 PM)  Modified Angeli Score: 20 (1/30/2017  3:24 PM)      "

## 2017-01-30 NOTE — PLAN OF CARE
Problem: Nutrition, Parenteral (Adult)  Goal: Signs and Symptoms of Listed Potential Problems Will be Absent, Minimized or Managed (Nutrition, Parenteral)  Signs and symptoms of listed potential problems will be absent, minimized or managed by discharge/transition of care (reference Nutrition, Parenteral (Adult) CPG).  Outcome: Ongoing (interventions implemented as appropriate)  Recommendation/Intervention:   1. Continue current TPN for now.   2. Initiate Clear Liquid diewt when medically acceptable.      Goals:   Pt will tolerate TPN  Nutrition Goal Status: new  Communication of RD Recs: reviewed with RN Sandeep)

## 2017-01-30 NOTE — PROGRESS NOTES
[facility]  Gastroenterology  Progress Note    Patient Name: Camilo Johnson  MRN: [id number]  Admission Date: 1/26/2017  Hospital Length of Stay: 3 days  Code Status: Full Code   Attending Provider: Salma Schwab MD  Consulting Provider: Gertrude Espinoza MD  Primary Care Physician: Latoya Alarcon MD  Principal Problem: Large bowel obstruction    Last Recorded LACE+ Scores     None          Subjective:     Interval History: Appears more lethergic today, possibly secondary to pain medication. No vomiting.    Review of Systems   Constitutional: Negative for appetite change.   HENT: Negative for trouble swallowing.    Eyes: Negative for photophobia.   Respiratory: Negative for cough and shortness of breath.    Cardiovascular: Negative for palpitations.   Gastrointestinal:        See HPI for details   Genitourinary: Negative for frequency and hematuria.   Skin: Negative for rash.   Neurological: Negative for weakness and headaches.   Psychiatric/Behavioral: Negative for behavioral problems and suicidal ideas.     Objective:     Vital Signs (Most Recent):  Temp: 97.8 °F (36.6 °C) (01/29/17 1600)  Pulse: 75 (01/29/17 1600)  Resp: 16 (01/29/17 1600)  BP: (!) 146/88 (01/29/17 1600)  SpO2: 97 % (01/29/17 1832) Vital Signs (24h Range):  Temp:  [97.8 °F (36.6 °C)-99.3 °F (37.4 °C)] 97.8 °F (36.6 °C)  Pulse:  [67-78] 75  Resp:  [16-20] 16  SpO2:  [96 %-99 %] 97 %  BP: (146-156)/(77-91) 146/88     Weight: 72.4 kg (159 lb 11.2 oz) (01/29/17 0430)  Body mass index is 27.41 kg/(m^2).      Intake/Output Summary (Last 24 hours) at 01/29/17 1911  Last data filed at 01/29/17 1745   Gross per 24 hour   Intake             1600 ml   Output             3475 ml   Net            -1875 ml       Lines/Drains/Airways     Central Venous Catheter Line                 Port A Cath Single Lumen left subclavian -- days         Port A Cath Single Lumen 04/01/16 0821 left subclavian 303 days         Port A Cath Single  Lumen 01/28/17 1330 left subclavian 1 day          Drain                 NG/OG Tube 01/26/17 1510 16 Fr. Right nostril 3 days          Peripheral Intravenous Line                 Peripheral IV - Single Lumen 01/26/17 1034 Right Antecubital 3 days                Physical Exam   Constitutional: She is oriented to person, place, and time.   Lethargic   Eyes: Pupils are equal, round, and reactive to light.   Neck: No thyromegaly present.   Cardiovascular: Normal rate, regular rhythm and normal heart sounds.    Pulmonary/Chest: Effort normal and breath sounds normal.   Abdominal: Soft. She exhibits no mass. There is no tenderness. There is no rebound and no guarding.   Musculoskeletal: She exhibits no tenderness.   Neurological: She is oriented to person, place, and time.   Psychiatric: Her behavior is normal. Thought content normal.         Assessment/Plan:     * Large bowel obstruction  Colonic stent placement on Monday    Bowel obstruction  Possibly secondary to external compression from the pancreatic tail. Need to r/o intrinsic mass, no prior colonosocpy. Continue NGT suction.      VTE Risk Mitigation         Ordered     enoxaparin injection 40 mg  Daily     Route:  Subcutaneous        01/26/17 1659     High Risk of VTE  Once      01/26/17 1659          Thank you for your consult. I will follow-up with patient. Please contact us if you have any additional questions.    Gertrude Espinoza MD  Gastroenterology  [facility]

## 2017-01-30 NOTE — PLAN OF CARE
Problem: Patient Care Overview  Goal: Plan of Care Review  Outcome: Ongoing (interventions implemented as appropriate)  Pt AA&O x 4.Frequenlty complains of abdominal pain. PRN pain meds given as ordered with mild relief obtained. NG tube to low continuous suction draining moderate amount of light green fluid.  Pt had 2 bowel movements this shift. Respirations even and unlabored. Safety maintained. Bed in lowest position, side rails up x 3, call light and personal items within reach. Pt notified to call for assistance if needed. Pt verbalizes understanding. Will continue to monitor.

## 2017-01-30 NOTE — PLAN OF CARE
TN rounded on pt   Mr. Jef Johnson at bedside            01/30/17 1634   Discharge Reassessment   Assessment Type Discharge Planning Reassessment   Can the patient answer the patient profile reliably? Yes, cognitively intact   How does the patient rate their overall health at the present time? Fair   Describe the patient's ability to walk at the present time. Minor restrictions or changes   How often would a person be available to care for the patient? Whenever needed   Number of comorbid conditions (as recorded on the chart) Metastatic cancer   Discharge plan remains the same: Yes   Provided patient/caregiver education on the expected discharge date and the discharge plan Yes   Discharge Plan A Home with family;Home   Discharge Plan B Home;Home with family;Home Health   Change in patient condition or support system No   Patient choice form signed by patient/caregiver N/A   Explained to the the patient/caregiver why the discharge planned changed: (n/a)   Involved the patient/caregiver in establishing a new discharge plan: (n/a)

## 2017-01-30 NOTE — PLAN OF CARE
Problem: Patient Care Overview  Goal: Plan of Care Review  Outcome: Ongoing (interventions implemented as appropriate)  Pt is awake,alert and oriented. Pt complains of intermittent abdominal pain. Pt complains of intermittent nausea. Pt given all meds per MD orders. Pt NG tube put out 900ml this shift. Pt has multiple liquid bowel movements this shift.

## 2017-01-31 ENCOUNTER — TELEPHONE (OUTPATIENT)
Dept: FAMILY MEDICINE | Facility: CLINIC | Age: 52
End: 2017-01-31

## 2017-01-31 ENCOUNTER — PATIENT MESSAGE (OUTPATIENT)
Dept: HEMATOLOGY/ONCOLOGY | Facility: CLINIC | Age: 52
End: 2017-01-31

## 2017-01-31 ENCOUNTER — PATIENT MESSAGE (OUTPATIENT)
Dept: NEUROLOGY | Facility: HOSPITAL | Age: 52
End: 2017-01-31

## 2017-01-31 VITALS
OXYGEN SATURATION: 97 % | HEART RATE: 95 BPM | WEIGHT: 159 LBS | HEIGHT: 64 IN | TEMPERATURE: 98 F | DIASTOLIC BLOOD PRESSURE: 79 MMHG | BODY MASS INDEX: 27.14 KG/M2 | SYSTOLIC BLOOD PRESSURE: 117 MMHG | RESPIRATION RATE: 20 BRPM

## 2017-01-31 PROBLEM — E86.0 DEHYDRATION: Status: RESOLVED | Noted: 2017-01-27 | Resolved: 2017-01-31

## 2017-01-31 LAB
ALBUMIN SERPL BCP-MCNC: 3.9 G/DL
ALP SERPL-CCNC: 67 U/L
ALT SERPL W/O P-5'-P-CCNC: 21 U/L
ANION GAP SERPL CALC-SCNC: 10 MMOL/L
AST SERPL-CCNC: 25 U/L
BASOPHILS # BLD AUTO: 0.09 K/UL
BASOPHILS NFR BLD: 1.5 %
BILIRUB SERPL-MCNC: 0.9 MG/DL
BUN SERPL-MCNC: 17 MG/DL
CALCIUM SERPL-MCNC: 10.1 MG/DL
CHLORIDE SERPL-SCNC: 93 MMOL/L
CO2 SERPL-SCNC: 37 MMOL/L
CREAT SERPL-MCNC: 0.8 MG/DL
DIFFERENTIAL METHOD: ABNORMAL
EOSINOPHIL # BLD AUTO: 0.1 K/UL
EOSINOPHIL NFR BLD: 1.7 %
ERYTHROCYTE [DISTWIDTH] IN BLOOD BY AUTOMATED COUNT: 17.5 %
EST. GFR  (AFRICAN AMERICAN): >60 ML/MIN/1.73 M^2
EST. GFR  (NON AFRICAN AMERICAN): >60 ML/MIN/1.73 M^2
GLUCOSE SERPL-MCNC: 132 MG/DL
HCT VFR BLD AUTO: 39 %
HGB BLD-MCNC: 13 G/DL
LYMPHOCYTES # BLD AUTO: 1.7 K/UL
LYMPHOCYTES NFR BLD: 28.4 %
MCH RBC QN AUTO: 28.8 PG
MCHC RBC AUTO-ENTMCNC: 33.3 %
MCV RBC AUTO: 86 FL
MONOCYTES # BLD AUTO: 0.8 K/UL
MONOCYTES NFR BLD: 14.3 %
NEUTROPHILS # BLD AUTO: 3.1 K/UL
NEUTROPHILS NFR BLD: 53.9 %
PLATELET # BLD AUTO: 519 K/UL
PMV BLD AUTO: 9.6 FL
POTASSIUM SERPL-SCNC: 3.3 MMOL/L
PROT SERPL-MCNC: 7.6 G/DL
RBC # BLD AUTO: 4.52 M/UL
SODIUM SERPL-SCNC: 140 MMOL/L
WBC # BLD AUTO: 5.81 K/UL

## 2017-01-31 PROCEDURE — 25000003 PHARM REV CODE 250: Performed by: STUDENT IN AN ORGANIZED HEALTH CARE EDUCATION/TRAINING PROGRAM

## 2017-01-31 PROCEDURE — 85025 COMPLETE CBC W/AUTO DIFF WBC: CPT

## 2017-01-31 PROCEDURE — 63600175 PHARM REV CODE 636 W HCPCS: Performed by: STUDENT IN AN ORGANIZED HEALTH CARE EDUCATION/TRAINING PROGRAM

## 2017-01-31 PROCEDURE — 25000003 PHARM REV CODE 250: Performed by: SURGERY

## 2017-01-31 PROCEDURE — 80053 COMPREHEN METABOLIC PANEL: CPT

## 2017-01-31 PROCEDURE — 94761 N-INVAS EAR/PLS OXIMETRY MLT: CPT

## 2017-01-31 PROCEDURE — 36415 COLL VENOUS BLD VENIPUNCTURE: CPT

## 2017-01-31 RX ORDER — HEPARIN 100 UNIT/ML
5 SYRINGE INTRAVENOUS
Status: DISCONTINUED | OUTPATIENT
Start: 2017-01-31 | End: 2017-01-31 | Stop reason: HOSPADM

## 2017-01-31 RX ADMIN — KETOROLAC TROMETHAMINE 15 MG: 30 INJECTION, SOLUTION INTRAMUSCULAR at 03:01

## 2017-01-31 RX ADMIN — GABAPENTIN 300 MG: 300 CAPSULE ORAL at 08:01

## 2017-01-31 RX ADMIN — CLINDAMYCIN IN 5 PERCENT DEXTROSE 600 MG: 12 INJECTION, SOLUTION INTRAVENOUS at 02:01

## 2017-01-31 RX ADMIN — ENOXAPARIN SODIUM 40 MG: 100 INJECTION SUBCUTANEOUS at 12:01

## 2017-01-31 RX ADMIN — KETOROLAC TROMETHAMINE 15 MG: 30 INJECTION, SOLUTION INTRAMUSCULAR at 08:01

## 2017-01-31 RX ADMIN — HEPARIN SODIUM (PORCINE) LOCK FLUSH IV SOLN 100 UNIT/ML 500 UNITS: 100 SOLUTION at 04:01

## 2017-01-31 RX ADMIN — RAMELTEON 8 MG: 8 TABLET, FILM COATED ORAL at 02:01

## 2017-01-31 RX ADMIN — ACETAMINOPHEN 1000 MG: 10 INJECTION, SOLUTION INTRAVENOUS at 06:01

## 2017-01-31 RX ADMIN — HYDROMORPHONE HYDROCHLORIDE 0.5 MG: 1 INJECTION, SOLUTION INTRAMUSCULAR; INTRAVENOUS; SUBCUTANEOUS at 01:01

## 2017-01-31 RX ADMIN — AMLODIPINE BESYLATE 2.5 MG: 2.5 TABLET ORAL at 08:01

## 2017-01-31 RX ADMIN — LEUCINE, PHENYLALANINE, LYSINE, METHIONINE, ISOLEUCINE, VALINE, HISTIDINE, THREONINE, TRYPTOPHAN, ALANINE, GLYCINE, ARGININE, PROLINE, SERINE, TYROSINE, SODIUM ACETATE, DIBASIC POTASSIUM PHOSPHATE, MAGNESIUM CHLORIDE, SODIUM CHLORIDE, CALCIUM CHLORIDE, DEXTROSE
201; 154; 159; 110; 165; 160; 132; 116; 50; 570; 283; 316; 187; 138; 11; 217; 261; 51; 112; 33; 10 INJECTION INTRAVENOUS at 08:01

## 2017-01-31 RX ADMIN — CLINDAMYCIN IN 5 PERCENT DEXTROSE 600 MG: 12 INJECTION, SOLUTION INTRAVENOUS at 08:01

## 2017-01-31 NOTE — PROGRESS NOTES
"[facility]    CC: constipation    HPI 52 y.o. female with HTN, SC trait, and metastatic pancreatic cancer who was admitted with chronic, worsening, severe constipation associated with bowel obstruction in the splenic flexure.  Colonoscopy with colonic stent completed 1/30/17, with marked improvement and multiple BMs afterward.      This morning, she is without pain or nausea and is hungry and wishes for the NG tube to be removed.  Additional information received from her nurse.    Past Medical History    has a past medical history of Chemotherapy follow-up examination (12/28/2016); HTN (hypertension); Pancreatic mass; Paronychia (1/23/2017); Sickle cell trait; SOB (shortness of breath) (1/23/2017); and Swelling of lower extremity (1/23/2017).      Review of Systems  General ROS: negative for chills, fever or weight loss  Cardiovascular ROS: no chest pain or dyspnea on exertion  Gastrointestinal ROS: no abdominal pain, nausea, vomiting, or GI bleeding    Physical Examination  Visit Vitals    /81 (BP Location: Right arm, Patient Position: Lying, BP Method: Automatic)    Pulse 93    Temp 98.1 °F (36.7 °C) (Oral)    Resp 20    Ht 5' 4" (1.626 m)    Wt 72.1 kg (159 lb)    SpO2 98%    Breastfeeding No    BMI 27.29 kg/m2     General appearance: alert, cooperative, no distress  HENT: Normocephalic, atraumatic, neck symmetrical, no nasal discharge   Lungs: clear to auscultation in all fields, symmetric chest wall expansion bilaterally, no wheeze, rale, or rhonchi  Heart: normal rate, regular rhythm without rub; palpable peripheral pulsesI   Abdomen: soft, non-tender, non-distended, +bowel sounds  Extremities: extremities symmetric; no clubbing, cyanosis, or edema  Neurologic: Alert and oriented X 3, normal strength, normal coordination    Labs:    H/H 12/38    Imaging:    Gastrograffin Enema 1/27/17:    Complete obstruction at the level of the splenic flexure.    I have personally reviewed these " images.    Assessment:   51 yo AAF with SC trait, HTN, and metastatic pancreatic cancer on chemotherapy requiring narcotic pain medication, with colonic obstruction at the splenic flexure, likely secondary to inflammatory effect or invasion of nearby pancreatic cancer. Now s/p colonic stent placement with resolution of obstruction and symptoms.    Plan:  - Agree with NG tube removal and conservative diet, with discharge soon if tolerated  - Recommend daily stool softener for life.    Brenda Willis MD  [employer] GI PGY V  [phone]

## 2017-01-31 NOTE — PROGRESS NOTES
Progress Note  General Surgery    Admit Date: 1/26/2017  Post-operative Day: 1 Day Post-Op  Hospital Day: 6    SUBJECTIVE:     Colonic Stent placed yesterday. She had numerous loose bowel movements, at least 9 with her last bm at 0200 this morning. She denies any lightheadedness, fever, chills, n/v/d, or any other complaints. States abdominal pain much better. Requesting a diet.     Scheduled Meds:   amlodipine  2.5 mg Oral Daily    clindamycin 600 MG/50 ML D5W  600 mg Intravenous Q8H    enoxaparin  40 mg Subcutaneous Daily    gabapentin  300 mg Oral BID     Continuous Infusions:   Amino acid 2.75% - dextrose 10% (CLINIMIX-E) solution with additives ( 1L provides 27.5 gm AA, 100 gm CHO (340 kcal/L dextrose), Na 35, K 30, Mg 5, Ca 4.5, Acetate 51, Cl 39, Phos 15) 100 mL/hr at 01/30/17 1112     PRN Meds:bisacodyl, diphenhydrAMINE, HYDROmorphone, HYDROmorphone, HYDROmorphone, HYDROmorphone, ketorolac, ondansetron, ondansetron, promethazine (PHENERGAN) IVPB, promethazine (PHENERGAN) IVPB, ramelteon, sodium chloride 0.9%    Review of patient's allergies indicates:  No Known Allergies      OBJECTIVE:     Vital Signs (Most Recent)  Temp: 98.1 °F (36.7 °C) (01/31/17 0510)  Pulse: 85 (01/31/17 0510)  Resp: 19 (01/31/17 0510)  BP: 128/89 (01/31/17 0510)  SpO2: 100 % (01/31/17 0428)    Vital Signs Range (Last 24H):  Temp:  [97.7 °F (36.5 °C)-98.2 °F (36.8 °C)]   Pulse:  [85-94]   Resp:  [14-19]   BP: ()/(50-89)   SpO2:  [96 %-100 %]     I & O (Last 24H):    Intake/Output Summary (Last 24 hours) at 01/31/17 0633  Last data filed at 01/31/17 0600   Gross per 24 hour   Intake          4074.67 ml   Output             1900 ml   Net          2174.67 ml       NGT: 700 cc of dark green brown fluid    Physical Exam:  NAD, resting comfortably  NGT in place  Non labored breathing  RRR   abd soft, no distension and no TTP    Laboratory:  CBC: pending  CMP: pending    Diagnostic Results:  None    ASSESSMENT/PLAN:     Camilo  Alex is a 53 yo F with Pancreatic CA admitted for Large Bowel Obstruction  - Pull NGT and start clear liquids  - cont PPN  - OOB/ambulate/IS  - Monitor amount of bowel movements with dehydration  -Pain control as needed  -If above going well, then discharge home    Tony Worrell MD

## 2017-01-31 NOTE — PLAN OF CARE
Problem: Patient Care Overview  Goal: Plan of Care Review  Outcome: Ongoing (interventions implemented as appropriate)  Pt on RA, SPO2 98 %.  Pt in no apparent respiratory distress. Will continue to monitor.

## 2017-01-31 NOTE — PROGRESS NOTES
.Pharmacy New Medication Education    Patient accepted medication education.    Pharmacy educated patient on the following medications, using the teach-back method.   Dilaudid  Toradol  Zofran  phenergan      Learners of pharmacy medication education included:  patient    Patient +/- learner response:  verbalize understanding

## 2017-01-31 NOTE — PLAN OF CARE
Problem: Patient Care Overview  Goal: Plan of Care Review  Outcome: Ongoing (interventions implemented as appropriate)  Discharged  Instructions given with teachback method utilized  No new prescriptions   Pt aware to advance her diet slowly     tpn to port weaned off slowly  Reduced 50 ml per hour for 1.5 hour then 25 per hour until access removal prior to discharge    Blood return to port seen prior to saline and heparin flush    Peripheral iv access removed    Safety maintained this shift.     Tolerating clear liquid diet   Afebrile this shift  Family here  To exit per wheelchair accompanied by transport

## 2017-01-31 NOTE — PLAN OF CARE
Pt AA&O x 4. No complaints of abdominal pain or nausea. Respirations even and unlabored. No evidence of distress noted. Pt had several loose brown bowel movements. NG tube to LCWS draining dark green fluid. Safety maintained. Bed in lowest position, side rails up x 3, call light and personal items within reach. Pt notified to call for assistance if needed. Pt verbalizes understanding. Will continue to monitor.

## 2017-01-31 NOTE — PLAN OF CARE
for d/c to home today   Future Appointments  Date Time Provider Department Center   2/2/2017 9:20 AM LAB, HEMONC CANCER BLDG [facility]   2/2/2017 10:30 AM [facility], [facility]   2/3/2017 9:30 AM INJECTION, [facility] INFUSION [facility]   2/6/2017 2:00 PM Sebas Zaman DO, FACP [facility] HEM ONC [facility]   2/9/2017 11:00 AM [facility], CHEMO [facility]   2/10/2017 1:15 PM INJECTION, [facility] INFUSION [facility]   2/16/2017 11:30 AM [facility], CHEMO [facility]   2/17/2017 9:45 AM INJECTION, [facility] INFUSION [facility]   3/10/2017 1:50 PM Pepper Buckner MD [facility] DERM Joni Adrian Alarcon's nurse to call pt with f/u apt          01/31/17 0432   Final Note   Assessment Type Final Discharge Note   Discharge Disposition Home   Discharge planning education complete? Yes   What phone number can be called within the next 1-3 days to see how you are doing after discharge? [phone]   Hospital Follow Up  Appt(s) scheduled? Yes   Discharge plans and expectations educations in teach back method with documentation complete? Yes   Offered [facility] -- Bedside Delivery? n/a   Discharge/Hospital Encounter Summary to (non[facility]) PCP n/a   Referral to Outpatient Case Management complete? n/a   Referral to / orders for Home Health Complete? n/a   30 day supply of medicines given at discharge, if documented non-compliance / non-adherence? n/a   Any social issues identified prior to discharge? n/a   Did you assess the readiness or willingness of the family or caregiver to support self management of care? Yes

## 2017-01-31 NOTE — TELEPHONE ENCOUNTER
----- Message from Salma Garg MA sent at 1/31/2017  1:59 PM CST -----      ----- Message -----     From: Elena Aguayo     Sent: 1/31/2017   1:36 PM       To: Dorian Durán, case management, called.   No. [phone]    Please call patient to schedule a hospital follow up.  Patient had a bowel obstruction.  Patient no. [phone]

## 2017-02-01 NOTE — PROGRESS NOTES
rec'd call from Dr. Alarcon's nurse - gave her another phone # for pt's spouse - she is calling with an apt/date withh pt's pcp.

## 2017-02-01 NOTE — DISCHARGE SUMMARY
"[facility]  Discharge Summary  General Surgery      Admit Date: 1/26/2017    Discharge Date and Time: 1/31/17    Attending Physician: Dr. Schwab    Discharge Provider: Dr. Schwab    Reason for Admission: uncontrollable abdominal pain, nausea and emesis    Procedures Performed: Procedure(s) (LRB):  COLONOSCOPY (N/A)    Hospital Course   Camilo Johnson is a 53 yo F with Hx of Pancreatic Cancer who is admitted for large bowel obstruction. Patient says she has been having these issues for quite sometime. She is diagnosed to have a tumor at the tail of her pancreas with metastasis to the lungs, currently on chemotherapy. CT ABdomen Pelvis shows a likely external compression at the splenic flexure of the colon. Patient was started on NGT for decompression with much relief of her nausea and emesis. She was kept on NGT until a colonoscopy was performed to place a colonic stent. Following the stent, the patient evacuated many episodes of stool and her abdomen was "back to normal", per patient. On day of discharge, she was able to ambulate, tolerate meals, and was given prescriptions as listed below    Consults: dietary, ID, GI, Oncology, and nephrology    Significant Diagnostic Studies: CBC, CMP, UA, Colonoscopy, CT Abdomen/Pelvis    Final Diagnoses:   Principal Problem: Large bowel obstruction   Secondary Diagnoses:   Active Hospital Problems    Diagnosis  POA    *Large bowel obstruction [K56.60]  Yes    Malnutrition compromising bodily function [E46]  Yes    Pancreatic cancer metastasized to liver [C25.9, C78.7]  Yes    Generalized abdominal pain [R10.84]  Yes    Intestinal obstruction [K56.60]  Yes      Resolved Hospital Problems    Diagnosis Date Resolved POA    Dehydration [E86.0] 01/31/2017 Yes       Discharged Condition: good    Disposition:     Follow Up/Patient Instructions:  Follow up with Dr. Zaman for oncological need and next step for pancreatic cancer.    Medications:  Reconciled " Home Medications:   Discharge Medication List as of 1/31/2017  3:16 PM      CONTINUE these medications which have NOT CHANGED    Details   amlodipine (NORVASC) 2.5 MG tablet Take 2.5 mg by mouth once daily., Until Discontinued, Historical Med      bisacodyl (DULCOLAX) 5 mg EC tablet Take 2-4 tablets every other day as needed for constipation, OTC      gabapentin (NEURONTIN) 300 MG capsule Take 1 capsule (300 mg total) by mouth 2 (two) times daily., Starting 9/8/2016, Until Discontinued, Normal      GINGER ROOT (GINGER EXTRACT ORAL) Take by mouth., Until Discontinued, Historical Med      HYDROmorphone (DILAUDID) 4 MG tablet Take 1 tablet (4 mg total) by mouth every 6 (six) hours as needed for Pain., Starting 1/11/2017, Until Thu 1/11/18, Normal      morphine (MS CONTIN) 100 MG 12 hr tablet Take 1 tablet (100 mg total) by mouth 2 (two) times daily., Starting 1/10/2017, Until Discontinued, Normal      multivitamin capsule Take 1 capsule by mouth once daily., Until Discontinued, Historical Med      ondansetron (ZOFRAN) 8 MG tablet Take 1 tablet (8 mg total) by mouth every 8 (eight) hours as needed for Nausea., Starting 1/10/2017, Until Wed 1/10/18, Normal      polyethylene glycol (GLYCOLAX) 17 gram PwPk Take 17 g by mouth 2 (two) times daily., Starting 1/25/2017, Until Discontinued, Normal      promethazine (PHENERGAN) 25 MG tablet Take 1 tablet (25 mg total) by mouth every 6 (six) hours as needed for Nausea., Starting 1/11/2017, Until Discontinued, Normal      TURMERIC ROOT EXTRACT ORAL Take by mouth., Until Discontinued, Historical Med      vitamin D3-folic acid 5,000-1 unit-mg Tab Take by mouth once daily., Until Discontinued, Historical Med         STOP taking these medications       clindamycin (CLEOCIN) 300 MG capsule Comments:   Reason for Stopping:               Discharge Procedure Orders  Diet general     Activity as tolerated     Call MD for:  temperature >100.4     Call MD for:  persistent nausea and vomiting  or diarrhea     Call MD for:  severe uncontrolled pain     Call MD for:  redness, tenderness, or signs of infection (pain, swelling, redness, odor or green/yellow discharge around incision site)     Call MD for:  difficulty breathing or increased cough     Call MD for:  severe persistent headache     Call MD for:  worsening rash     Call MD for:  persistent dizziness, light-headedness, or visual disturbances     Call MD for:  increased confusion or weakness     No dressing needed       Follow-up Information     Follow up with Sebas Zaman DO, FACP On 2/6/2017.    Specialty:  Hematology and Oncology    Why:  2:00 pm  -- previously booked     Contact information:    [address]  [facility] [address]  [phone]          Follow up with Latoya Alarcon MD.    Specialty:  Family Medicine    Why:  Dr. Alarcon's nurse will call you with an appointment     Contact information:    [address] [place] [id number] [phone]

## 2017-02-02 ENCOUNTER — PATIENT MESSAGE (OUTPATIENT)
Dept: NEUROLOGY | Facility: HOSPITAL | Age: 52
End: 2017-02-02

## 2017-02-02 NOTE — TELEPHONE ENCOUNTER
Spoke with patient's  and left a message about patient's appointment for Friday 02/03/17 @ 11 am.

## 2017-02-03 ENCOUNTER — OFFICE VISIT (OUTPATIENT)
Dept: FAMILY MEDICINE | Facility: CLINIC | Age: 52
End: 2017-02-03
Payer: COMMERCIAL

## 2017-02-03 VITALS
BODY MASS INDEX: 25.25 KG/M2 | OXYGEN SATURATION: 98 % | SYSTOLIC BLOOD PRESSURE: 124 MMHG | WEIGHT: 147.94 LBS | DIASTOLIC BLOOD PRESSURE: 76 MMHG | HEIGHT: 64 IN | HEART RATE: 90 BPM

## 2017-02-03 DIAGNOSIS — C78.7 PANCREATIC CANCER METASTASIZED TO LIVER: Primary | ICD-10-CM

## 2017-02-03 DIAGNOSIS — G62.9 NEUROPATHY: ICD-10-CM

## 2017-02-03 DIAGNOSIS — E46 MALNUTRITION COMPROMISING BODILY FUNCTION: ICD-10-CM

## 2017-02-03 DIAGNOSIS — C25.9 PANCREATIC CANCER METASTASIZED TO LIVER: Primary | ICD-10-CM

## 2017-02-03 DIAGNOSIS — I10 ESSENTIAL HYPERTENSION: ICD-10-CM

## 2017-02-03 DIAGNOSIS — R52 PAIN: ICD-10-CM

## 2017-02-03 DIAGNOSIS — R10.84 GENERALIZED ABDOMINAL PAIN: ICD-10-CM

## 2017-02-03 DIAGNOSIS — K56.609 LARGE BOWEL OBSTRUCTION: ICD-10-CM

## 2017-02-03 PROCEDURE — 99999 PR PBB SHADOW E&M-EST. PATIENT-LVL III: CPT | Mod: PBBFAC,,, | Performed by: FAMILY MEDICINE

## 2017-02-03 PROCEDURE — 99215 OFFICE O/P EST HI 40 MIN: CPT | Mod: S$GLB,,, | Performed by: FAMILY MEDICINE

## 2017-02-03 RX ORDER — GABAPENTIN 300 MG/1
300 CAPSULE ORAL 3 TIMES DAILY
Qty: 90 CAPSULE | Refills: 5 | Status: ON HOLD | OUTPATIENT
Start: 2017-02-03 | End: 2017-03-08 | Stop reason: HOSPADM

## 2017-02-03 RX ORDER — AMLODIPINE BESYLATE 2.5 MG/1
2.5 TABLET ORAL DAILY
Qty: 90 TABLET | Refills: 1 | Status: SHIPPED | OUTPATIENT
Start: 2017-02-03

## 2017-02-03 NOTE — PHYSICIAN QUERY
"PT Name: Camilo Johnson  MR #: [id number]  Physician Query Form - Nutrition Clarification   Reviewer  [email or web address]    This form is a permanent document in the medical record.     Query Date: February 3, 2017  By submitting this query, we are merely seeking further clarification of documentation.. Please utilize your independent clinical judgment when addressing the question(s) below.    The Medical record reflects the following:   Indicators     Supporting Clinical Findings Location in Medical Record    Wt/ BMI/ Usual Body Weight Height (inches): 64.02 in   Weight (kg): 72.1 kg   BMI (kg/m2): 27.27    Anthropometrics      Alb          TProt            Pre-Albumin 53 yo female admitted with ileus. PMH+ for HTN, SOB, pancreatic CA H&P    Acute or Chronic illness      % of estimated energy intake over a time frame from p.o., TF or TPN      Weight status over a time frame      Subcutaneous fat and/or muscle loss Cachexia  Anesthesia note    Reduced  strength      "Delayed wound healing:, "Failure to thrive"      "Fluid accumulation" or "Edema"      "Hypoalbuminemia"      Other:  Pt receiving Clinimix 2.75/10 at 100ml/hr. NG tube to suction.    pt NPO on TPN     Malnutrition compromising bodily function  Discharge summary     AND / [facility] Clinical Characteristics (October 2011)  A minimum of two characteristics is recommended for diagnosing either moderate or severe malnutrition    Mild Malnutrition Moderate Malnutrition Severe Malnutrition    Energy Intake from p.o., TF or TPN. < 75% intake of estimated energy needs for less than 7 days. < 75% intake of estimated energy needs for greater than 7 days. < 50% intake of estimated energy needs for > 5 days   Weight Loss 1-2% in 1 month  5% in 3 months  7.5% in 6 months  10% in one year 1-2 % in 1 week  5% in 1 month  7.5% in 3 months  10% in 6 months  20% in 1 year > 2% in 1 week  > 5% in 1 month  >7.5% in 3 months  >10% in 6 months  >20% in 1 year "   Physical Findings None *Mild subcutaneous fat and/or muscle loss  *Mild fluid accumulation  *Stage II decubitus  *Surgical wound or non-healing wound *Mod subcutaneous fat and/or muscle loss  *Mod/severe fluid accumulation  *Stage III or IV decubitus  *Non-healing surgical wound     Provider, please specify the diagnosis or diagnoses associated with above clinical findings.                                [ ] Mild Protein-Calorie Malnutrition  [xxx ] Moderate Protein-Calorie Malnutrition  [ ] Severe Protein-CalorieMalnutrition  [ ] Cachexia  [ ] Anorexia  [ ] Other Nutritional Diagnosis (Specify) ____________________________________  [ ] Clinically Undetermined    Please document in your progress notes daily for the duration of treatment, until resolved, and include in your discharge summary.

## 2017-02-04 NOTE — PROGRESS NOTES
Transitional Care Note  Subjective:       Patient ID: Camilo Johnson is a 52 y.o. female.  Chief Complaint: Hospital Follow Up    Family and/or Caretaker present at visit?  Yes.  Diagnostic tests reviewed/disposition: No diagnosic tests pending after this hospitalization.  Disease/illness education: Yes  Home health/community services discussion/referrals: Patient does not have home health established from hospital visit.  They do not need home health.  If needed, we will set up home health for the patient.   Establishment or re-establishment of referral orders for community resources: Hospice.   Discussion with other health care providers: No discussion with other health care providers necessary.   HPI   51 yo female with HTN and metastatic pancreatic CA presents for hospital f/u. Pt hospitalized from 1/26/16 through 1/31/17 due to large bowel obstruction. Pt had decompression with an NG tube. Pt hs constant abdominal pain which is mildly controlled with MS Contin 100 mg bid and Dilaudid 4 mg q 6hrs. Pt notes low back pain. Pt notes having daily BMs since her discharge and is passing flatus. Nausea is controlled with Zofran. Denies vomiting.   Pt has spread of her cancer to her lungs, bones and liver. Pt has been told that her cancer is incurable. Pt is planning to seek another opinion from the [facility]. Pt to follow-up with Dr. Zaman, her oncologist on 2/6/17.  Review of Systems   Constitutional: Negative for chills and fever.   Gastrointestinal: Positive for abdominal pain. Negative for anal bleeding and blood in stool.   Musculoskeletal: Positive for back pain.       Objective:      Physical Exam   Constitutional: She is oriented to person, place, and time. She appears well-developed and well-nourished.   51 yo female in discomfort due to pain.   HENT:   Head: Normocephalic and atraumatic.   Eyes: Conjunctivae are normal.   Neck: Normal range of motion. Neck supple. No JVD present. No thyromegaly  present.   Cardiovascular: Normal rate, regular rhythm and normal heart sounds.    Pulmonary/Chest: Effort normal and breath sounds normal. She has no wheezes. She has no rales.   Abdominal: Soft. Bowel sounds are normal. There is tenderness.   Lymphadenopathy:     She has no cervical adenopathy.   Neurological: She is alert and oriented to person, place, and time.   Psychiatric: She is not actively hallucinating. She exhibits a depressed mood. She is attentive.   Vitals reviewed.      Assessment:       1. Pancreatic cancer metastasized to liver    2. Large bowel obstruction    3. Essential hypertension    4. Neuropathy    5. Malnutrition compromising bodily function    6. Pain    7. Generalized abdominal pain        Plan:       1.  Pt [facility] for evaluation. Discussed incurable nature of her condition and hospice placement to maximize her quality of life.  2.   Pt to increase Dilaudid to 8 mg q 6hrs prn pain and continue MS Contin 100 mg bid.  3. F/U with Dr. Zaman on 2/6/17.

## 2017-02-06 ENCOUNTER — OFFICE VISIT (OUTPATIENT)
Dept: HEMATOLOGY/ONCOLOGY | Facility: CLINIC | Age: 52
End: 2017-02-06
Payer: COMMERCIAL

## 2017-02-06 ENCOUNTER — TELEPHONE (OUTPATIENT)
Dept: HEMATOLOGY/ONCOLOGY | Facility: CLINIC | Age: 52
End: 2017-02-06

## 2017-02-06 ENCOUNTER — LAB VISIT (OUTPATIENT)
Dept: LAB | Facility: HOSPITAL | Age: 52
End: 2017-02-06
Attending: INTERNAL MEDICINE
Payer: COMMERCIAL

## 2017-02-06 VITALS
DIASTOLIC BLOOD PRESSURE: 79 MMHG | TEMPERATURE: 97 F | WEIGHT: 148.81 LBS | BODY MASS INDEX: 25.41 KG/M2 | HEART RATE: 106 BPM | SYSTOLIC BLOOD PRESSURE: 137 MMHG | HEIGHT: 64 IN | RESPIRATION RATE: 16 BRPM | OXYGEN SATURATION: 99 %

## 2017-02-06 DIAGNOSIS — C25.9 MALIGNANT NEOPLASM OF PANCREAS, UNSPECIFIED LOCATION OF MALIGNANCY: Primary | ICD-10-CM

## 2017-02-06 DIAGNOSIS — C25.9 MALIGNANT NEOPLASM OF PANCREAS, UNSPECIFIED LOCATION OF MALIGNANCY: ICD-10-CM

## 2017-02-06 DIAGNOSIS — C25.2 MALIGNANT NEOPLASM OF TAIL OF PANCREAS: Primary | ICD-10-CM

## 2017-02-06 DIAGNOSIS — G89.3 CANCER ASSOCIATED PAIN: ICD-10-CM

## 2017-02-06 LAB
ALBUMIN SERPL BCP-MCNC: 3.6 G/DL
ALP SERPL-CCNC: 70 U/L
ALT SERPL W/O P-5'-P-CCNC: 12 U/L
ANION GAP SERPL CALC-SCNC: 7 MMOL/L
AST SERPL-CCNC: 16 U/L
BILIRUB SERPL-MCNC: 0.6 MG/DL
BUN SERPL-MCNC: 9 MG/DL
CALCIUM SERPL-MCNC: 10.2 MG/DL
CANCER AG19-9 SERPL-ACNC: ABNORMAL U/ML
CHLORIDE SERPL-SCNC: 98 MMOL/L
CO2 SERPL-SCNC: 32 MMOL/L
CREAT SERPL-MCNC: 0.8 MG/DL
ERYTHROCYTE [DISTWIDTH] IN BLOOD BY AUTOMATED COUNT: 16.7 %
EST. GFR  (AFRICAN AMERICAN): >60 ML/MIN/1.73 M^2
EST. GFR  (NON AFRICAN AMERICAN): >60 ML/MIN/1.73 M^2
GLUCOSE SERPL-MCNC: 94 MG/DL
HCT VFR BLD AUTO: 37.5 %
HGB BLD-MCNC: 12.4 G/DL
MCH RBC QN AUTO: 28.2 PG
MCHC RBC AUTO-ENTMCNC: 33.1 %
MCV RBC AUTO: 85 FL
NEUTROPHILS # BLD AUTO: 2.5 K/UL
PLATELET # BLD AUTO: 345 K/UL
PMV BLD AUTO: 9.8 FL
POTASSIUM SERPL-SCNC: 4.8 MMOL/L
PROT SERPL-MCNC: 7.9 G/DL
RBC # BLD AUTO: 4.39 M/UL
SODIUM SERPL-SCNC: 137 MMOL/L
WBC # BLD AUTO: 5.22 K/UL

## 2017-02-06 PROCEDURE — 99999 PR PBB SHADOW E&M-EST. PATIENT-LVL IV: CPT | Mod: PBBFAC,,, | Performed by: INTERNAL MEDICINE

## 2017-02-06 PROCEDURE — 36415 COLL VENOUS BLD VENIPUNCTURE: CPT

## 2017-02-06 PROCEDURE — 86301 IMMUNOASSAY TUMOR CA 19-9: CPT

## 2017-02-06 PROCEDURE — 3075F SYST BP GE 130 - 139MM HG: CPT | Mod: S$GLB,,, | Performed by: INTERNAL MEDICINE

## 2017-02-06 PROCEDURE — 85027 COMPLETE CBC AUTOMATED: CPT

## 2017-02-06 PROCEDURE — 99214 OFFICE O/P EST MOD 30 MIN: CPT | Mod: S$GLB,,, | Performed by: INTERNAL MEDICINE

## 2017-02-06 PROCEDURE — 3078F DIAST BP <80 MM HG: CPT | Mod: S$GLB,,, | Performed by: INTERNAL MEDICINE

## 2017-02-06 PROCEDURE — 80053 COMPREHEN METABOLIC PANEL: CPT

## 2017-02-06 NOTE — PROGRESS NOTES
PATIENT: Camilo Johnson  MRN: [id number]  DATE: 2017      Diagnosis:   1. Malignant neoplasm of tail of pancreas    2. Cancer associated pain    3. Paronychia, unspecified laterality        Chief Complaint: Pancreatic Cancer      Oncologic History:      Oncologic History Pancreatic cancer diagnosed 3/2016   Metastatic disease to lung and bone     Oncologic Treatment FOLFIRINOX ( discontinued due to progression)  Gemcitabine/nab-paclitaxel 2016     Pathology Adenocarcinoma          Subjective:    Interval History: Ms. Johnson is a 52 y.o. female who returns for follow up for pancreatic cancer.  She is a former patient of Dr. Fontenot.  She is currently on treatment with gemcitabine and nab paclitaxel.  Since I had seen her last she was hospitalized with a bowel obstruction requiring stent placement.  She complains of ongoing abdominal pain and took 10 hydromorphone tablets yesterday.  She did not require hydromorphone today.  She is ambulating and moving her bowels.  No other new complaints.    Past Medical History:   Past Medical History   Diagnosis Date    Cancer associated pain 2017    Chemotherapy follow-up examination 2016    HTN (hypertension)     Pancreatic mass     Paronychia 2017    Sickle cell trait     SOB (shortness of breath) 2017    Swelling of lower extremity 2017       Past Surgical HIstory:   Past Surgical History   Procedure Laterality Date     section      Tubal ligation      Breast surgery       breast reduction    Colonoscopy N/A 2017     Procedure: COLONOSCOPY;  Surgeon: Elvis Stein MD;  Location: [facility];  Service: Endoscopy;  Laterality: N/A;       Family History:   Family History   Problem Relation Age of Onset    Diabetes Mother     Stroke Father     Hypertension Father     Heart disease Father     Hypertension Brother     Diabetes Brother     Hypertension Brother        Social History:  reports that she has never  smoked. She has never used smokeless tobacco. She reports that she drinks alcohol. She reports that she does not use illicit drugs.    Allergies:  Review of patient's allergies indicates:  No Known Allergies    Medications:  Current Outpatient Prescriptions   Medication Sig Dispense Refill    amlodipine (NORVASC) 2.5 MG tablet Take 1 tablet (2.5 mg total) by mouth once daily. 90 tablet 1    gabapentin (NEURONTIN) 300 MG capsule Take 1 capsule (300 mg total) by mouth 3 (three) times daily. 90 capsule 5    GINGER ROOT (GINGER EXTRACT ORAL) Take by mouth.      HYDROmorphone (DILAUDID) 4 MG tablet Take 1 tablet (4 mg total) by mouth every 6 (six) hours as needed for Pain. 90 tablet 0    morphine (MS CONTIN) 100 MG 12 hr tablet Take 1 tablet (100 mg total) by mouth 2 (two) times daily. 60 tablet 0    multivitamin capsule Take 1 capsule by mouth once daily.      ondansetron (ZOFRAN) 8 MG tablet Take 1 tablet (8 mg total) by mouth every 8 (eight) hours as needed for Nausea. 60 tablet 2    polyethylene glycol (GLYCOLAX) 17 gram PwPk Take 17 g by mouth 2 (two) times daily. 100 each 3    promethazine (PHENERGAN) 25 MG tablet Take 1 tablet (25 mg total) by mouth every 6 (six) hours as needed for Nausea. 30 tablet 1    TURMERIC ROOT EXTRACT ORAL Take by mouth.      vitamin D3-folic acid 5,000-1 unit-mg Tab Take by mouth once daily.       No current facility-administered medications for this visit.        Review of Systems   Constitutional: Positive for appetite change. Negative for chills, fever and unexpected weight change.   HENT: Negative for congestion, hearing loss and nosebleeds.    Eyes: Negative for visual disturbance.   Respiratory: Negative for cough and shortness of breath.    Cardiovascular: Positive for leg swelling. Negative for chest pain and palpitations.   Gastrointestinal: Positive for abdominal pain. Negative for abdominal distention, blood in stool, constipation, diarrhea, nausea and vomiting.  "  Genitourinary: Negative for dysuria.   Musculoskeletal: Negative for back pain and gait problem.   Skin: Positive for wound. Negative for color change and rash.   Neurological: Positive for numbness. Negative for dizziness, weakness and headaches.   Hematological: Negative for adenopathy. Does not bruise/bleed easily.   Psychiatric/Behavioral: Negative for confusion.       ECOG Performance Status:   ECOG SCORE    1 - Restricted in strenuous activity-ambulatory and able to carry out work of a light nature          Objective:      Vitals:   Vitals:    02/06/17 1432   BP: 137/79   Pulse: 106   Resp: 16   Temp: 97.2 °F (36.2 °C)   TempSrc: Oral   SpO2: 99%   Weight: 67.5 kg (148 lb 13 oz)   Height: 5' 4" (1.626 m)     BMI: Body mass index is 25.54 kg/(m^2).    Physical Exam   Constitutional: She is oriented to person, place, and time. She appears well-developed and well-nourished. No distress.   HENT:   Head: Normocephalic.   Mouth/Throat: No oropharyngeal exudate.   Eyes: EOM are normal. No scleral icterus.   Neck: Neck supple. No tracheal deviation present. No thyromegaly present.   Cardiovascular: Normal rate and regular rhythm.    Pulmonary/Chest: Effort normal and breath sounds normal. No respiratory distress. She has no wheezes. She has no rales.   Abdominal: Soft. She exhibits distension. She exhibits no mass. There is tenderness. There is no rebound and no guarding.   Musculoskeletal: Normal range of motion. She exhibits edema.   Bilateral lower extremity edema right greater than left   Lymphadenopathy:     She has no cervical adenopathy.   Neurological: She is alert and oriented to person, place, and time. No cranial nerve deficit.   Skin: Skin is warm and dry.   Psychiatric: She has a normal mood and affect.       Laboratory Data:  Lab Visit on 02/06/2017   Component Date Value Ref Range Status    WBC 02/06/2017 5.22  3.90 - 12.70 K/uL Final    RBC 02/06/2017 4.39  4.00 - 5.40 M/uL Final    Hemoglobin " 02/06/2017 12.4  12.0 - 16.0 g/dL Final    Hematocrit 02/06/2017 37.5  37.0 - 48.5 % Final    MCV 02/06/2017 85  82 - 98 fL Final    MCH 02/06/2017 28.2  27.0 - 31.0 pg Final    MCHC 02/06/2017 33.1  32.0 - 36.0 % Final    RDW 02/06/2017 16.7* 11.5 - 14.5 % Final    Platelets 02/06/2017 345  150 - 350 K/uL Final    MPV 02/06/2017 9.8  9.2 - 12.9 fL Final    Gran # 02/06/2017 2.5  1.8 - 7.7 K/uL Final    Comment: The ANC is based on a white cell differential from an   automated cell counter. It has not been microscopically   reviewed for the presence of abnormal cells. Clinical   correlation is required.      Sodium 02/06/2017 137  136 - 145 mmol/L Final    Potassium 02/06/2017 4.8  3.5 - 5.1 mmol/L Final    Chloride 02/06/2017 98  95 - 110 mmol/L Final    CO2 02/06/2017 32* 23 - 29 mmol/L Final    Glucose 02/06/2017 94  70 - 110 mg/dL Final    BUN, Bld 02/06/2017 9  6 - 20 mg/dL Final    Creatinine 02/06/2017 0.8  0.5 - 1.4 mg/dL Final    Calcium 02/06/2017 10.2  8.7 - 10.5 mg/dL Final    Total Protein 02/06/2017 7.9  6.0 - 8.4 g/dL Final    Albumin 02/06/2017 3.6  3.5 - 5.2 g/dL Final    Total Bilirubin 02/06/2017 0.6  0.1 - 1.0 mg/dL Final    Comment: For infants and newborns, interpretation of results should be based  on gestational age, weight and in agreement with clinical  observations.  Premature Infant recommended reference ranges:  Up to 24 hours.............<8.0 mg/dL  Up to 48 hours............<12.0 mg/dL  3-5 days..................<15.0 mg/dL  6-29 days.................<15.0 mg/dL      Alkaline Phosphatase 02/06/2017 70  55 - 135 U/L Final    AST 02/06/2017 16  10 - 40 U/L Final    ALT 02/06/2017 12  10 - 44 U/L Final    Anion Gap 02/06/2017 7* 8 - 16 mmol/L Final    eGFR if African American 02/06/2017 >60.0  >60 mL/min/1.73 m^2 Final    eGFR if non African American 02/06/2017 >60.0  >60 mL/min/1.73 m^2 Final    Comment: Calculation used to obtain the estimated glomerular  filtration  rate (eGFR) is the CKD-EPI equation. Since race is unknown   in our information system, the eGFR values for   -American and Non--American patients are given   for each creatinine result.     Admission on 01/26/2017, Discharged on 01/31/2017   No results displayed because visit has over 200 results.            Imaging:   CT 1/17/16  CT OF THE CHEST, ABDOMEN & PELVIS WITH IV CONTRAST:       Technique: CT examination of the chest abdomen and pelvis was obtained using 5 mm axial images after the administration of 100 cc of Omnipaque 350 IV and PO Contrast.  Noncontrast, arterial, portal venous, and delayed phase images were obtained.  Coronal and sagittal reformats were obtained.    Comparison: CT chest abdomen pelvis from 10/25/2016     Clinical Indication:  Pancreatic mass    Results:    Chest:  Stable left-sided chest port with its tip in the innominate vein, unchanged.  Normal thyroid.  Thoracic aorta is normal in caliber and contour.  Trace pericardial effusion.  No cardiomegaly.  No evidence of mediastinal, hilar, or axillary lymphadenopathy.    The trachea and bronchi are patent.  The lungs demonstrate innumerable pulmonary nodules.  These nodules are stable in size and number, however multiple nodules appear more solid when compared to prior exam.  Nodule in the right middle lobe measures 7 mm, unchanged in size, however this nodule appears more dense when compared to prior exam (series 3, image 89).  Interval increase in right small pleural effusion.  No left-sided pleural effusion.    Abdomen/pelvis:  Liver is normal in size and contour.  No focal hepatic lesions.  Gallbladder is normal in appearance.  Portal vein is patent.    Relatively stable hypodense mass in the tail of the pancreas measuring 5.5 cm, previously 6.3 cm (series 3, image 115).  Slight difference in size is likely secondary to slight differences in technique.  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  This  mass abuts the left kidney, unchanged when compared to prior exam.  Left adrenal gland is not visualized and is likely involved. Collateral vessels are noted in the left upper quadrant.    Remaining portion of the pancreas is normal in appearance.  Spleen is normal in appearance.  Right adrenal gland is normal in appearance.  Right kidney is normal in appearance.  Urinary bladder is normal.  Uterus is normal in appearance.  Partially cystic structure measuring 4.2 x 3.1 cm in the right adnexal region.  This may represent a right complex adnexal cyst.  Left adnexa is normal in appearance.    The small bowel is normal in caliber.  No evidence of obstruction.  Moderate amount of stool throughout the colon.    No evidence of lymphadenopathy.    Osseous structures: Sclerotic lesion of T11 appears slightly larger in size when compared to prior examination.  Stable sclerosis of the L5 vertebral body and T9 vertebral body.  Stable sclerotic lesion of the right iliac bone.  No new sclerotic lesions.   Impression        1.  Stable hypodense mass in the pancreatic tail which occludes the splenic vein and may involve the left kidney.  Findings are not significantly changed when compared to prior exam.    2.  Innumerable pulmonary nodules which are stable in size and number, however some nodules appear more dense when compared to prior exam.    3.  Multiple stable sclerotic foci scattered throughout the osseous structures consistent with metastatic disease.  The sclerotic area in the T11 vertebral body is slightly more prominent when compared to prior exam.    4.  4.2 x 3.1 CM cystic lesion in the right adnexa which may represent a mildly complicated cyst.  Further evaluation with pelvic ultrasound is recommended.    5.  Slight increase in size of small right pleural effusion.              Assessment:       1. Malignant neoplasm of tail of pancreas    2. Cancer associated pain    3. Paronychia, unspecified laterality            Plan:      Mrs. Johnson has worsening abdominal pain and will increase MS Contin to 100 mg every 8 hours.  She can continue to use Dilaudid for breakthrough.  She is awaiting an appointment with dermatology and will hold chemotherapy for now.  Resume antibiotics.  Had also inquired about a clinical trial at [facility] and will reach out to them to see if she may be a potential candidate.      Sebas Zaman DO, [facility]  Hematology & Oncology  [facility] [place] [address]  ph. [phone]  Fax. [phone]    25 minutes were spent in coordination of patient's care, record review and counseling.  More than 50% of the time was face-to-face.

## 2017-02-07 ENCOUNTER — INITIAL CONSULT (OUTPATIENT)
Dept: DERMATOLOGY | Facility: CLINIC | Age: 52
End: 2017-02-07
Payer: COMMERCIAL

## 2017-02-07 VITALS — WEIGHT: 148 LBS | BODY MASS INDEX: 25.4 KG/M2

## 2017-02-07 DIAGNOSIS — L60.1 ONYCHOLYSIS: Primary | ICD-10-CM

## 2017-02-07 PROCEDURE — 99999 PR PBB SHADOW E&M-EST. PATIENT-LVL II: CPT | Mod: PBBFAC,,, | Performed by: DERMATOLOGY

## 2017-02-07 PROCEDURE — 99202 OFFICE O/P NEW SF 15 MIN: CPT | Mod: S$GLB,,, | Performed by: DERMATOLOGY

## 2017-02-07 RX ORDER — NEOMYCIN SULFATE, POLYMYXIN B SULFATE AND HYDROCORTISONE 10; 3.5; 1 MG/ML; MG/ML; [USP'U]/ML
SUSPENSION/ DROPS AURICULAR (OTIC)
Qty: 10 ML | Refills: 6 | Status: SHIPPED | OUTPATIENT
Start: 2017-02-07 | End: 2017-02-24

## 2017-02-07 NOTE — MR AVS SNAPSHOT
[place] [facility]   [facility]  [address] [address]  Phone: [phone]  Fax: [phone]                  Camilo Johnson   2017 3:30 PM   Initial consult    Description:  Female : 1965   Provider:  Tess Fuchs MD   Department:  [place] [facility]           Reason for Visit     Nail Problem           Diagnoses this Visit        Comments    Onycholysis    -  Primary            To Do List           Future Appointments        Provider Department Dept Phone    2/10/2017 1:15 PM INJECTION, [facility] [phone]    2017 11:30 AM [facility], CHEMO [facility] [phone]    2017 9:45 AM INJECTION, [facility] [phone]    3/10/2017 1:50 PM Pepper Buckner MD [facility]at[facility] [phone]      Goals (5 Years of Data)     None      Follow-Up and Disposition     Return if symptoms worsen or fail to improve.       These Medications        Disp Refills Start End    neomycin-polymyxin-hydrocortisone (CORTISPORIN) 3.5-10,000-1 mg/mL-unit/mL-% otic suspension 10 mL 6 2017     Use bid for nails    Pharmacy: [facility]/pharmacy #[id number] - [place] - [address] AT [facility] Ph #: [phone]       Notes to Pharmacy: This rx is used for nail diseases as well as otic applications      [facility]     [facility] On Call Nurse Care Line -  Assistance  Registered nurses in the [facility] On Call Center provide clinical advisement, health education, appointment booking, and other advisory services.  Call for this free service at [phone].             Medications           Message regarding Medications     Verify the changes and/or additions to your medication regime listed below are the same as discussed with your clinician today.  If any of these changes or additions are incorrect, please notify your healthcare provider.        START taking these  NEW medications        Refills    neomycin-polymyxin-hydrocortisone (CORTISPORIN) 3.5-10,000-1 mg/mL-unit/mL-% otic suspension 6    Sig: Use bid for nails    Class: Normal           Verify that the below list of medications is an accurate representation of the medications you are currently taking.  If none reported, the list may be blank. If incorrect, please contact your healthcare provider. Carry this list with you in case of emergency.           Current Medications     amlodipine (NORVASC) 2.5 MG tablet Take 1 tablet (2.5 mg total) by mouth once daily.    gabapentin (NEURONTIN) 300 MG capsule Take 1 capsule (300 mg total) by mouth 3 (three) times daily.    GINGER ROOT (GINGER EXTRACT ORAL) Take by mouth.    HYDROmorphone (DILAUDID) 4 MG tablet Take 1 tablet (4 mg total) by mouth every 6 (six) hours as needed for Pain.    morphine (MS CONTIN) 100 MG 12 hr tablet Take 1 tablet (100 mg total) by mouth 2 (two) times daily.    multivitamin capsule Take 1 capsule by mouth once daily.    ondansetron (ZOFRAN) 8 MG tablet Take 1 tablet (8 mg total) by mouth every 8 (eight) hours as needed for Nausea.    polyethylene glycol (GLYCOLAX) 17 gram PwPk Take 17 g by mouth 2 (two) times daily.    promethazine (PHENERGAN) 25 MG tablet Take 1 tablet (25 mg total) by mouth every 6 (six) hours as needed for Nausea.    TURMERIC ROOT EXTRACT ORAL Take by mouth.    vitamin D3-folic acid 5,000-1 unit-mg Tab Take by mouth once daily.    neomycin-polymyxin-hydrocortisone (CORTISPORIN) 3.5-10,000-1 mg/mL-unit/mL-% otic suspension Use bid for nails           Clinical Reference Information           Your Vitals Were     Weight BMI             67.1 kg (148 lb) 25.4 kg/m2         Allergies as of 2/7/2017     No Known Allergies      Immunizations Administered on Date of Encounter - 2/7/2017     None      Language Assistance Services     ATTENTION: Language assistance services are available, free of charge. Please call [phone].       ATENCIÓN: Si habla español, tiene a elizabeth disposición servicios gratuitos de asistencia lingüística. Gay al [phone].     CHÚ Ý: N?u b?n nói Ti?ng Vi?t, có các d?ch v? h? tr? ngôn ng? mi?n phí dành cho b?n. G?i s? [phone].         [place] [facility] complies with applicable Federal civil rights laws and does not discriminate on the basis of race, color, national origin, age, disability, or sex.

## 2017-02-07 NOTE — LETTER
February 7, 2017      Sebas Zaman DO, FACP  [address] [facility] [address]           [place] [facility]  [address]  [place] [address]  Phone: [phone]  Fax: [phone]          Patient: Camilo Johnson   MR Number: [id number]   YOB: 1965   Date of Visit: 2/7/2017       Dear Dr. Sebas Zaman:    Thank you for referring Camilo Johnson to me for evaluation. Attached you will find relevant portions of my assessment and plan of care.    If you have questions, please do not hesitate to call me. I look forward to following Camilo Johnson along with you.    Sincerely,    Tess Fuchs MD    Enclosure  CC:  No Recipients    If you would like to receive this communication electronically, please contact [email or web address] or [phone] to request more information on [employer] Link access.    For providers and/or their staff who would like to refer a patient to [facility], please contact us through our one-stop-shop provider referral line, [facility], at [phone].    If you feel you have received this communication in error or would no longer like to receive these types of communications, please e-mail [email or web address]

## 2017-02-07 NOTE — PROGRESS NOTES
Subjective:       Patient ID:  Camilo Johnson is a 52 y.o. female who presents for   Chief Complaint   Patient presents with    Nail Problem     HPI Comments: Onycholysis for over a month no tx not painful.    Nail Problem         Review of Systems   Constitutional: Negative for fever.   Skin: Negative for itching and rash.   Hematologic/Lymphatic: Does not bruise/bleed easily.        Objective:    Physical Exam   Skin:   Areas Examined (abnormalities noted in diagram):   Nails and Digits Inspection Performed                   Assessment / Plan:        Onycholysis  -     neomycin-polymyxin-hydrocortisone (CORTISPORIN) 3.5-10,000-1 mg/mL-unit/mL-% otic suspension; Use bid for nails  Dispense: 10 mL; Refill: 6  Vinegar soaks half half water vinegar             Return if symptoms worsen or fail to improve.

## 2017-02-08 ENCOUNTER — PATIENT MESSAGE (OUTPATIENT)
Dept: HEMATOLOGY/ONCOLOGY | Facility: CLINIC | Age: 52
End: 2017-02-08

## 2017-02-13 ENCOUNTER — PATIENT MESSAGE (OUTPATIENT)
Dept: HEMATOLOGY/ONCOLOGY | Facility: CLINIC | Age: 52
End: 2017-02-13

## 2017-02-14 ENCOUNTER — PATIENT MESSAGE (OUTPATIENT)
Dept: HEMATOLOGY/ONCOLOGY | Facility: CLINIC | Age: 52
End: 2017-02-14

## 2017-02-14 DIAGNOSIS — C25.0 MALIGNANT NEOPLASM OF HEAD OF PANCREAS: ICD-10-CM

## 2017-02-14 DIAGNOSIS — G89.3 CANCER ASSOCIATED PAIN: Primary | ICD-10-CM

## 2017-02-14 RX ORDER — MORPHINE SULFATE 30 MG/1
30 TABLET, FILM COATED, EXTENDED RELEASE ORAL EVERY 8 HOURS PRN
Qty: 90 TABLET | Refills: 0 | Status: SHIPPED | OUTPATIENT
Start: 2017-02-14 | End: 2017-02-23 | Stop reason: SDUPTHER

## 2017-02-14 RX ORDER — MORPHINE SULFATE 100 MG/1
100 TABLET, FILM COATED, EXTENDED RELEASE ORAL 2 TIMES DAILY
Qty: 60 TABLET | Refills: 0 | Status: ON HOLD | OUTPATIENT
Start: 2017-02-14 | End: 2017-03-08 | Stop reason: HOSPADM

## 2017-02-15 ENCOUNTER — TELEPHONE (OUTPATIENT)
Dept: NEUROLOGY | Facility: HOSPITAL | Age: 52
End: 2017-02-15

## 2017-02-15 ENCOUNTER — PATIENT MESSAGE (OUTPATIENT)
Dept: NEUROLOGY | Facility: HOSPITAL | Age: 52
End: 2017-02-15

## 2017-02-15 DIAGNOSIS — K56.609 COLONIC OBSTRUCTION: Primary | ICD-10-CM

## 2017-02-15 NOTE — TELEPHONE ENCOUNTER
Spoke with pt and informed her that orders in for gastric graph and enema per Dr. Stein request. Gave pt phone number to set up date. No other concerns noted.

## 2017-02-15 NOTE — TELEPHONE ENCOUNTER
----- Message from Kalyani Shore sent at 2/15/2017  8:13 AM CST -----  Contact: Patient  GI- Patient called to inform us the patients Stent came out this morning. Please call the patient at [phone].

## 2017-02-16 ENCOUNTER — TELEPHONE (OUTPATIENT)
Dept: NEUROLOGY | Facility: HOSPITAL | Age: 52
End: 2017-02-16

## 2017-02-16 ENCOUNTER — INFUSION (OUTPATIENT)
Dept: INFUSION THERAPY | Facility: HOSPITAL | Age: 52
End: 2017-02-16
Attending: INTERNAL MEDICINE
Payer: COMMERCIAL

## 2017-02-16 VITALS — HEART RATE: 113 BPM | SYSTOLIC BLOOD PRESSURE: 152 MMHG | RESPIRATION RATE: 18 BRPM | DIASTOLIC BLOOD PRESSURE: 93 MMHG

## 2017-02-16 DIAGNOSIS — C25.2 MALIGNANT NEOPLASM OF TAIL OF PANCREAS: Primary | ICD-10-CM

## 2017-02-16 PROCEDURE — 96367 TX/PROPH/DG ADDL SEQ IV INF: CPT

## 2017-02-16 PROCEDURE — 25000003 PHARM REV CODE 250: Performed by: INTERNAL MEDICINE

## 2017-02-16 PROCEDURE — 96417 CHEMO IV INFUS EACH ADDL SEQ: CPT

## 2017-02-16 PROCEDURE — 96413 CHEMO IV INFUSION 1 HR: CPT

## 2017-02-16 PROCEDURE — 63600175 PHARM REV CODE 636 W HCPCS: Performed by: INTERNAL MEDICINE

## 2017-02-16 RX ORDER — HEPARIN 100 UNIT/ML
500 SYRINGE INTRAVENOUS
Status: DISCONTINUED | OUTPATIENT
Start: 2017-02-16 | End: 2017-02-16 | Stop reason: HOSPADM

## 2017-02-16 RX ORDER — SODIUM CHLORIDE 0.9 % (FLUSH) 0.9 %
10 SYRINGE (ML) INJECTION
Status: DISCONTINUED | OUTPATIENT
Start: 2017-02-16 | End: 2017-02-16 | Stop reason: HOSPADM

## 2017-02-16 RX ADMIN — GRANISETRON HYDROCHLORIDE 1 MG: 0.1 INJECTION, SOLUTION INTRAVENOUS at 12:02

## 2017-02-16 RX ADMIN — SODIUM CHLORIDE 1750 MG: 0.9 INJECTION, SOLUTION INTRAVENOUS at 01:02

## 2017-02-16 RX ADMIN — PACLITAXEL 220 MG: 100 INJECTION, POWDER, LYOPHILIZED, FOR SUSPENSION INTRAVENOUS at 12:02

## 2017-02-16 RX ADMIN — HEPARIN 500 UNITS: 100 SYRINGE at 02:02

## 2017-02-16 NOTE — TELEPHONE ENCOUNTER
Spoke with pt  and emailed over instructions for test at [email or web address]. No other concerns noted.

## 2017-02-17 ENCOUNTER — INFUSION (OUTPATIENT)
Dept: INFUSION THERAPY | Facility: HOSPITAL | Age: 52
End: 2017-02-17
Attending: INTERNAL MEDICINE
Payer: COMMERCIAL

## 2017-02-17 DIAGNOSIS — C25.2 MALIGNANT NEOPLASM OF TAIL OF PANCREAS: Primary | ICD-10-CM

## 2017-02-17 PROCEDURE — 63600175 PHARM REV CODE 636 W HCPCS: Performed by: INTERNAL MEDICINE

## 2017-02-17 PROCEDURE — 96372 THER/PROPH/DIAG INJ SC/IM: CPT

## 2017-02-17 RX ADMIN — FILGRASTIM 480 MCG: 480 INJECTION, SOLUTION INTRAVENOUS; SUBCUTANEOUS at 11:02

## 2017-02-19 ENCOUNTER — PATIENT MESSAGE (OUTPATIENT)
Dept: NEUROLOGY | Facility: HOSPITAL | Age: 52
End: 2017-02-19

## 2017-02-20 ENCOUNTER — PATIENT MESSAGE (OUTPATIENT)
Dept: HEMATOLOGY/ONCOLOGY | Facility: CLINIC | Age: 52
End: 2017-02-20

## 2017-02-20 ENCOUNTER — TELEPHONE (OUTPATIENT)
Dept: NEUROLOGY | Facility: HOSPITAL | Age: 52
End: 2017-02-20

## 2017-02-20 NOTE — TELEPHONE ENCOUNTER
Spoke with pt and stated that she was confused about the instructions for the test. Gave pt phone number to scheduling so pt can reschedule test. No other concerns noted.

## 2017-02-20 NOTE — TELEPHONE ENCOUNTER
----- Message from Kalyani Shore sent at 2/20/2017  8:06 AM CST -----  Contact: Patient  GI- Patient was supposed to have a procedure this morning at 8 am and did not go because the  that called gave different instructions that she received from the doctor. Patient can be reached at [phone].

## 2017-02-21 ENCOUNTER — HOSPITAL ENCOUNTER (OUTPATIENT)
Dept: RADIOLOGY | Facility: HOSPITAL | Age: 52
Discharge: HOME | End: 2017-02-21
Attending: INTERNAL MEDICINE
Payer: COMMERCIAL

## 2017-02-21 DIAGNOSIS — K56.609 COLONIC OBSTRUCTION: ICD-10-CM

## 2017-02-21 PROCEDURE — 25500020 PHARM REV CODE 255: Performed by: INTERNAL MEDICINE

## 2017-02-21 PROCEDURE — 74270 X-RAY XM COLON 1CNTRST STD: CPT | Mod: 26,,, | Performed by: RADIOLOGY

## 2017-02-21 PROCEDURE — 74270 X-RAY XM COLON 1CNTRST STD: CPT | Mod: TC

## 2017-02-21 RX ADMIN — DIATRIZOATE MEGLUMINE AND DIATRIZOATE SODIUM 960 ML: 660; 100 LIQUID ORAL; RECTAL at 09:02

## 2017-02-22 ENCOUNTER — PATIENT MESSAGE (OUTPATIENT)
Dept: HEMATOLOGY/ONCOLOGY | Facility: CLINIC | Age: 52
End: 2017-02-22

## 2017-02-23 ENCOUNTER — DOCUMENTATION ONLY (OUTPATIENT)
Dept: NEUROLOGY | Facility: HOSPITAL | Age: 52
End: 2017-02-23

## 2017-02-23 ENCOUNTER — TELEPHONE (OUTPATIENT)
Dept: NEUROLOGY | Facility: HOSPITAL | Age: 52
End: 2017-02-23

## 2017-02-23 ENCOUNTER — LAB VISIT (OUTPATIENT)
Dept: LAB | Facility: HOSPITAL | Age: 52
End: 2017-02-23
Attending: FAMILY MEDICINE

## 2017-02-23 DIAGNOSIS — G89.3 CANCER ASSOCIATED PAIN: ICD-10-CM

## 2017-02-23 DIAGNOSIS — C25.9 MALIGNANT NEOPLASM OF PANCREAS, UNSPECIFIED LOCATION OF MALIGNANCY: ICD-10-CM

## 2017-02-23 LAB
ALBUMIN SERPL BCP-MCNC: 2.9 G/DL
ALP SERPL-CCNC: 84 U/L
ALT SERPL W/O P-5'-P-CCNC: 15 U/L
ANION GAP SERPL CALC-SCNC: 9 MMOL/L
AST SERPL-CCNC: 24 U/L
BILIRUB SERPL-MCNC: 0.6 MG/DL
BUN SERPL-MCNC: 6 MG/DL
CALCIUM SERPL-MCNC: 9.5 MG/DL
CHLORIDE SERPL-SCNC: 98 MMOL/L
CO2 SERPL-SCNC: 28 MMOL/L
CREAT SERPL-MCNC: 0.6 MG/DL
ERYTHROCYTE [DISTWIDTH] IN BLOOD BY AUTOMATED COUNT: 16.5 %
EST. GFR  (AFRICAN AMERICAN): >60 ML/MIN/1.73 M^2
EST. GFR  (NON AFRICAN AMERICAN): >60 ML/MIN/1.73 M^2
GLUCOSE SERPL-MCNC: 110 MG/DL
HCT VFR BLD AUTO: 34.2 %
HGB BLD-MCNC: 11.5 G/DL
MCH RBC QN AUTO: 27.3 PG
MCHC RBC AUTO-ENTMCNC: 33.6 %
MCV RBC AUTO: 81 FL
NEUTROPHILS # BLD AUTO: 2.8 K/UL
PLATELET # BLD AUTO: 189 K/UL
PMV BLD AUTO: 10.1 FL
POTASSIUM SERPL-SCNC: 4.2 MMOL/L
PROT SERPL-MCNC: 7.2 G/DL
RBC # BLD AUTO: 4.21 M/UL
SODIUM SERPL-SCNC: 135 MMOL/L
WBC # BLD AUTO: 4.75 K/UL

## 2017-02-23 PROCEDURE — 80053 COMPREHEN METABOLIC PANEL: CPT

## 2017-02-23 PROCEDURE — 85027 COMPLETE CBC AUTOMATED: CPT

## 2017-02-23 PROCEDURE — 36415 COLL VENOUS BLD VENIPUNCTURE: CPT | Mod: PO

## 2017-02-23 RX ORDER — MORPHINE SULFATE 30 MG/1
30 TABLET, FILM COATED, EXTENDED RELEASE ORAL EVERY 8 HOURS PRN
Qty: 90 TABLET | Refills: 0 | Status: ON HOLD | OUTPATIENT
Start: 2017-02-23 | End: 2017-03-08 | Stop reason: HOSPADM

## 2017-02-23 NOTE — TELEPHONE ENCOUNTER
Spoke to patient.  Clarified the way she takes her Morphine and also clarified her chemo schedule with her to see when she last got chemo and found out she was due today.  I apologized for the miscommunication related to her dermatology appt. . Will attempt to schedule day 8 for today or tomorrow.

## 2017-02-23 NOTE — PROGRESS NOTES
Imaging reviewed with patient and . 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 Colonic stent improved her symptoms but passed spontaneously. She feels ok currently but may need repeat endoscopy to replace the stent (potentially with adding clips to prevent migration) if signs or symptoms of current obstruction recur. She will call if these symptoms develop then will plan for inpatient admit one day then stent placement next day

## 2017-02-23 NOTE — TELEPHONE ENCOUNTER
----- Message from Jyoti Keller sent at 2/23/2017 11:51 AM CST -----  Appointment Request From: Camilo Johnson         With Provider: Sebas Zaman DO, FACP [[place] [facility]]        Would Accept With:Only the person I've selected        Preferred Date Range: From 2/27/2017 To 2/28/2017        Preferred Times: Any        Reason for visit: Request an Appt        Comments:

## 2017-02-23 NOTE — TELEPHONE ENCOUNTER
Scheduled appointment for chemo for tomorrow and labs today.  Will call her to tell her to increase her pain meds to 160mg 3 times daily.    We will refill when she needs one.

## 2017-02-23 NOTE — TELEPHONE ENCOUNTER
----- Message from Manoj Rodriguez sent at 2/23/2017  4:08 PM CST -----  Hello, please put in labs Thanks

## 2017-02-23 NOTE — TELEPHONE ENCOUNTER
Returned pts  call. Pt had gastrograffin test 2 days ago and they saw the results online, they are wondering what the next step is. Will forward message to Dr. Stein.

## 2017-02-23 NOTE — TELEPHONE ENCOUNTER
----- Message from Alexia Schrader sent at 2/23/2017  9:39 AM CST -----  GI- Patient's  (Jef) called stating he would like blood work results. Please call Jef back at [phone]. Thanks

## 2017-02-24 ENCOUNTER — HOSPITAL ENCOUNTER (INPATIENT)
Facility: HOSPITAL | Age: 52
LOS: 13 days | Discharge: HOME | DRG: 948 | End: 2017-03-09
Attending: EMERGENCY MEDICINE | Admitting: INTERNAL MEDICINE
Payer: COMMERCIAL

## 2017-02-24 DIAGNOSIS — C25.9 PANCREATIC CANCER METASTASIZED TO LIVER: ICD-10-CM

## 2017-02-24 DIAGNOSIS — R52 UNCONTROLLED PAIN: ICD-10-CM

## 2017-02-24 DIAGNOSIS — C25.2 MALIGNANT NEOPLASM OF TAIL OF PANCREAS: ICD-10-CM

## 2017-02-24 DIAGNOSIS — C78.7 PANCREATIC CANCER METASTASIZED TO LIVER: ICD-10-CM

## 2017-02-24 DIAGNOSIS — G89.3 CANCER ASSOCIATED PAIN: ICD-10-CM

## 2017-02-24 DIAGNOSIS — R11.15 INTRACTABLE CYCLICAL VOMITING WITH NAUSEA: ICD-10-CM

## 2017-02-24 DIAGNOSIS — R52 INTRACTABLE PAIN: Primary | ICD-10-CM

## 2017-02-24 PROBLEM — K56.609 INTESTINAL OBSTRUCTION: Status: RESOLVED | Noted: 2017-01-26 | Resolved: 2017-02-24

## 2017-02-24 PROBLEM — R10.84 GENERALIZED ABDOMINAL PAIN: Status: RESOLVED | Noted: 2017-01-26 | Resolved: 2017-02-24

## 2017-02-24 PROBLEM — K56.609 LARGE BOWEL OBSTRUCTION: Status: RESOLVED | Noted: 2017-01-26 | Resolved: 2017-02-24

## 2017-02-24 LAB
ALBUMIN SERPL BCP-MCNC: 2.9 G/DL
ALP SERPL-CCNC: 83 U/L
ALT SERPL W/O P-5'-P-CCNC: 13 U/L
ANION GAP SERPL CALC-SCNC: 12 MMOL/L
AST SERPL-CCNC: 22 U/L
BASOPHILS # BLD AUTO: 0.02 K/UL
BASOPHILS NFR BLD: 0.3 %
BILIRUB SERPL-MCNC: 0.7 MG/DL
BUN SERPL-MCNC: 6 MG/DL
CALCIUM SERPL-MCNC: 9.4 MG/DL
CHLORIDE SERPL-SCNC: 100 MMOL/L
CO2 SERPL-SCNC: 23 MMOL/L
CREAT SERPL-MCNC: 0.5 MG/DL
DIFFERENTIAL METHOD: ABNORMAL
EOSINOPHIL # BLD AUTO: 0.1 K/UL
EOSINOPHIL NFR BLD: 2 %
ERYTHROCYTE [DISTWIDTH] IN BLOOD BY AUTOMATED COUNT: 16.5 %
EST. GFR  (AFRICAN AMERICAN): >60 ML/MIN/1.73 M^2
EST. GFR  (NON AFRICAN AMERICAN): >60 ML/MIN/1.73 M^2
GLUCOSE SERPL-MCNC: 94 MG/DL
HCT VFR BLD AUTO: 34.6 %
HGB BLD-MCNC: 11.7 G/DL
LYMPHOCYTES # BLD AUTO: 1.5 K/UL
LYMPHOCYTES NFR BLD: 25.2 %
MCH RBC QN AUTO: 27.5 PG
MCHC RBC AUTO-ENTMCNC: 33.8 %
MCV RBC AUTO: 81 FL
MONOCYTES # BLD AUTO: 0.5 K/UL
MONOCYTES NFR BLD: 7.9 %
NEUTROPHILS # BLD AUTO: 3.9 K/UL
NEUTROPHILS NFR BLD: 63.4 %
PLATELET # BLD AUTO: 208 K/UL
PMV BLD AUTO: 9.4 FL
POTASSIUM SERPL-SCNC: 4 MMOL/L
PROT SERPL-MCNC: 7.2 G/DL
RBC # BLD AUTO: 4.26 M/UL
SODIUM SERPL-SCNC: 135 MMOL/L
WBC # BLD AUTO: 6.07 K/UL

## 2017-02-24 PROCEDURE — 96374 THER/PROPH/DIAG INJ IV PUSH: CPT

## 2017-02-24 PROCEDURE — 80053 COMPREHEN METABOLIC PANEL: CPT

## 2017-02-24 PROCEDURE — 99285 EMERGENCY DEPT VISIT HI MDM: CPT | Mod: 25

## 2017-02-24 PROCEDURE — 63600175 PHARM REV CODE 636 W HCPCS: Performed by: INTERNAL MEDICINE

## 2017-02-24 PROCEDURE — 96375 TX/PRO/DX INJ NEW DRUG ADDON: CPT

## 2017-02-24 PROCEDURE — 96376 TX/PRO/DX INJ SAME DRUG ADON: CPT

## 2017-02-24 PROCEDURE — 85025 COMPLETE CBC W/AUTO DIFF WBC: CPT

## 2017-02-24 PROCEDURE — 63600175 PHARM REV CODE 636 W HCPCS: Performed by: ANESTHESIOLOGY

## 2017-02-24 PROCEDURE — 99285 EMERGENCY DEPT VISIT HI MDM: CPT | Mod: ,,, | Performed by: EMERGENCY MEDICINE

## 2017-02-24 PROCEDURE — 12000002 HC ACUTE/MED SURGE SEMI-PRIVATE ROOM

## 2017-02-24 RX ORDER — ONDANSETRON 4 MG/1
8 TABLET, FILM COATED ORAL EVERY 8 HOURS PRN
Status: DISCONTINUED | OUTPATIENT
Start: 2017-02-25 | End: 2017-03-09 | Stop reason: HOSPADM

## 2017-02-24 RX ORDER — ONDANSETRON 2 MG/ML
4 INJECTION INTRAMUSCULAR; INTRAVENOUS EVERY 8 HOURS PRN
Status: DISCONTINUED | OUTPATIENT
Start: 2017-02-25 | End: 2017-03-02

## 2017-02-24 RX ORDER — HYDROMORPHONE HYDROCHLORIDE 1 MG/ML
2 INJECTION, SOLUTION INTRAMUSCULAR; INTRAVENOUS; SUBCUTANEOUS
Status: COMPLETED | OUTPATIENT
Start: 2017-02-24 | End: 2017-02-24

## 2017-02-24 RX ORDER — HYDROMORPHONE HYDROCHLORIDE 1 MG/ML
2 INJECTION, SOLUTION INTRAMUSCULAR; INTRAVENOUS; SUBCUTANEOUS
Status: DISCONTINUED | OUTPATIENT
Start: 2017-02-25 | End: 2017-02-26

## 2017-02-24 RX ORDER — CHOLECALCIFEROL (VITAMIN D3) 25 MCG
5000 TABLET ORAL DAILY
Status: DISCONTINUED | OUTPATIENT
Start: 2017-02-25 | End: 2017-03-09 | Stop reason: HOSPADM

## 2017-02-24 RX ORDER — PROMETHAZINE HYDROCHLORIDE 25 MG/1
25 TABLET ORAL EVERY 6 HOURS PRN
Status: DISCONTINUED | OUTPATIENT
Start: 2017-02-25 | End: 2017-03-09 | Stop reason: HOSPADM

## 2017-02-24 RX ORDER — ENOXAPARIN SODIUM 100 MG/ML
40 INJECTION SUBCUTANEOUS EVERY 24 HOURS
Status: DISCONTINUED | OUTPATIENT
Start: 2017-02-25 | End: 2017-03-09 | Stop reason: HOSPADM

## 2017-02-24 RX ORDER — AMOXICILLIN 250 MG
1 CAPSULE ORAL 2 TIMES DAILY PRN
Status: DISCONTINUED | OUTPATIENT
Start: 2017-02-25 | End: 2017-03-02

## 2017-02-24 RX ORDER — MORPHINE SULFATE 100 MG/1
200 TABLET, FILM COATED, EXTENDED RELEASE ORAL EVERY 12 HOURS
Status: DISCONTINUED | OUTPATIENT
Start: 2017-02-25 | End: 2017-02-25

## 2017-02-24 RX ORDER — HYDROMORPHONE HYDROCHLORIDE 2 MG/1
6 TABLET ORAL
Status: DISCONTINUED | OUTPATIENT
Start: 2017-02-25 | End: 2017-02-26

## 2017-02-24 RX ORDER — AMLODIPINE BESYLATE 2.5 MG/1
2.5 TABLET ORAL DAILY
Status: DISCONTINUED | OUTPATIENT
Start: 2017-02-25 | End: 2017-03-09 | Stop reason: HOSPADM

## 2017-02-24 RX ORDER — FOLIC ACID 1 MG/1
1 TABLET ORAL DAILY
Status: DISCONTINUED | OUTPATIENT
Start: 2017-02-25 | End: 2017-03-09 | Stop reason: HOSPADM

## 2017-02-24 RX ORDER — GABAPENTIN 300 MG/1
300 CAPSULE ORAL 3 TIMES DAILY
Status: DISCONTINUED | OUTPATIENT
Start: 2017-02-25 | End: 2017-03-05

## 2017-02-24 RX ORDER — POLYETHYLENE GLYCOL 3350 17 G/17G
17 POWDER, FOR SOLUTION ORAL 2 TIMES DAILY
Status: DISCONTINUED | OUTPATIENT
Start: 2017-02-25 | End: 2017-03-09 | Stop reason: HOSPADM

## 2017-02-24 RX ORDER — HYDROMORPHONE HYDROCHLORIDE 1 MG/ML
2 INJECTION, SOLUTION INTRAMUSCULAR; INTRAVENOUS; SUBCUTANEOUS ONCE
Status: COMPLETED | OUTPATIENT
Start: 2017-02-24 | End: 2017-02-24

## 2017-02-24 RX ORDER — RAMELTEON 8 MG/1
8 TABLET ORAL NIGHTLY PRN
Status: DISCONTINUED | OUTPATIENT
Start: 2017-02-24 | End: 2017-03-07

## 2017-02-24 RX ADMIN — HYDROMORPHONE HYDROCHLORIDE 2 MG: 1 INJECTION, SOLUTION INTRAMUSCULAR; INTRAVENOUS; SUBCUTANEOUS at 07:02

## 2017-02-24 RX ADMIN — HYDROMORPHONE HYDROCHLORIDE 2 MG: 1 INJECTION, SOLUTION INTRAMUSCULAR; INTRAVENOUS; SUBCUTANEOUS at 10:02

## 2017-02-24 RX ADMIN — HYDROMORPHONE HYDROCHLORIDE 2 MG: 1 INJECTION, SOLUTION INTRAMUSCULAR; INTRAVENOUS; SUBCUTANEOUS at 08:02

## 2017-02-24 NOTE — TELEPHONE ENCOUNTER
----- Message from Shoshana Carranza sent at 2/24/2017 12:01 PM CST -----  Contact: self  Patient need to speak with nurse regarding personal matter. patient can be reached  at [phone]

## 2017-02-24 NOTE — IP AVS SNAPSHOT
[facility]  [address]  [facility] [address]  Phone: [phone]           Patient Discharge Instructions     Our goal is to set you up for success. This packet includes information on your condition, medications, and your home care. It will help you to care for yourself so you don't get sicker and need to go back to the hospital.     Please ask your nurse if you have any questions.        There are many details to remember when preparing to leave the hospital. Here is what you will need to do:    1. Take your medicine. If you are prescribed medications, review your Medication List in the following pages. You may have new medications to  at the pharmacy and others that you'll need to stop taking. Review the instructions for how and when to take your medications. Talk with your doctor or nurses if you are unsure of what to do.     2. Go to your follow-up appointments. Specific follow-up information is listed in the following pages. Your may be contacted by a transition nurse or clinical provider about future appointments. Be sure we have all of the phone numbers to reach you, if needed. Please contact your provider's office if you are unable to make an appointment.     3. Watch for warning signs. Your doctor or nurse will give you detailed warning signs to watch for and when to call for assistance. These instructions may also include educational information about your condition. If you experience any of warning signs to your health, call your doctor.               [facility]  Unless otherwise directed by your provider, please contact [facility], our nurse care line that is available for 24/7 assistance.     [phone] (toll-free)    Registered nurses in the [facility] provide clinical advisement, health education, appointment booking, and other advisory services.                    ** Verify the list of medication(s) below is accurate and up  to date. Carry this with you in case of emergency. If your medications have changed, please notify your healthcare provider.             Medication List      START taking these medications        Additional Info                      * methadone 10 MG tablet   Commonly known as:  DOLOPHINE   Quantity:  14 tablet   Refills:  0   Dose:  10 mg    Last time this was given:  15 mg on 3/9/2017  8:34 AM   Instructions:  Take 1 tablet (10 mg total) by mouth once daily.     Begin Date    AM    Noon    PM    Bedtime       * methadone 5 MG tablet   Commonly known as:  DOLOPHINE   Quantity:  90 tablet   Refills:  0   Dose:  15 mg    Last time this was given:  15 mg on 3/9/2017  8:34 AM   Instructions:  Take 3 tablets (15 mg total) by mouth 2 (two) times daily.     Begin Date    AM    Noon    PM    Bedtime       mirtazapine 7.5 MG Tab   Commonly known as:  REMERON   Quantity:  30 tablet   Refills:  0   Dose:  7.5 mg    Instructions:  Take 1 tablet (7.5 mg total) by mouth every evening.     Begin Date    AM    Noon    PM    Bedtime       * Notice:  This list has 2 medication(s) that are the same as other medications prescribed for you. Read the directions carefully, and ask your doctor or other care provider to review them with you.      CHANGE how you take these medications        Additional Info                      gabapentin 300 MG capsule   Commonly known as:  NEURONTIN   Quantity:  90 capsule   Refills:  0   Dose:  600 mg   What changed:  how much to take    Last time this was given:  600 mg on 3/9/2017  5:27 AM   Instructions:  Take 2 capsules (600 mg total) by mouth 3 (three) times daily.     Begin Date    AM    Noon    PM    Bedtime       HYDROmorphone 2 MG tablet   Commonly known as:  DILAUDID   Quantity:  60 tablet   Refills:  0   Dose:  8 mg   What changed:    - medication strength  - how much to take  - when to take this  - reasons to take this    Last time this was given:  10 mg on 3/9/2017 12:39 PM   Instructions:   Take 4 tablets (8 mg total) by mouth every 3 (three) hours as needed.     Begin Date    AM    Noon    PM    Bedtime         CONTINUE taking these medications        Additional Info                      amlodipine 2.5 MG tablet   Commonly known as:  NORVASC   Quantity:  90 tablet   Refills:  1   Dose:  2.5 mg    Last time this was given:  2.5 mg on 3/9/2017  9:43 AM   Instructions:  Take 1 tablet (2.5 mg total) by mouth once daily.     Begin Date    AM    Noon    PM    Bedtime       GINGER EXTRACT ORAL   Refills:  0    Instructions:  Take by mouth.     Begin Date    AM    Noon    PM    Bedtime       multivitamin capsule   Refills:  0   Dose:  1 capsule    Instructions:  Take 1 capsule by mouth once daily.     Begin Date    AM    Noon    PM    Bedtime       ondansetron 8 MG tablet   Commonly known as:  ZOFRAN   Quantity:  60 tablet   Refills:  2   Dose:  8 mg    Instructions:  Take 1 tablet (8 mg total) by mouth every 8 (eight) hours as needed for Nausea.     Begin Date    AM    Noon    PM    Bedtime       polyethylene glycol 17 gram Pwpk   Commonly known as:  GLYCOLAX   Quantity:  100 each   Refills:  3   Dose:  17 g    Last time this was given:  17 g on 3/9/2017  9:42 AM   Instructions:  Take 17 g by mouth 2 (two) times daily.     Begin Date    AM    Noon    PM    Bedtime       promethazine 25 MG tablet   Commonly known as:  PHENERGAN   Quantity:  30 tablet   Refills:  1   Dose:  25 mg    Instructions:  Take 1 tablet (25 mg total) by mouth every 6 (six) hours as needed for Nausea.     Begin Date    AM    Noon    PM    Bedtime       TURMERIC ROOT EXTRACT ORAL   Refills:  0    Instructions:  Take by mouth.     Begin Date    AM    Noon    PM    Bedtime       vitamin D3-folic acid 5,000 unit- 1 mg Tab   Refills:  0    Instructions:  Take by mouth once daily.     Begin Date    AM    Noon    PM    Bedtime         STOP taking these medications     morphine 100 MG 12 hr tablet   Commonly known as:  MS CONTIN       morphine  30 MG 12 hr tablet   Commonly known as:  MS CONTIN            Where to Get Your Medications      You can get these medications from any pharmacy     Bring a paper prescription for each of these medications     gabapentin 300 MG capsule    HYDROmorphone 2 MG tablet    methadone 10 MG tablet    methadone 5 MG tablet    mirtazapine 7.5 MG Tab                  Please bring to all follow up appointments:    1. A copy of your discharge instructions.  2. All medicines you are currently taking in their original bottles.  3. Identification and insurance card.    Please arrive 15 minutes ahead of scheduled appointment time.    Please call 24 hours in advance if you must reschedule your appointment and/or time.        Your Scheduled Appointments     Mar 14, 2017 11:00 AM CDT   Lab & Visit with EVA, KENNER [facility])    [address] [facility] [address] [phone]            Mar 15, 2017  8:30 AM CDT   Established Patient Visit with Sebas Zaman DO, FACP [facility] ([facility])    [address]  [place] [address] [phone]            Mar 17, 2017  2:30 PM CDT   Infusion 180 MIn with [facility], CHEMO   [facility] ([facility])    [address] [facility] [address] [phone]              Follow-up Information     Follow up with Sebas Zaman DO, FACP.    Specialty:  Hematology and Oncology    Why:  as scheduled by clinic    Contact information:    [address] [facility] [id number] [phone]          Discharge Instructions     Future Orders    Activity as tolerated     Call MD for:  difficulty breathing or increased cough     Call MD for:  increased confusion or weakness     Call MD for:  persistent dizziness, light-headedness, or visual disturbances     Call MD for:  persistent nausea and vomiting or diarrhea     Call MD for:  severe persistent headache     Call MD for:  severe  "uncontrolled pain     Call MD for:  temperature >100.4     COMMODE FOR HOME USE     Questions:    Type:  Standard    Height:  5' 4" (1.626 m)    Weight:  65.8 kg (145 lb 1 oz)    Does patient have medical equipment at home?:  none    Length of need (1-99 months):  12    Diet general     Questions:    Total calories:      Fat restriction, if any:      Protein restriction, if any:      Na restriction, if any:      Fluid restriction:      Additional restrictions:      WALKER FOR HOME USE     Questions:    Type of Walker:  Adult (5'4"-6'6")    With wheels?:  Yes    Height:  5' 4" (1.626 m)    Weight:  65.8 kg (145 lb 1 oz)    Length of need (1-99 months):  12    Platform attachment:      Accessories/Other:      Assistance needed:      Does patient have medical equipment at home?:  none    Please check all that apply:  Patient's condition impairs ambulation.    Patient is unable to safely ambulate without equipment.        Discharge Instructions         Colonoscopy     A camera attached to a flexible tube with a viewing lens is used to take video pictures.     Colonoscopy is a test to view the inside of your lower digestive tract (colon and rectum). Sometimes it can show the last part of the small intestine (ileum). During the test, small pieces of tissue may be removed for testing. This is called a biopsy. Small growths, such as polyps, may also be removed.   Why is colonoscopy done?  The test is done to help look for colon cancer. And it can help find the source of abdominal pain, bleeding, and changes in bowel habits. It may be needed once a year, depending on factors such as your:  · Age  · Health history  · Family health history  · Symptoms  · Results from any prior colonoscopy  Risks and possible complications  These include:  · Bleeding               · A puncture or tear in the colon   · Risks of anesthesia  · A cancer lesion not being seen  Getting ready   To prepare for the test:  · Talk with your healthcare " provider about the risks of the test (see below). Also ask your healthcare provider about alternatives to the test.  · Tell your healthcare provider about any medicines you take. Also tell him or her about any health conditions you may have.  · Make sure your rectum and colon are empty for the test. Follow the diet and bowel prep instructions exactly. If you dont, the test may need to be rescheduled.  · Plan for a friend or family member to drive you home after the test.     Colonoscopy provides an inside view of the entire colon.     You may discuss the results with your doctor right away or at a future visit.  During the test   The test is usually done in the hospital on an outpatient basis. This means you go home the same day. The procedure takes about 30 minutes. During that time:  · You are given relaxing (sedating) medicine through an IV line. You may be drowsy, or fully asleep.  · The healthcare provider will first give you a physical exam to check for anal and rectal problems.  · Then the anus is lubricated and the scope inserted.  · If you are awake, you may have a feeling similar to needing to have a bowel movement. You may also feel pressure as air is pumped into the colon. Its OK to pass gas during the procedure.  · Biopsy, polyp removal, or other treatments may be done during the test.  After the test   You may have gas right after the test. It can help to try to pass it to help prevent later bloating. Your healthcare provider may discuss the results with you right away. Or you may need to schedule a follow-up visit to talk about the results. After the test, you can go back to your normal eating and other activities. You may be tired from the sedation and need to rest for a few hours.  Date Last Reviewed: 11/1/2016  © 2566-7275 [employer]. [address] [place] [address], [place] [address]. All rights reserved. This information is not intended as a substitute for professional medical care.  "Always follow your healthcare professional's instructions.            Primary Diagnosis     Your primary diagnosis was:  Uncontrolled Pain      Admission Information     Date & Time Provider Department CSN    2/24/2017  6:23 PM Shahid Spann MD [facility] [id number]      Care Providers     Provider Role Specialty Primary office phone    Shahid Spann MD Attending Provider Hematology and Oncology [phone]    Shahid Spann MD Team Attending  Hematology and Oncology [phone]    Jan De Leon MD Consulting Physician  Colon and Rectal Surgery  [phone]    Jan De Leon MD Surgeon  Colon and Rectal Surgery [phone]      Your Vitals Were     BP Pulse Temp Resp Height Weight    129/86 (BP Location: Left arm, Patient Position: Lying, BP Method: Automatic) 115 99 °F (37.2 °C) (Oral) 19 5' 4" (1.626 m) 65.8 kg (145 lb 1 oz)    SpO2 BMI             96% 24.9 kg/m2         Recent Lab Values        12/30/2013 3/18/2016                        9:00 AM 11:03 AM          A1C 5.8 5.8                     Allergies as of 3/9/2017     No Known Allergies      Advance Directives     An advance directive is a document which, in the event you are no longer able to make decisions for yourself, tells your healthcare team what kind of treatment you do or do not want to receive, or who you would like to make those decisions for you.  If you do not currently have an advance directive, [facility] encourages you to create one.  For more information call:  [phone]), [phone] ([phone]),  or log on to [email or web address].        Language Assistance Services     ATTENTION: Language assistance services are available, free of charge. Please call [phone].      ATENCIÓN: Si habla español, tiene a elizabeth disposición servicios gratuitos de asistencia lingüística. Llame al [phone].     CHÚ Ý: N?u b?n nói Ti?ng Vi?t, có các d?ch v? h? tr? ngôn ng? mi?n phí dành cho b?n. " G?i s? [phone].         [facility] complies with applicable Federal civil rights laws and does not discriminate on the basis of race, color, national origin, age, disability, or sex.

## 2017-02-24 NOTE — TELEPHONE ENCOUNTER
Spoke to nurse while patient in chemo.  She is, per Dr. Alvarez, to go to the ER for pain control.   If she was unable to speak to anyone at her place of business, it was told to me that she was to call her insurance company, ask for the [employer] department and explain the circumstances and it should be resolved that way.

## 2017-02-25 LAB
BILIRUB UR QL STRIP: NEGATIVE
CLARITY UR REFRACT.AUTO: CLEAR
COLOR UR AUTO: YELLOW
GLUCOSE UR QL STRIP: NEGATIVE
HGB UR QL STRIP: NEGATIVE
KETONES UR QL STRIP: ABNORMAL
LEUKOCYTE ESTERASE UR QL STRIP: NEGATIVE
NITRITE UR QL STRIP: NEGATIVE
PH UR STRIP: 6 [PH] (ref 5–8)
PROT UR QL STRIP: NEGATIVE
SP GR UR STRIP: 1.01 (ref 1–1.03)
URN SPEC COLLECT METH UR: ABNORMAL
UROBILINOGEN UR STRIP-ACNC: NEGATIVE EU/DL

## 2017-02-25 PROCEDURE — 81003 URINALYSIS AUTO W/O SCOPE: CPT

## 2017-02-25 PROCEDURE — 25000003 PHARM REV CODE 250: Performed by: INTERNAL MEDICINE

## 2017-02-25 PROCEDURE — 99222 1ST HOSP IP/OBS MODERATE 55: CPT | Mod: ,,, | Performed by: INTERNAL MEDICINE

## 2017-02-25 PROCEDURE — 63600175 PHARM REV CODE 636 W HCPCS: Performed by: INTERNAL MEDICINE

## 2017-02-25 PROCEDURE — 97802 MEDICAL NUTRITION INDIV IN: CPT

## 2017-02-25 PROCEDURE — 20600001 HC STEP DOWN PRIVATE ROOM

## 2017-02-25 PROCEDURE — 25000003 PHARM REV CODE 250: Performed by: STUDENT IN AN ORGANIZED HEALTH CARE EDUCATION/TRAINING PROGRAM

## 2017-02-25 RX ORDER — MORPHINE SULFATE 60 MG/1
180 TABLET, FILM COATED, EXTENDED RELEASE ORAL 3 TIMES DAILY PRN
Status: DISCONTINUED | OUTPATIENT
Start: 2017-02-25 | End: 2017-02-25

## 2017-02-25 RX ORDER — MORPHINE SULFATE 60 MG/1
180 TABLET, FILM COATED, EXTENDED RELEASE ORAL 3 TIMES DAILY
Status: DISCONTINUED | OUTPATIENT
Start: 2017-02-25 | End: 2017-02-27

## 2017-02-25 RX ADMIN — HYDROMORPHONE HYDROCHLORIDE 6 MG: 2 TABLET ORAL at 03:02

## 2017-02-25 RX ADMIN — RAMELTEON 8 MG: 8 TABLET, FILM COATED ORAL at 10:02

## 2017-02-25 RX ADMIN — MORPHINE SULFATE 200 MG: 100 TABLET, EXTENDED RELEASE ORAL at 01:02

## 2017-02-25 RX ADMIN — MORPHINE SULFATE 180 MG: 60 TABLET, EXTENDED RELEASE ORAL at 09:02

## 2017-02-25 RX ADMIN — HYDROMORPHONE HYDROCHLORIDE 6 MG: 2 TABLET ORAL at 06:02

## 2017-02-25 RX ADMIN — STANDARDIZED SENNA CONCENTRATE AND DOCUSATE SODIUM 1 TABLET: 8.6; 5 TABLET, FILM COATED ORAL at 09:02

## 2017-02-25 RX ADMIN — HYDROMORPHONE HYDROCHLORIDE 2 MG: 1 INJECTION, SOLUTION INTRAMUSCULAR; INTRAVENOUS; SUBCUTANEOUS at 10:02

## 2017-02-25 RX ADMIN — HYDROMORPHONE HYDROCHLORIDE 6 MG: 2 TABLET ORAL at 09:02

## 2017-02-25 RX ADMIN — POLYETHYLENE GLYCOL 3350 17 G: 17 POWDER, FOR SOLUTION ORAL at 08:02

## 2017-02-25 RX ADMIN — HYDROMORPHONE HYDROCHLORIDE 2 MG: 1 INJECTION, SOLUTION INTRAMUSCULAR; INTRAVENOUS; SUBCUTANEOUS at 12:02

## 2017-02-25 RX ADMIN — HYDROMORPHONE HYDROCHLORIDE 2 MG: 1 INJECTION, SOLUTION INTRAMUSCULAR; INTRAVENOUS; SUBCUTANEOUS at 02:02

## 2017-02-25 RX ADMIN — HYDROMORPHONE HYDROCHLORIDE 6 MG: 2 TABLET ORAL at 01:02

## 2017-02-25 RX ADMIN — MORPHINE SULFATE 180 MG: 60 TABLET, EXTENDED RELEASE ORAL at 01:02

## 2017-02-25 RX ADMIN — GABAPENTIN 300 MG: 300 CAPSULE ORAL at 12:02

## 2017-02-25 RX ADMIN — VITAMIN D, TAB 1000IU (100/BT) 5000 UNITS: 25 TAB at 08:02

## 2017-02-25 RX ADMIN — HYDROMORPHONE HYDROCHLORIDE 2 MG: 1 INJECTION, SOLUTION INTRAMUSCULAR; INTRAVENOUS; SUBCUTANEOUS at 04:02

## 2017-02-25 RX ADMIN — FOLIC ACID 1 MG: 1 TABLET ORAL at 08:02

## 2017-02-25 RX ADMIN — GABAPENTIN 300 MG: 300 CAPSULE ORAL at 06:02

## 2017-02-25 RX ADMIN — GABAPENTIN 300 MG: 300 CAPSULE ORAL at 01:02

## 2017-02-25 RX ADMIN — GABAPENTIN 300 MG: 300 CAPSULE ORAL at 09:02

## 2017-02-25 RX ADMIN — AMLODIPINE BESYLATE 2.5 MG: 2.5 TABLET ORAL at 08:02

## 2017-02-25 RX ADMIN — ENOXAPARIN SODIUM 40 MG: 100 INJECTION SUBCUTANEOUS at 12:02

## 2017-02-25 RX ADMIN — POLYETHYLENE GLYCOL 3350 17 G: 17 POWDER, FOR SOLUTION ORAL at 05:02

## 2017-02-25 RX ADMIN — POLYETHYLENE GLYCOL 3350 17 G: 17 POWDER, FOR SOLUTION ORAL at 12:02

## 2017-02-25 NOTE — ED NOTES
Patient identifiers verified and correct for Camilo Osei Johnson.    LOC: The patient is awake, alert and aware of environment with an appropriate affect, the patient is oriented x 3 and speaking appropriately.  APPEARANCE: Patient resting comfortably and in no acute distress, patient is clean and well groomed, patient's clothing is properly fastened.  SKIN: The skin is warm and dry, color consistent with ethnicity, patient has normal skin turgor and moist mucus membranes, skin intact, no breakdown or bruising noted. Port-a-cath noted to the left upper chest  MUSCULOSKELETAL: Patient moving all extremities spontaneously, no obvious swelling or deformities noted.  RESPIRATORY: Airway is open and patent, respirations are spontaneous, patient has a normal effort and rate, no accessory muscle use noted, clear bilateral breath sounds noted through out the chest.  CARDIAC: Patient is tachycardiac, no periphreal edema noted, capillary refill < 3 seconds.  ABDOMEN: Firm and  tender to palpation,  distention noted, normoactive bowel sounds present in all four quadrants.

## 2017-02-25 NOTE — H&P
[facility]  Hematology/Oncology  H&P    Patient Name: Camilo Johnson  MRN: [id number]  Admission Date: 2/24/2017  Code Status: Full Code   Attending Provider: Tony Hollingsworth MD  Primary Care Physician: Latoya Alarcon MD  Principal Problem:Uncontrolled pain    Subjective:     HPI: Ms. 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 Upon review of most recent clinic visit/patient emails with Dr. Zaman, patient should currently be taking morphine 12-hr 130mg PO q8hr with hydromorphone 4mg PO q6hr PRN for breakthrough pain. She states that she currently is taking morphine 12-hr 160mg PO TID with hydromorphone 4mg PO q4hr PRN.     Oncologic history:  Former patient of Dr. Fontenot followed with Dr. Zaman in clinic. Diagnosed with pancreatic cancer of the tail with biopsy and celiac neurolysis performed at [facility] 03/22/16. Pathology was consistent with poorly differentiated adenocarcinoma of the pancreas; imaging demonstrated pulmonary nodules and subcapsular liver lesion. She was initially started on modified FOLFIRINOX; tumors were slightly more prominent on repeat scans but did not fulfill criteria for disease progression. She was therefore started on nabpaclitaxel and gemcitabine (s/p day 2 cycle 8 on 02/17/17; was due for day 3 02/24/17).        Oncology Treatment Plan:   OP ABRAXANE + GEMCITABINE    Medications:  Continuous Infusions:   Scheduled Meds:   [START ON 2/25/2017] amlodipine  2.5 mg Oral Daily    [START ON 2/25/2017] enoxaparin  40 mg Subcutaneous Daily    [START ON 2/25/2017] folic acid  1 mg Oral Daily    [START ON 2/25/2017] gabapentin  300 mg Oral TID    [START ON 2/25/2017] morphine  200 mg Oral Q12H    [START ON 2/25/2017] polyethylene glycol  17 g Oral BID    [START ON 2/25/2017] vitamin D  5,000 Units Oral Daily     PRN  Meds:[START ON 2017] HYDROmorphone, [START ON 2017] HYDROmorphone, [START ON 2017] ondansetron, [START ON 2017] ondansetron, [START ON 2017] promethazine, ramelteon, [START ON 2017] senna-docusate 8.6-50 mg     Review of patient's allergies indicates:  No Known Allergies     Past Medical History:   Diagnosis Date    Cancer associated pain 2017    Chemotherapy follow-up examination 2016    HTN (hypertension)     Pancreatic mass     Paronychia 2017    Sickle cell trait     SOB (shortness of breath) 2017    Swelling of lower extremity 2017     Past Surgical History:   Procedure Laterality Date    BREAST SURGERY      breast reduction     SECTION      COLONOSCOPY N/A 2017    Procedure: COLONOSCOPY;  Surgeon: Elvis Stein MD;  Location: [facility];  Service: Endoscopy;  Laterality: N/A;    TUBAL LIGATION       Family History     Problem Relation (Age of Onset)    Diabetes Mother, Brother    Heart disease Father    Hypertension Father, Brother, Brother    Stroke Father        Social History Main Topics    Smoking status: Never Smoker    Smokeless tobacco: Never Used    Alcohol use Yes      Comment: 1 glass once or twice per week    Drug use: No    Sexual activity: Yes     Partners: Male       Review of Systems   Constitutional: Positive for appetite change and fatigue. Negative for chills and fever.   HENT: Negative for sore throat and trouble swallowing.    Eyes: Negative for pain and visual disturbance.   Respiratory: Negative for cough and shortness of breath.    Cardiovascular: Negative for chest pain and palpitations.   Gastrointestinal: Positive for abdominal pain and nausea. Negative for constipation and vomiting.   Genitourinary: Negative for difficulty urinating and dysuria.   Musculoskeletal: Negative for arthralgias and myalgias.   Skin: Negative for rash and wound.   Neurological: Negative for weakness, numbness and  headaches.     Objective:     Vital Signs (Most Recent):  Temp: 98.7 °F (37.1 °C) (02/24/17 1616)  Pulse: 110 (02/24/17 2035)  Resp: (!) 22 (02/24/17 2035)  BP: (!) 142/100 (02/24/17 2035)  SpO2: 99 % (02/24/17 2035) Vital Signs (24h Range):  Temp:  [98.6 °F (37 °C)-98.7 °F (37.1 °C)] 98.7 °F (37.1 °C)  Pulse:  [110-121] 110  Resp:  [16-22] 22  SpO2:  [97 %-99 %] 99 %  BP: (124-142)/() 142/100     Weight: 63.5 kg (140 lb)  Body mass index is 24.03 kg/(m^2).  Body surface area is 1.69 meters squared.    No intake or output data in the 24 hours ending 02/24/17 2340    Physical Exam   Constitutional: She is oriented to person, place, and time. She appears well-developed and well-nourished. No distress.   HENT:   Head: Normocephalic and atraumatic.   Mouth/Throat: Oropharynx is clear and moist.   Eyes: Conjunctivae are normal. Pupils are equal, round, and reactive to light.   Cardiovascular: Normal rate, regular rhythm, normal heart sounds and intact distal pulses.    Pulmonary/Chest: Effort normal and breath sounds normal.   Abdominal: Soft. Bowel sounds are normal. She exhibits distension (mildly distended). There is tenderness.   Musculoskeletal: She exhibits edema (trace BLE).   Neurological: She is alert and oriented to person, place, and time.   Skin: Skin is warm and dry. No rash noted.   Vitals reviewed.    Significant Labs:   Recent Results (from the past 24 hour(s))   CBC W/ AUTO DIFFERENTIAL    Collection Time: 02/24/17  7:19 PM   Result Value Ref Range    WBC 6.07 3.90 - 12.70 K/uL    RBC 4.26 4.00 - 5.40 M/uL    Hemoglobin 11.7 (L) 12.0 - 16.0 g/dL    Hematocrit 34.6 (L) 37.0 - 48.5 %    MCV 81 (L) 82 - 98 fL    MCH 27.5 27.0 - 31.0 pg    MCHC 33.8 32.0 - 36.0 %    RDW 16.5 (H) 11.5 - 14.5 %    Platelets 208 150 - 350 K/uL    MPV 9.4 9.2 - 12.9 fL    Gran # 3.9 1.8 - 7.7 K/uL    Lymph # 1.5 1.0 - 4.8 K/uL    Mono # 0.5 0.3 - 1.0 K/uL    Eos # 0.1 0.0 - 0.5 K/uL    Baso # 0.02 0.00 - 0.20 K/uL    Gran%  63.4 38.0 - 73.0 %    Lymph% 25.2 18.0 - 48.0 %    Mono% 7.9 4.0 - 15.0 %    Eosinophil% 2.0 0.0 - 8.0 %    Basophil% 0.3 0.0 - 1.9 %    Differential Method Automated    Comp. Metabolic Panel    Collection Time: 02/24/17  7:19 PM   Result Value Ref Range    Sodium 135 (L) 136 - 145 mmol/L    Potassium 4.0 3.5 - 5.1 mmol/L    Chloride 100 95 - 110 mmol/L    CO2 23 23 - 29 mmol/L    Glucose 94 70 - 110 mg/dL    BUN, Bld 6 6 - 20 mg/dL    Creatinine 0.5 0.5 - 1.4 mg/dL    Calcium 9.4 8.7 - 10.5 mg/dL    Total Protein 7.2 6.0 - 8.4 g/dL    Albumin 2.9 (L) 3.5 - 5.2 g/dL    Total Bilirubin 0.7 0.1 - 1.0 mg/dL    Alkaline Phosphatase 83 55 - 135 U/L    AST 22 10 - 40 U/L    ALT 13 10 - 44 U/L    Anion Gap 12 8 - 16 mmol/L    eGFR if African American >60.0 >60 mL/min/1.73 m^2    eGFR if non African American >60.0 >60 mL/min/1.73 m^2     Diagnostic Results:  X-Ray KUB 02/24/17:  Pending.    Assessment/Plan:     * Uncontrolled pain  - Patient with uncontrolled pain on regimen of morphine 12-hr 160mg PO q8hr, hydromorphone 4mg PO q4hr PRN (taking 6 times daily) at home.  - OME equivalent of 576mg in 24hr period as per home regimen. Required additional hydromorphone 2mg IV x 4 while in ED.   - With additional ED doses and dose adjustment, 24 hour oral morphine equivalent ~600. Will divide this as 2/3 long-acting and 1/3 short-acting.  - Will therefore adjust regimen to morphine 12-hr 200mg PO q12hr, hydromorphone 6mg PO q4hr PRN for breakthrough with hydromorphone 2mg IV q2hr PRN as rescue dose for pain not controlled by oral medications.  - Will monitor response and follow OME over 24hr periods for further adjustments.    Essential hypertension  - Continuing amlodipine 2.5mg PO daily.    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 Zaman in clinic.  - Suspect current pain secondary to malignancy.  - Active chemotherapy with  nabpaclitaxel and gemcitabine (s/p day 2 cycle 8 on 02/17/17; was due for day 3 02/24/17).   - Will need to reschedule next cycle after pain better controlled.    Sickle cell trait  - No acute issues at this time. Lower suspicion for sickle cell trait as primary cause of worsening pain.     Cancer associated pain  - As under uncontrolled pain.  - Continuing gabapentin 300mg PO TID as adjunct pain control.    Staff attestation to follow.    BROOKE Lee MD   PGY-2   [phone]    I approve this H&P.

## 2017-02-25 NOTE — ED TRIAGE NOTES
Patient came in with a c/o uncontrollable pain. Patient sent from infusion clinic for pain control.

## 2017-02-25 NOTE — PLAN OF CARE
Problem: Patient Care Overview  Goal: Plan of Care Review  Outcome: Ongoing (interventions implemented as appropriate)  Patient complains of constant pain to abdomen radiating to back. Scheduled MSContin given along with PRN PO dilaudid x2 & PRN IV dilaudid x1. Ambulates independently in room. Patient remains free from falls.  Fall precautions in place.  Bed in low position, wheels locked, side rails up x2, nonskid socks on, and call light within reach.  Patient encouraged to call for assistance with ambulation if needed. Fair appetite. Purposeful rounding done hourly. Will continue to monitor. Marsha Puga 2/25/2017 4:35 PM

## 2017-02-25 NOTE — CONSULTS
[facility]  Adult Nutrition  Consult Note    SUMMARY     Recommendations  Recommendation/Intervention:   1. Encourage increased PO intake.   2. Recommend adding Boost Plus OS to all meals to increase calorie and protein intake.   RD to monitor.    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 Patient reports she has a fair appetite and PO intake since admit. Nutrition D/C Planning: adequate nutrition via PO diet.    Nutrition Prescription Ordered  Current Diet Order: Regular       Nutrition Risk Screen   Nutrition Risk Screen: reduced oral intake over the last month    Nutrition/Diet History  Patient Reported Diet/Restrictions/Preferences: general  Typical Food/Fluid Intake: Patient reports fair appetite and PO intake PTA.  Food Preferences: No cultural or Buddhist needs identified at this time.  Factors Affecting Nutritional Intake: decreased appetite, pain     Labs/Tests/Procedures/Meds   Pertinent Labs Reviewed: reviewed  Pertinent Labs Comments: Na 135, Alb 2.9  Pertinent Medications Reviewed: reviewed  Pertinent Medications Comments: folic acid, vitamin D    Physical Findings  Overall Physical Appearance:  (nourished)  Tubes:  (none)  Oral/Mouth Cavity: WDL  Skin: intact    Anthropometrics  Height (inches): 64.02 in  Weight Method: Bed Scale  Weight (kg): 65.8 kg   Ideal Body Weight (IBW), Female: 120.1 lb   % Ideal Body Weight, Female (lb): 120.78 lb  BMI (kg/m2): 24.89  BMI Grade: 18.5-24.9 - normal  Usual Body Weight (UBW), k.1 kg  % Usual Body Weight: 88.8   Weight Loss: unintentional     Estimated/Assessed  Needs  Weight Used For Calorie Calculations: 65.8 kg (145 lb 1 oz)   Height (cm): 162.6 cm   Energy Need Method: Kcal/kg (1025-9751 kcal/day (30-35 kcal/kg))   RMR (Franklin-St. Jeor Equation): 1266.59   Weight Used For Protein Calculations: 65.8 kg (145 lb 1 oz)  Protein Requirements: 79-92 g/day (1.2-1.4 g/kg)  Fluid Need Method: RDA Method (1 mL/kcal or per MD)     Monitor and Evaluation  Food and Nutrient Intake: energy intake, food and beverage intake  Food and Nutrient Adminstration: diet order   Physical Activity and Function: nutrition-related ADLs and IADLs  Anthropometric Measurements: weight change  Biochemical Data, Medical Tests and Procedures: electrolyte and renal panel, gastrointestinal profile  Nutrition-Focused Physical Findings: overall appearance    Nutrition Risk  Level of Risk: high    Nutrition Follow-Up  RD Follow-up?: Yes    Nutrition Diagnosis  Problem: Increased nutrient needs  Etiology: increased demand for calories and protein  Signs/Symptoms: diagnosis of metastatic cancer  Nutrition Diagnosis Status: New

## 2017-02-25 NOTE — NURSING
Patient arrives to unit at this time via stretcher and walks to the bed with assistance.  No skin breakdown noted.  Fall precautions in place.  Patient instructed to call for assistance.  Patient complains of pain 9/10 to her abdomen at this time.  Will continue to monitor.

## 2017-02-25 NOTE — SUBJECTIVE & OBJECTIVE
Oncology Treatment Plan:   OP ABRAXANE + GEMCITABINE    Medications:  Continuous Infusions:   Scheduled Meds:   [START ON 2017] amlodipine  2.5 mg Oral Daily    [START ON 2017] enoxaparin  40 mg Subcutaneous Daily    [START ON 2017] folic acid  1 mg Oral Daily    [START ON 2017] gabapentin  300 mg Oral TID    [START ON 2017] morphine  200 mg Oral Q12H    [START ON 2017] polyethylene glycol  17 g Oral BID    [START ON 2017] vitamin D  5,000 Units Oral Daily     PRN Meds:[START ON 2017] HYDROmorphone, [START ON 2017] HYDROmorphone, [START ON 2017] ondansetron, [START ON 2017] ondansetron, [START ON 2017] promethazine, ramelteon, [START ON 2017] senna-docusate 8.6-50 mg     Review of patient's allergies indicates:  No Known Allergies     Past Medical History:   Diagnosis Date    Cancer associated pain 2017    Chemotherapy follow-up examination 2016    HTN (hypertension)     Pancreatic mass     Paronychia 2017    Sickle cell trait     SOB (shortness of breath) 2017    Swelling of lower extremity 2017     Past Surgical History:   Procedure Laterality Date    BREAST SURGERY      breast reduction     SECTION      COLONOSCOPY N/A 2017    Procedure: COLONOSCOPY;  Surgeon: Elvis Stein MD;  Location: [facility];  Service: Endoscopy;  Laterality: N/A;    TUBAL LIGATION       Family History     Problem Relation (Age of Onset)    Diabetes Mother, Brother    Heart disease Father    Hypertension Father, Brother, Brother    Stroke Father        Social History Main Topics    Smoking status: Never Smoker    Smokeless tobacco: Never Used    Alcohol use Yes      Comment: 1 glass once or twice per week    Drug use: No    Sexual activity: Yes     Partners: Male       Review of Systems   Constitutional: Positive for appetite change and fatigue. Negative for chills and fever.   HENT: Negative for sore  throat and trouble swallowing.    Eyes: Negative for pain and visual disturbance.   Respiratory: Negative for cough and shortness of breath.    Cardiovascular: Negative for chest pain and palpitations.   Gastrointestinal: Positive for abdominal pain and nausea. Negative for constipation and vomiting.   Genitourinary: Negative for difficulty urinating and dysuria.   Musculoskeletal: Negative for arthralgias and myalgias.   Skin: Negative for rash and wound.   Neurological: Negative for weakness, numbness and headaches.     Objective:     Vital Signs (Most Recent):  Temp: 98.7 °F (37.1 °C) (02/24/17 1616)  Pulse: 110 (02/24/17 2035)  Resp: (!) 22 (02/24/17 2035)  BP: (!) 142/100 (02/24/17 2035)  SpO2: 99 % (02/24/17 2035) Vital Signs (24h Range):  Temp:  [98.6 °F (37 °C)-98.7 °F (37.1 °C)] 98.7 °F (37.1 °C)  Pulse:  [110-121] 110  Resp:  [16-22] 22  SpO2:  [97 %-99 %] 99 %  BP: (124-142)/() 142/100     Weight: 63.5 kg (140 lb)  Body mass index is 24.03 kg/(m^2).  Body surface area is 1.69 meters squared.    No intake or output data in the 24 hours ending 02/24/17 2340    Physical Exam   Constitutional: She is oriented to person, place, and time. She appears well-developed and well-nourished. No distress.   HENT:   Head: Normocephalic and atraumatic.   Mouth/Throat: Oropharynx is clear and moist.   Eyes: Conjunctivae are normal. Pupils are equal, round, and reactive to light.   Cardiovascular: Normal rate, regular rhythm, normal heart sounds and intact distal pulses.    Pulmonary/Chest: Effort normal and breath sounds normal.   Abdominal: Soft. Bowel sounds are normal. She exhibits distension (mildly distended). There is tenderness.   Musculoskeletal: She exhibits edema (trace BLE).   Neurological: She is alert and oriented to person, place, and time.   Skin: Skin is warm and dry. No rash noted.   Vitals reviewed.    Significant Labs:   Recent Results (from the past 24 hour(s))   CBC W/ AUTO DIFFERENTIAL     Collection Time: 02/24/17  7:19 PM   Result Value Ref Range    WBC 6.07 3.90 - 12.70 K/uL    RBC 4.26 4.00 - 5.40 M/uL    Hemoglobin 11.7 (L) 12.0 - 16.0 g/dL    Hematocrit 34.6 (L) 37.0 - 48.5 %    MCV 81 (L) 82 - 98 fL    MCH 27.5 27.0 - 31.0 pg    MCHC 33.8 32.0 - 36.0 %    RDW 16.5 (H) 11.5 - 14.5 %    Platelets 208 150 - 350 K/uL    MPV 9.4 9.2 - 12.9 fL    Gran # 3.9 1.8 - 7.7 K/uL    Lymph # 1.5 1.0 - 4.8 K/uL    Mono # 0.5 0.3 - 1.0 K/uL    Eos # 0.1 0.0 - 0.5 K/uL    Baso # 0.02 0.00 - 0.20 K/uL    Gran% 63.4 38.0 - 73.0 %    Lymph% 25.2 18.0 - 48.0 %    Mono% 7.9 4.0 - 15.0 %    Eosinophil% 2.0 0.0 - 8.0 %    Basophil% 0.3 0.0 - 1.9 %    Differential Method Automated    Comp. Metabolic Panel    Collection Time: 02/24/17  7:19 PM   Result Value Ref Range    Sodium 135 (L) 136 - 145 mmol/L    Potassium 4.0 3.5 - 5.1 mmol/L    Chloride 100 95 - 110 mmol/L    CO2 23 23 - 29 mmol/L    Glucose 94 70 - 110 mg/dL    BUN, Bld 6 6 - 20 mg/dL    Creatinine 0.5 0.5 - 1.4 mg/dL    Calcium 9.4 8.7 - 10.5 mg/dL    Total Protein 7.2 6.0 - 8.4 g/dL    Albumin 2.9 (L) 3.5 - 5.2 g/dL    Total Bilirubin 0.7 0.1 - 1.0 mg/dL    Alkaline Phosphatase 83 55 - 135 U/L    AST 22 10 - 40 U/L    ALT 13 10 - 44 U/L    Anion Gap 12 8 - 16 mmol/L    eGFR if African American >60.0 >60 mL/min/1.73 m^2    eGFR if non African American >60.0 >60 mL/min/1.73 m^2     Diagnostic Results:  X-Ray KUB 02/24/17:  Pending.

## 2017-02-25 NOTE — ASSESSMENT & PLAN NOTE
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 Zaman in clinic.  - Suspect current pain secondary to malignancy.  - Active chemotherapy with nabpaclitaxel and gemcitabine (s/p day 2 cycle 8 on 02/17/17; was due for day 3 02/24/17).   - Will need to reschedule next cycle after pain better controlled.

## 2017-02-25 NOTE — ASSESSMENT & PLAN NOTE
- No acute issues at this time. Lower suspicion for sickle cell trait as primary cause of worsening pain.

## 2017-02-25 NOTE — PLAN OF CARE
Problem: Patient Care Overview  Goal: Plan of Care Review  Outcome: Ongoing (interventions implemented as appropriate)  Patient progressing towards discharge.  Patient had no acute events noted throughout the night at this time.  Instructed patient to take oral pain medication prior to taking IV pain medication.  Patient voiced compliance.  Will continue to monitor.

## 2017-02-25 NOTE — ASSESSMENT & PLAN NOTE
- Patient with uncontrolled pain on regimen of morphine 12-hr 160mg PO q8hr, hydromorphone 4mg PO q4hr PRN (taking 6 times daily) at home.  - OME equivalent of 576mg in 24hr period as per home regimen. Required additional hydromorphone 2mg IV x 4 while in ED.   - With additional ED doses and dose adjustment, 24 hour oral morphine equivalent ~600. Will divide this as 2/3 long-acting and 1/3 short-acting.  - Will therefore adjust regimen to morphine 12-hr 200mg PO q12hr, hydromorphone 6mg PO q4hr PRN for breakthrough with hydromorphone 2mg IV q2hr PRN as rescue dose for pain not controlled by oral medications.  - Will monitor response and follow OME over 24hr periods for further adjustments.

## 2017-02-25 NOTE — ED PROVIDER NOTES
Encounter Date: 2017       History     Chief Complaint   Patient presents with    Abdominal Pain     Sent from chemo infusion for admission to hospital for pain control. She last received chemo 1 week ago.      Review of patient's allergies indicates:  No Known Allergies    HPI Comments: 52 year old female with PMH significant for HTN, metastatic pancreatic cancer (lung and bone), and hx of bowel obstruction requiring colonic stent presents to the ED with intractable abdominal and back pain. Patient reports that she has chronic abdominal and back secondary to her metastatic cancer, but it has been worsening in intensity for the last week. Patient localizes her pain to her entire abdomen and her lower back. Patient is currently on treatment with gemcitabine and nab paclitaxel. Patient reports that her last treatment was 1 week ago. Patient reports that she was supposed to her chemotherapy today with Dr. Zaman but reports that her treatment was postponed secondary to her intractable pain. Patient denies any fevers, chills, sore throat, cough, chest pain, shortness of breath, diarrhea, hematochezia, dysuria, or hematuria.     Of note, patient reports of having a non-bloody, normal consistency bowel movement today.     Patient reports of recent escalation in her pain regimen from MS Contin 100 mg TID to MS contin 160 mg TID. Patient also reports of taking her PRN Dilaudid 4 mg PO every 4 hours recently.     The history is provided by the patient and the spouse. No  was used.     Past Medical History:   Diagnosis Date    Cancer associated pain 2017    Chemotherapy follow-up examination 2016    HTN (hypertension)     Pancreatic mass     Paronychia 2017    Sickle cell trait     SOB (shortness of breath) 2017    Swelling of lower extremity 2017     Past Surgical History:   Procedure Laterality Date    BREAST SURGERY      breast reduction     SECTION       COLONOSCOPY N/A 1/30/2017    Procedure: COLONOSCOPY;  Surgeon: Elvis Stein MD;  Location: [facility];  Service: Endoscopy;  Laterality: N/A;    TUBAL LIGATION       Family History   Problem Relation Age of Onset    Diabetes Mother     Stroke Father     Hypertension Father     Heart disease Father     Hypertension Brother     Diabetes Brother     Hypertension Brother      Social History   Substance Use Topics    Smoking status: Never Smoker    Smokeless tobacco: Never Used    Alcohol use Yes      Comment: 1 glass once or twice per week     Review of Systems   Constitutional: Positive for activity change. Negative for chills and fever.   HENT: Negative for sore throat.    Eyes: Negative for visual disturbance.   Respiratory: Negative for cough and shortness of breath.    Cardiovascular: Negative for chest pain.   Gastrointestinal: Positive for abdominal distention and abdominal pain. Negative for blood in stool, constipation and diarrhea.   Endocrine: Negative for polyuria.   Genitourinary: Negative for dysuria and hematuria.   Musculoskeletal: Positive for back pain. Negative for gait problem.   Skin: Negative for rash.   Allergic/Immunologic: Positive for immunocompromised state.   Neurological: Negative for dizziness, syncope and light-headedness.   Psychiatric/Behavioral: Negative for confusion.       Physical Exam   Initial Vitals   BP Pulse Resp Temp SpO2   02/24/17 1616 02/24/17 1616 02/24/17 1616 02/24/17 1616 02/24/17 1616   124/82 117 16 98.7 °F (37.1 °C) 97 %     Physical Exam    Vitals reviewed.  Constitutional: She appears well-developed and well-nourished. She is not diaphoretic. She appears distressed.   HENT:   Head: Normocephalic and atraumatic.   Right Ear: External ear normal.   Left Ear: External ear normal.   Nose: Nose normal.   Mouth/Throat: Oropharynx is clear and moist. No oropharyngeal exudate.   Eyes: Conjunctivae and EOM are normal. Pupils are equal, round, and reactive  to light. Right eye exhibits no discharge. Left eye exhibits no discharge. No scleral icterus.   Neck: Normal range of motion. Neck supple. No thyromegaly present. No tracheal deviation present.   Cardiovascular: Regular rhythm, normal heart sounds and intact distal pulses. Tachycardia present.  Exam reveals no gallop and no friction rub.    No murmur heard.  Pulmonary/Chest: Breath sounds normal. No respiratory distress. She has no wheezes. She has no rhonchi. She has no rales.   Abdominal: Soft. She exhibits distension. Bowel sounds are decreased. There is generalized tenderness.   Musculoskeletal: Normal range of motion. She exhibits no tenderness.        Right lower leg: She exhibits edema.        Left lower leg: She exhibits edema.   Neurological: She is alert and oriented to person, place, and time. She has normal strength. No cranial nerve deficit.   Skin: Skin is warm and dry. No rash noted. No erythema. No pallor.   Psychiatric: She has a normal mood and affect. Her behavior is normal. Judgment and thought content normal.         ED Course   Procedures  Labs Reviewed   CBC W/ AUTO DIFFERENTIAL - Abnormal; Notable for the following:        Result Value    Hemoglobin 11.7 (*)     Hematocrit 34.6 (*)     MCV 81 (*)     RDW 16.5 (*)     All other components within normal limits   COMPREHENSIVE METABOLIC PANEL - Abnormal; Notable for the following:     Sodium 135 (*)     Albumin 2.9 (*)     All other components within normal limits   URINALYSIS                   APC / Resident Notes:   7:28 PM 52 year old female with PMH significant for HTN, metastatic pancreatic cancer (lung and bone), and hx of bowel obstruction requiring colonic stent presents to the ED with intractable abdominal and back pain. Differential diagnosis includes but is not limited to: malignancy induced pain, narcotic ileus, or small bowel obstruction. CT abdomen pelvis with contrast performed in late January. Will defer additional CT at this  time given that the patient's pain is chronic and the same quality but just increased in intensity. Ordered for basic labs, KUB, and IV pain medications. Will consult Hematology/Oncology for further recommendations.     Michael Carrero MD  PGY-3    8:13 PM Patient reports that Dilaudid 2 mg IV X 1 provided some pain relief but continues to report of being in pain. Additional dilaudid 2 mg IV X 1 ordered.     Michael Carrero MD  PGY-3    9:42 PM Discussed with Heme/Onc, patient's case to be discussed with staff Heme/Onc personnel.     Michael Carrero MD  PGY-3     10:03 PM Additional 2 mg of IV dilaudid administered. Patient to be admitted by Heme/Onc    Michael Carrero MD  PGY-3         Attending Attestation:   Physician Attestation Statement for Resident:  As the supervising MD   Physician Attestation Statement: I have personally seen and examined this patient.   I agree with the above history. -:   As the supervising MD I agree with the above PE.    As the supervising MD I agree with the above treatment, course, plan, and disposition.            Attending ED Notes:   11:24 PM pain improved but not enough for pt to feel comfortable  Going home.  D/w onc fellow - will admit for iv pain control.   Pt/fam updated.           ED Course     Clinical Impression:   The primary encounter diagnosis was Intractable pain. Diagnoses of Cancer associated pain and Pancreatic cancer metastasized to liver were also pertinent to this visit.    Disposition:   Disposition: Admitted  Condition: Stable       Tony Hollingsworth MD  02/24/17 [id number]

## 2017-02-25 NOTE — PROGRESS NOTES
Progress Note  Hematology/Oncology    Patient Name: Camilo Johnson  YOB: 1965    Admit Date: 2/24/2017                     LOS: 1    SUBJECTIVE:     Reason for Admission:  Uncontrolled pain    Ms. 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 Upon review of most recent clinic visit/patient emails with Dr. Zaman, patient should currently be taking morphine 12-hr 130mg PO q8hr with hydromorphone 4mg PO q6hr PRN for breakthrough pain. She states that she currently is taking morphine 12-hr 160mg PO TID with hydromorphone 4mg PO q4hr PRN.     Oncology History  -Diagnosed with pancreatic tail cancer 03/22/16 (adenocarcinoma) with pulmonary nodules and subcaosular liver lesions  -on FolFirinox and then now on nabpaclitaxel and gemcitabine (due for day 3 of cycle 8 yesterday).        Oncology Treatment Plan:   OP ABRAXANE + GEMCITABINE    Interval history: When patient got to the floor yesterday, she was complaining of 9/10 abdominal pain.  This morning, pain has improved with her new pain regiment.   She was sleeping comfortably throughout the night.    Review of Systems   Constitutional: Positive for appetite change and fatigue. Negative for chills and fever.   HENT: Negative for sore throat and trouble swallowing.   Eyes: Negative for pain and visual disturbance.   Respiratory: Negative for cough and shortness of breath.   Cardiovascular: Negative for chest pain and palpitations.   Gastrointestinal: Positive for abdominal pain and nausea. Negative for constipation and vomiting.   Genitourinary: Negative for difficulty urinating and dysuria.   Musculoskeletal: Negative for arthralgias and myalgias.   Skin: Negative for rash and wound.   Neurological: Negative for weakness, numbness and headaches.   OBJECTIVE:     Vital Signs Range (Last 24H):  Temp:  [98.2 °F (36.8 °C)-98.7  °F (37.1 °C)]   Pulse:  [110-121]   Resp:  [16-24]   BP: (124-146)/()   SpO2:  [96 %-99 %] Body mass index is 24.9 kg/(m^2).    I & O (Last 24H):    Intake/Output Summary (Last 24 hours) at 02/25/17 0648  Last data filed at 02/25/17 0500   Gross per 24 hour   Intake              180 ml   Output                0 ml   Net              180 ml     Physical Exam   Constitutional: She is oriented to person, place, and time. She appears well-developed and well-nourished. No distress.   HENT:   Head: Normocephalic and atraumatic.   Mouth/Throat: Oropharynx is clear and moist.   Eyes: Conjunctivae are normal. Pupils are equal, round, and reactive to light.   Cardiovascular: Normal rate, regular rhythm, normal heart sounds and intact distal pulses.   Pulmonary/Chest: Effort normal and breath sounds normal.   Abdominal: Soft. Bowel sounds are normal. She exhibits distension (mildly distended). There is tenderness.   Musculoskeletal: She exhibits edema (trace BLE).   Neurological: She is alert and oriented to person, place, and time.   Skin: Skin is warm and dry. No rash noted.   Vitals reviewed.    Medications:  Scheduled:   amlodipine  2.5 mg Oral Daily    enoxaparin  40 mg Subcutaneous Daily    folic acid  1 mg Oral Daily    gabapentin  300 mg Oral TID    morphine  200 mg Oral Q12H    polyethylene glycol  17 g Oral BID    vitamin D  5,000 Units Oral Daily       Infusions:       PRN:  HYDROmorphone, HYDROmorphone, ondansetron, ondansetron, promethazine, ramelteon, senna-docusate 8.6-50 mg    Diagnostic Results:  Lab Results   Component Value Date    WBC 6.07 02/24/2017    HGB 11.7 (L) 02/24/2017    HCT 34.6 (L) 02/24/2017    MCV 81 (L) 02/24/2017     02/24/2017       Recent Labs  Lab 02/24/17  1919   GLU 94   *   K 4.0      CO2 23   BUN 6   CREATININE 0.5   CALCIUM 9.4     Lab Results   Component Value Date    INR 1.1 01/26/2017    INR 1.0 05/27/2016    INR 1.0 04/09/2016     Lab Results    Component Value Date    HGBA1C 5.8 03/18/2016     No results for input(s): POCTGLUCOSE in the last 72 hours.      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     * Uncontrolled pain  - Patient with uncontrolled pain on regimen of morphine 12-hr 160mg PO q8hr, hydromorphone 4mg PO q4hr PRN (taking 6 times daily) at home.  - OME equivalent of 576mg in 24hr period as per home regimen. Required additional hydromorphone 2mg IV x 4 while in ED.   - With additional ED doses and dose adjustment, 24 hour oral morphine equivalent ~600. Will divide this as 2/3 long-acting and 1/3 short-acting.  - New regiment this AM: morphine 12-hr 180mg PO TID, hydromorphone 6mg PO q4hr PRN for breakthrough with hydromorphone 2mg IV q2hr PRN as rescue dose for pain not controlled by oral medications.  - Will monitor response and follow OME over 24hr periods for further adjustments.     Essential hypertension  - Continuing amlodipine 2.5mg PO daily.     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 Zaman in clinic.  - Suspect current pain secondary to malignancy.  - Active chemotherapy with nabpaclitaxel and gemcitabine (s/p day 2 cycle 8 on 02/17/17; was due for day 3 02/24/17).   - Will need to reschedule next cycle after pain better controlled.     Sickle cell trait  - No acute issues at this time. Lower suspicion for sickle cell trait as primary cause of worsening pain.      Cancer associated pain  - As under uncontrolled pain.  - Continuing gabapentin 300mg PO TID as adjunct pain  control.       Dispo: pending clinical improvement.    DVT: Enoxaparin 40mg  Diet: Adult Regular    Staff attestation to follow.    Val Ochoa  Internal Medicine, PGY1

## 2017-02-25 NOTE — PLAN OF CARE
Problem: Patient Care Overview  Goal: Plan of Care Review  Outcome: Ongoing (interventions implemented as appropriate)  Recommendations  1. Encourage increased PO intake.   2. Recommend adding Boost Plus OS to all meals to increase calorie and protein intake.      RD to monitor. Full assessment completed. See RD Consult Note 2/25/17.

## 2017-02-26 LAB
ALBUMIN SERPL BCP-MCNC: 2.8 G/DL
ALP SERPL-CCNC: 83 U/L
ALT SERPL W/O P-5'-P-CCNC: 13 U/L
ANION GAP SERPL CALC-SCNC: 7 MMOL/L
AST SERPL-CCNC: 20 U/L
BILIRUB SERPL-MCNC: 0.7 MG/DL
BUN SERPL-MCNC: 6 MG/DL
CALCIUM SERPL-MCNC: 9.3 MG/DL
CHLORIDE SERPL-SCNC: 98 MMOL/L
CO2 SERPL-SCNC: 30 MMOL/L
CREAT SERPL-MCNC: 0.6 MG/DL
EST. GFR  (AFRICAN AMERICAN): >60 ML/MIN/1.73 M^2
EST. GFR  (NON AFRICAN AMERICAN): >60 ML/MIN/1.73 M^2
GLUCOSE SERPL-MCNC: 102 MG/DL
POTASSIUM SERPL-SCNC: 4.5 MMOL/L
PROT SERPL-MCNC: 6.8 G/DL
SODIUM SERPL-SCNC: 135 MMOL/L

## 2017-02-26 PROCEDURE — 25000003 PHARM REV CODE 250: Performed by: STUDENT IN AN ORGANIZED HEALTH CARE EDUCATION/TRAINING PROGRAM

## 2017-02-26 PROCEDURE — 20600001 HC STEP DOWN PRIVATE ROOM

## 2017-02-26 PROCEDURE — 80074 ACUTE HEPATITIS PANEL: CPT

## 2017-02-26 PROCEDURE — 99231 SBSQ HOSP IP/OBS SF/LOW 25: CPT | Mod: ,,, | Performed by: INTERNAL MEDICINE

## 2017-02-26 PROCEDURE — 63600175 PHARM REV CODE 636 W HCPCS: Performed by: INTERNAL MEDICINE

## 2017-02-26 PROCEDURE — 36415 COLL VENOUS BLD VENIPUNCTURE: CPT

## 2017-02-26 PROCEDURE — 80053 COMPREHEN METABOLIC PANEL: CPT

## 2017-02-26 PROCEDURE — 25000003 PHARM REV CODE 250: Performed by: INTERNAL MEDICINE

## 2017-02-26 RX ORDER — HYDROMORPHONE HYDROCHLORIDE 2 MG/1
8 TABLET ORAL
Status: DISCONTINUED | OUTPATIENT
Start: 2017-02-26 | End: 2017-02-27

## 2017-02-26 RX ORDER — HYDROMORPHONE HYDROCHLORIDE 1 MG/ML
3 INJECTION, SOLUTION INTRAMUSCULAR; INTRAVENOUS; SUBCUTANEOUS
Status: DISCONTINUED | OUTPATIENT
Start: 2017-02-26 | End: 2017-03-07

## 2017-02-26 RX ADMIN — HYDROMORPHONE HYDROCHLORIDE 8 MG: 2 TABLET ORAL at 12:02

## 2017-02-26 RX ADMIN — VITAMIN D, TAB 1000IU (100/BT) 5000 UNITS: 25 TAB at 09:02

## 2017-02-26 RX ADMIN — MORPHINE SULFATE 180 MG: 60 TABLET, EXTENDED RELEASE ORAL at 09:02

## 2017-02-26 RX ADMIN — HYDROMORPHONE HYDROCHLORIDE 2 MG: 1 INJECTION, SOLUTION INTRAMUSCULAR; INTRAVENOUS; SUBCUTANEOUS at 08:02

## 2017-02-26 RX ADMIN — HYDROMORPHONE HYDROCHLORIDE 2 MG: 1 INJECTION, SOLUTION INTRAMUSCULAR; INTRAVENOUS; SUBCUTANEOUS at 04:02

## 2017-02-26 RX ADMIN — GABAPENTIN 300 MG: 300 CAPSULE ORAL at 05:02

## 2017-02-26 RX ADMIN — GABAPENTIN 300 MG: 300 CAPSULE ORAL at 02:02

## 2017-02-26 RX ADMIN — ENOXAPARIN SODIUM 40 MG: 100 INJECTION SUBCUTANEOUS at 12:02

## 2017-02-26 RX ADMIN — HYDROMORPHONE HYDROCHLORIDE 2 MG: 1 INJECTION, SOLUTION INTRAMUSCULAR; INTRAVENOUS; SUBCUTANEOUS at 03:02

## 2017-02-26 RX ADMIN — HYDROMORPHONE HYDROCHLORIDE 2 MG: 1 INJECTION, SOLUTION INTRAMUSCULAR; INTRAVENOUS; SUBCUTANEOUS at 06:02

## 2017-02-26 RX ADMIN — MORPHINE SULFATE 180 MG: 60 TABLET, EXTENDED RELEASE ORAL at 01:02

## 2017-02-26 RX ADMIN — HYDROMORPHONE HYDROCHLORIDE 3 MG: 1 INJECTION, SOLUTION INTRAMUSCULAR; INTRAVENOUS; SUBCUTANEOUS at 11:02

## 2017-02-26 RX ADMIN — FOLIC ACID 1 MG: 1 TABLET ORAL at 09:02

## 2017-02-26 RX ADMIN — POLYETHYLENE GLYCOL 3350 17 G: 17 POWDER, FOR SOLUTION ORAL at 09:02

## 2017-02-26 RX ADMIN — HYDROMORPHONE HYDROCHLORIDE 2 MG: 1 INJECTION, SOLUTION INTRAMUSCULAR; INTRAVENOUS; SUBCUTANEOUS at 11:02

## 2017-02-26 RX ADMIN — GABAPENTIN 300 MG: 300 CAPSULE ORAL at 09:02

## 2017-02-26 RX ADMIN — HYDROMORPHONE HYDROCHLORIDE 6 MG: 2 TABLET ORAL at 07:02

## 2017-02-26 RX ADMIN — STANDARDIZED SENNA CONCENTRATE AND DOCUSATE SODIUM 1 TABLET: 8.6; 5 TABLET, FILM COATED ORAL at 11:02

## 2017-02-26 RX ADMIN — HYDROMORPHONE HYDROCHLORIDE 2 MG: 1 INJECTION, SOLUTION INTRAMUSCULAR; INTRAVENOUS; SUBCUTANEOUS at 09:02

## 2017-02-26 RX ADMIN — HYDROMORPHONE HYDROCHLORIDE 8 MG: 2 TABLET ORAL at 06:02

## 2017-02-26 RX ADMIN — HYDROMORPHONE HYDROCHLORIDE 8 MG: 2 TABLET ORAL at 09:02

## 2017-02-26 RX ADMIN — AMLODIPINE BESYLATE 2.5 MG: 2.5 TABLET ORAL at 09:02

## 2017-02-26 RX ADMIN — MORPHINE SULFATE 180 MG: 60 TABLET, EXTENDED RELEASE ORAL at 05:02

## 2017-02-26 RX ADMIN — HYDROMORPHONE HYDROCHLORIDE 6 MG: 2 TABLET ORAL at 04:02

## 2017-02-26 NOTE — PLAN OF CARE
Problem: Patient Care Overview  Goal: Plan of Care Review  Outcome: Ongoing (interventions implemented as appropriate)  Pt is resting in bed and is up to the bathroom. VS stable. Pt noted to have flat affect. Pt's  visited at the bedside earlier in the shift. Pt states IV dilaudid works better for her than the PO. Explained to pt the importance of finding an efficient and effective PO pain management regimen for when she is discharged. Pt's left chest port remains patent, does not draw back. Port is currently saline locked. Bed is in lowest position, side rails up x 2. No skid socks in place. Encouraged pt to call for assistance as needed. Will continue to follow.

## 2017-02-26 NOTE — PROGRESS NOTES
Progress Note  Hematology/Oncology    Patient Name: Camilo Johnson  YOB: 1965    Admit Date: 2/24/2017                     LOS: 2    SUBJECTIVE:     Reason for Admission:  Uncontrolled pain    Interval history: Overnight, pain was better controlled. She had 2 episodes of 10/10 pain last night for which she received dilaudid for. She stated this relieved it. Patient remained afebrile but tachycardic.  She required 2 doses of the 6mg of dilaudid PRN 3 doses of dilaudid 2mg injection in last 24 hours. In addition, yesterday patient saw nutrition who recommended Boost Plus to increase calorie and protein intake.     Review of Systems   Constitutional: Positive for appetite change and fatigue. Negative for chills and fever.   HENT: Negative for sore throat and trouble swallowing.   Eyes: Negative for pain and visual disturbance.   Respiratory: Negative for cough and shortness of breath.   Cardiovascular: Negative for chest pain and palpitations.   Gastrointestinal: Positive for abdominal pain and nausea but has significantly improved on new regiment. Negative for constipation and vomiting.   Genitourinary: Negative for difficulty urinating and dysuria.   Musculoskeletal: Negative for arthralgias and myalgias.   Skin: Negative for rash and wound.   Neurological: Negative for weakness, numbness and headaches.   OBJECTIVE:     Vital Signs Range (Last 24H):  Temp:  [98.3 °F (36.8 °C)-98.7 °F (37.1 °C)]   Pulse:  [105-112]   Resp:  [14-17]   BP: (132-146)/(79-97)   SpO2:  [95 %-97 %] Body mass index is 24.9 kg/(m^2).    I & O (Last 24H):    Intake/Output Summary (Last 24 hours) at 02/26/17 0613  Last data filed at 02/25/17 1700   Gross per 24 hour   Intake              660 ml   Output              525 ml   Net              135 ml     Physical Exam   Constitutional: She is oriented to person, place, and time. She appears well-developed and well-nourished. No distress.   HENT:   Head: Normocephalic and  atraumatic.   Mouth/Throat: Oropharynx is clear and moist.   Eyes: Conjunctivae are normal. Pupils are equal, round, and reactive to light.   Cardiovascular: Normal rate, regular rhythm, normal heart sounds and intact distal pulses.   Pulmonary/Chest: Effort normal and breath sounds normal.   Abdominal: Soft. Bowel sounds are normal. She exhibits distension (mildly distended). There is tenderness.   Musculoskeletal: She exhibits edema (trace BLE).   Neurological: She is alert and oriented to person, place, and time.   Skin: Skin is warm and dry. No rash noted.   Vitals reviewed.    Medications:  Scheduled:   amlodipine  2.5 mg Oral Daily    enoxaparin  40 mg Subcutaneous Daily    folic acid  1 mg Oral Daily    gabapentin  300 mg Oral TID    morphine  180 mg Oral TID    polyethylene glycol  17 g Oral BID    vitamin D  5,000 Units Oral Daily       Infusions:       PRN:  HYDROmorphone, HYDROmorphone, ondansetron, ondansetron, promethazine, ramelteon, senna-docusate 8.6-50 mg    Diagnostic Results:  Lab Results   Component Value Date    WBC 6.07 02/24/2017    HGB 11.7 (L) 02/24/2017    HCT 34.6 (L) 02/24/2017    MCV 81 (L) 02/24/2017     02/24/2017     No results for input(s): GLU, NA, K, CL, CO2, BUN, CREATININE, CALCIUM, MG in the last 24 hours.  Lab Results   Component Value Date    INR 1.1 01/26/2017    INR 1.0 05/27/2016    INR 1.0 04/09/2016     Lab Results   Component Value Date    HGBA1C 5.8 03/18/2016     No results for input(s): POCTGLUCOSE in the last 72 hours.      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     * Uncontrolled pain  - Patient  with uncontrolled pain on regimen of morphine 12-hr 160mg PO q8hr, hydromorphone 4mg PO q4hr PRN (taking 6 times daily) at home.  - OME equivalent of 576mg in 24hr period as per home regimen. Required additional hydromorphone 2mg IV x 4 while in ED.   - With additional ED doses and dose adjustment, 24 hour oral morphine equivalent ~600. Will divide this as 2/3 long-acting and 1/3 short-acting.  - in last 24 hours, patient required 3 doses of hydromorphone 2mg IV q2hr and hydromorphone 6mg PO q4hr PRN   -based on her new increase of pain requirement last night, her new pain regiment will be morphine 12-hr 180mg PO TID, hydromorphone 8mg PO q4hr PRN for breakthrough with hydromorphone 2mg IV q2hr PRN      Essential hypertension  - Continuing amlodipine 2.5mg PO daily.     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 Zaman in clinic.  - Suspect current pain secondary to malignancy.  - Active chemotherapy with nabpaclitaxel and gemcitabine (s/p day 2 cycle 8 on 02/17/17; was due for day 3 02/24/17).   - Will need to reschedule next cycle after pain better controlled.     Sickle cell trait  - No acute issues at this time. Lower suspicion for sickle cell trait as primary cause of worsening pain.      Cancer associated pain  - As under uncontrolled pain.  - Continuing gabapentin 300mg PO TID as adjunct pain control.     Dispo: pending clinical improvement.    DVT: Enoxaparin 40mg  Diet: Adult Regular    Staff attestation to follow.    Val Ochoa  Internal Medicine, PGY1

## 2017-02-26 NOTE — PLAN OF CARE
Problem: Patient Care Overview  Goal: Plan of Care Review  Outcome: Ongoing (interventions implemented as appropriate)  POC reviewed with pt and spouse.  No falls or trauma this shift.  Pt c/o continuous abdominal pain.  Prn dilaudid POx2 and IV given x3, moderate relief obtained.  PO increased to 8 mg q3h.  Continues on MS Contin TID.  Pt denies nausea, denies SOB. VSS. Afebrile. Low bed, call light within reach,  at bedside, will continue to monitor.

## 2017-02-27 LAB
HAV IGM SERPL QL IA: NEGATIVE
HBV CORE IGM SERPL QL IA: NEGATIVE
HBV SURFACE AG SERPL QL IA: NEGATIVE
HCV AB SERPL QL IA: NEGATIVE

## 2017-02-27 PROCEDURE — 20600001 HC STEP DOWN PRIVATE ROOM

## 2017-02-27 PROCEDURE — 25000003 PHARM REV CODE 250: Performed by: INTERNAL MEDICINE

## 2017-02-27 PROCEDURE — 99231 SBSQ HOSP IP/OBS SF/LOW 25: CPT | Mod: ,,, | Performed by: INTERNAL MEDICINE

## 2017-02-27 PROCEDURE — 63600175 PHARM REV CODE 636 W HCPCS: Performed by: INTERNAL MEDICINE

## 2017-02-27 PROCEDURE — 25000003 PHARM REV CODE 250: Performed by: STUDENT IN AN ORGANIZED HEALTH CARE EDUCATION/TRAINING PROGRAM

## 2017-02-27 RX ORDER — HYDROMORPHONE HYDROCHLORIDE 2 MG/1
12 TABLET ORAL
Status: DISCONTINUED | OUTPATIENT
Start: 2017-02-27 | End: 2017-02-28

## 2017-02-27 RX ORDER — SIMETHICONE 80 MG
1 TABLET,CHEWABLE ORAL 3 TIMES DAILY PRN
Status: DISCONTINUED | OUTPATIENT
Start: 2017-02-27 | End: 2017-03-09 | Stop reason: HOSPADM

## 2017-02-27 RX ORDER — MORPHINE SULFATE 60 MG/1
240 TABLET, FILM COATED, EXTENDED RELEASE ORAL 3 TIMES DAILY
Status: DISCONTINUED | OUTPATIENT
Start: 2017-02-27 | End: 2017-03-01

## 2017-02-27 RX ORDER — LACTULOSE 10 G/15ML
10 SOLUTION ORAL EVERY 6 HOURS PRN
Status: DISCONTINUED | OUTPATIENT
Start: 2017-02-27 | End: 2017-03-02

## 2017-02-27 RX ADMIN — HYDROMORPHONE HYDROCHLORIDE 12 MG: 2 TABLET ORAL at 10:02

## 2017-02-27 RX ADMIN — GABAPENTIN 300 MG: 300 CAPSULE ORAL at 01:02

## 2017-02-27 RX ADMIN — POLYETHYLENE GLYCOL 3350 17 G: 17 POWDER, FOR SOLUTION ORAL at 09:02

## 2017-02-27 RX ADMIN — HYDROMORPHONE HYDROCHLORIDE 3 MG: 1 INJECTION, SOLUTION INTRAMUSCULAR; INTRAVENOUS; SUBCUTANEOUS at 03:02

## 2017-02-27 RX ADMIN — HYDROMORPHONE HYDROCHLORIDE 3 MG: 1 INJECTION, SOLUTION INTRAMUSCULAR; INTRAVENOUS; SUBCUTANEOUS at 11:02

## 2017-02-27 RX ADMIN — MORPHINE SULFATE 240 MG: 60 TABLET, EXTENDED RELEASE ORAL at 01:02

## 2017-02-27 RX ADMIN — ENOXAPARIN SODIUM 40 MG: 100 INJECTION SUBCUTANEOUS at 12:02

## 2017-02-27 RX ADMIN — FOLIC ACID 1 MG: 1 TABLET ORAL at 08:02

## 2017-02-27 RX ADMIN — GABAPENTIN 300 MG: 300 CAPSULE ORAL at 06:02

## 2017-02-27 RX ADMIN — MORPHINE SULFATE 240 MG: 60 TABLET, EXTENDED RELEASE ORAL at 09:02

## 2017-02-27 RX ADMIN — HYDROMORPHONE HYDROCHLORIDE 8 MG: 2 TABLET ORAL at 10:02

## 2017-02-27 RX ADMIN — VITAMIN D, TAB 1000IU (100/BT) 5000 UNITS: 25 TAB at 08:02

## 2017-02-27 RX ADMIN — LACTULOSE 10 G: 10 SOLUTION ORAL at 05:02

## 2017-02-27 RX ADMIN — MORPHINE SULFATE 180 MG: 60 TABLET, EXTENDED RELEASE ORAL at 06:02

## 2017-02-27 RX ADMIN — HYDROMORPHONE HYDROCHLORIDE 12 MG: 2 TABLET ORAL at 05:02

## 2017-02-27 RX ADMIN — GABAPENTIN 300 MG: 300 CAPSULE ORAL at 09:02

## 2017-02-27 RX ADMIN — HYDROMORPHONE HYDROCHLORIDE 3 MG: 1 INJECTION, SOLUTION INTRAMUSCULAR; INTRAVENOUS; SUBCUTANEOUS at 09:02

## 2017-02-27 RX ADMIN — AMLODIPINE BESYLATE 2.5 MG: 2.5 TABLET ORAL at 08:02

## 2017-02-27 RX ADMIN — SIMETHICONE CHEW TAB 80 MG 80 MG: 80 TABLET ORAL at 09:02

## 2017-02-27 RX ADMIN — POLYETHYLENE GLYCOL 3350 17 G: 17 POWDER, FOR SOLUTION ORAL at 08:02

## 2017-02-27 RX ADMIN — HYDROMORPHONE HYDROCHLORIDE 3 MG: 1 INJECTION, SOLUTION INTRAMUSCULAR; INTRAVENOUS; SUBCUTANEOUS at 01:02

## 2017-02-27 RX ADMIN — HYDROMORPHONE HYDROCHLORIDE 3 MG: 1 INJECTION, SOLUTION INTRAMUSCULAR; INTRAVENOUS; SUBCUTANEOUS at 12:02

## 2017-02-27 NOTE — PROGRESS NOTES
Mesha MCCALLUM called to say pt had an episode of large emesis.  Patient and I spoke about her pmhx of SBO 2/2 to mass effect from tumor.  She had a stent placed 3 weeks ago, which passed in her stool 2 weeks ago.  She was told by MD who placed the stent that she should be watchful for any signs of SBO.  IN light of no BMs since 2/25 and 1 episode of large emesis, KUB ordered.

## 2017-02-27 NOTE — PLAN OF CARE
Problem: Patient Care Overview  Goal: Plan of Care Review  Outcome: Ongoing (interventions implemented as appropriate)  POC reviewed with pt.  No falls or trauma this shift. Pt c/o continuous pain.  Pt did report she rested more comfortably last night, and was able to sleep.  PO dilaudid increased to 12 mg q3h prn. PO dilaudid administered x2.  IV dilaudid administered x2.  Moderate relief obtained. MScontin increased to 240 mg TID. Pt did c/o emesis x1.  MD notified.  KUB completed.  Lactulose ordered prn, 1 dose administered, awaiting BM. Pt's stomach distended, hypoactive BS, pt c/o abdominal discomfort.  KUB negative for obstruction. VSS. Afebrile. Low bed, call light within reach, will continue to monitor.

## 2017-02-27 NOTE — PLAN OF CARE
Problem: Patient Care Overview  Goal: Plan of Care Review  Outcome: Ongoing (interventions implemented as appropriate)  Pt is resting in bed and is up to the bathroom. Pt complains of nearly continuous abdominal pain. Pain medicine regimen augmented with heating packs, tolerated well. Pt's left chest port remains patent, port does not draw back but infuses very well. Pt's bed in lowest position, side rails up x 2. Pt encouraged to call for assistance as needed. Will continue to monitor.

## 2017-02-27 NOTE — PROGRESS NOTES
Admit Assessment    Patient Identification  Camilo Johnson   :  1965  Admit Date:  2017  Attending Provider:  Billy Du MD              Referral:   Patient was admitted to Medical Oncology Service with a diagnosis of Metastatic Pancreatic Cancer, and was admitted this hospital stay due to, Uncontrolled Pain,  Cancer associated pain [G89.3]  Pancreatic cancer metastasized to liver [C25.9, C78.7]  Intractable pain [R52].   See H&P for additional oncologic and medical history.    Oncology Social Worker is involved. Patient was referred to the Social Work Department via admission list/census. Patient presents as a 52 y.o. year old,  female.    Persons interviewed: Met with patient in her room this afternoon. Patient was alert, oriented, and cooperative.    Living Situation:    Lives With: Significant Other, Jef Johnson ([phone] cell), and their 7 y.o. Son.  Resides at [address] [place] [address].  Patient's phone: [phone] (cell).    Patient's 20 and 22 y.o. daughters are in college at [employer] in [place].     Patient has been ambulatory and able to perform ADLs.         Current or Past Agencies and Description of Services/Supplies    DME  Agency Name: N/A  Agency Phone Number: N/A       Home Health  Agency Name: N/A  Agency Phone Number: N/A  Services: None      IV Infusion  Agency Name: N/A  Agency Phone Number: N/A  Services/Supplies Provided: None      Nutrition: Oral      Outpatient Pharmacy:     [id number] - [place] - [address] AT [facility]  [address]  [place] [address]  Phone: [phone] Fax: [phone]    [facility] [place]  [place] - [address]  [address]  [place] [address]  Phone: [phone] Fax: [phone]      Patient Preference of agencies include: none specified.     Patient/Caregiver informed of right to  choose providers or agencies.  Patient provides permission to release any necessary information to [facility] and to Non[facility] agencies as needed to facilitate patient care, treatment planning, and patient discharge planning.  Written and verbal resources will be provided as needed/requested.      Coping  Appropriate to situation.          Adjustment to Diagnosis and Treatment  Appropriate; ongoing process.      Emotional/Behavioral/Cognitive Issues  None noted/observed.          History/Current Symptoms of Anxiety/Depression: No      History/Current Substance Use:   Social History     Social History Main Topics    Smoking status: Never Smoker    Smokeless tobacco: Never Used    Alcohol use Yes      Comment: 1 glass once or twice per week    Drug use: No    Sexual activity: Yes     Partners: Male       Indications of Abuse/Neglect: No  Abuse Screen  Do You Feel Unsafe at Home, Work or School?: no       Financial:  Payor/Plan Subscr  Sex Relation Sub. Ins. ID Effective Group Num      Patient was previously employed by the [employer]. Her [place] [employer] [place] medical insurance plan termed earlier this month, however patient reports that she called her former employer and was told she still has coverage to  or , and that the hospital/clinic staff can call [phone] for verification/authorization. Patient's significant other is helping her with business matters and she will have a new insurance starting in March, but she doesn't know the details or have the new card yet. Advised for her/her significant other to provide new insurance information to staff as soon as possible.  Called [employer] and spoke with Yoshi who stated that patient's policy was effective since 12/13/15, it termed on 17 but patient still has coverage through the awa period which ends on 3/7/17 (reference number for this call is [id number]).                 Other identified concerns/needs: Patient stated no needs or  concerns.    Plan: Patient to return home with her significant other and son via family car at d/c from the hospital.    Interventions/Referrals: None yet.    Patient/caregiver engaged in treatment planning process.    Oncology Social Worker providing psychosocial and supportive counseling, resources, education, assistance and discharge planning as appropriate.  Patient/caregiver state understanding of  available resources,  following, remains available.    Provided patient with business card containing contact numbers for this covering Oncology Social Worker, and for her primary Oncology Social Worker Perla Meek LCSW. Encouraged for patient/significant other to make contact as needed.    Will continue to follow.

## 2017-02-27 NOTE — PROGRESS NOTES
"Progress Note  Hematology/Oncology    Patient Name: Camilo Johnson  YOB: 1965    Admit Date: 2/24/2017                     LOS: 3    SUBJECTIVE:     Reason for Admission:  Uncontrolled pain    Interval history: Overnight, pain was better controlled. She said that she slept well last night 2-7am (which is longer than she has slept in "weeks").  Patient remained afebrile but tachycardic (108-125).     Current pain regiment:    -morphine 12-hr 180mg PO TID   -hydromorphone 8mg PO q4hr PRN for breakthrough      -hydromorphone 3 mg IV q2hr PRN (changed from 2mg to 3mg IV q2hr PRN overnight).    Patient required:    -hydromorphone 8mg PO q4hr PRN for breakthrough x3 during the day   -hydromorphone 2mg IV q2hr 5x during the day    - hydromorphone 3mg  IV q2hr x3 overnight    Review of Systems   Constitutional: Positive for appetite change and fatigue. Negative for chills and fever.   HENT: Negative for sore throat and trouble swallowing.   Eyes: Negative for pain and visual disturbance.   Respiratory: Negative for cough and shortness of breath.   Cardiovascular: Negative for chest pain and palpitations.   Gastrointestinal: Positive for abdominal pain and nausea but has significantly improved on new regiment. Negative for constipation and vomiting.   Genitourinary: Negative for difficulty urinating and dysuria.   Musculoskeletal: Negative for arthralgias and myalgias.   Skin: Negative for rash and wound.   Neurological: Negative for weakness, numbness and headaches.   OBJECTIVE:     Vital Signs Range (Last 24H):  Temp:  [97.6 °F (36.4 °C)-98.3 °F (36.8 °C)]   Pulse:  [108-125]   Resp:  [17-20]   BP: (132-144)/(81-96)   SpO2:  [94 %-100 %] Body mass index is 24.9 kg/(m^2).    I & O (Last 24H):    Intake/Output Summary (Last 24 hours) at 02/27/17 0650  Last data filed at 02/26/17 1800   Gross per 24 hour   Intake              960 ml   Output              525 ml   Net              435 ml     Physical " Exam   Constitutional: She is oriented to person, place, and time. She appears well-developed and well-nourished. No distress.   HENT:   Head: Normocephalic and atraumatic.   Mouth/Throat: Oropharynx is clear and moist.   Eyes: Conjunctivae are normal. Pupils are equal, round, and reactive to light.   Cardiovascular: Normal rate, regular rhythm, normal heart sounds and intact distal pulses.   Pulmonary/Chest: Effort normal and breath sounds normal.   Abdominal: Soft. Bowel sounds are normal. She exhibits distension (mildly distended). There is tenderness.   Musculoskeletal: She exhibits edema (trace BLE).   Neurological: She is alert and oriented to person, place, and time.   Skin: Skin is warm and dry. No rash noted.   Vitals reviewed.    Medications:  Scheduled:   amlodipine  2.5 mg Oral Daily    enoxaparin  40 mg Subcutaneous Daily    folic acid  1 mg Oral Daily    gabapentin  300 mg Oral TID    morphine  180 mg Oral TID    polyethylene glycol  17 g Oral BID    vitamin D  5,000 Units Oral Daily       Infusions:       PRN:  HYDROmorphone, HYDROmorphone, ondansetron, ondansetron, promethazine, ramelteon, senna-docusate 8.6-50 mg    Diagnostic Results:  Lab Results   Component Value Date    WBC 6.07 02/24/2017    HGB 11.7 (L) 02/24/2017    HCT 34.6 (L) 02/24/2017    MCV 81 (L) 02/24/2017     02/24/2017     No results for input(s): GLU, NA, K, CL, CO2, BUN, CREATININE, CALCIUM, MG in the last 24 hours.  Lab Results   Component Value Date    INR 1.1 01/26/2017    INR 1.0 05/27/2016    INR 1.0 04/09/2016     Lab Results   Component Value Date    HGBA1C 5.8 03/18/2016     No results for input(s): POCTGLUCOSE in the last 72 hours.      ASSESSMENT/PLAN:     Active Hospital Problems    Diagnosis  POA    *Uncontrolled pain [R52]  Yes    Cancer associated pain [G89.3]  Yes     Chronic    Sickle cell trait [D57.3]  Yes     Chronic    Malignant neoplasm of tail of pancreas [C25.2]  Yes    Essential  hypertension [I10]  Yes     Chronic      Resolved Hospital Problems    Diagnosis Date Resolved POA   No resolved problems to display.     * Uncontrolled pain  - Patient with uncontrolled pain on regimen of morphine 12-hr 160mg PO q8hr, hydromorphone 4mg PO q4hr PRN (taking 6 times daily) at home.  - OME equivalent of 576mg in 24hr period as per home regimen. Required additional hydromorphone 2mg IV x 4 while in ED.   - With additional ED doses and dose adjustment, 24 hour oral morphine equivalent ~600. Will divide this as 2/3 long-acting and 1/3 short-acting.  - in last 24 hours, patient required 3 doses of hydromorphone 2mg IV q2hr and hydromorphone 6mg PO q4hr PRN   -based on her new increase of pain requirement last 24 hours, her new pain regiment will be morphine 12-hr 240mg PO TID, hydromorphone 8mg PO q4hr PRN for breakthrough with hydromorphone 3mg IV q2hr PRN   - will reassess this afternoon and see how she is doing with her hydromorphone 8mg PO q4 and adjust as necessary      Essential hypertension  - Continuing amlodipine 2.5mg PO daily.     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 Zaman in clinic.  - Suspect current pain secondary to malignancy.  - Active chemotherapy with nabpaclitaxel and gemcitabine (s/p day 2 cycle 8 on 02/17/17; was due for day 3 02/24/17).   - Will need to reschedule next cycle after pain better controlled.     Sickle cell trait  - No acute issues at this time. Lower suspicion for sickle cell trait as primary cause of worsening pain.      Cancer associated pain  - As under uncontrolled pain.  - Continuing gabapentin 300mg PO TID as adjunct pain control.     Dispo: pending clinical improvement.    DVT: Enoxaparin 40mg  Diet: Adult Regular    Staff attestation to follow.    Val Ochoa  Internal Medicine, PGY1

## 2017-02-28 PROCEDURE — 25000003 PHARM REV CODE 250: Performed by: STUDENT IN AN ORGANIZED HEALTH CARE EDUCATION/TRAINING PROGRAM

## 2017-02-28 PROCEDURE — 63600175 PHARM REV CODE 636 W HCPCS: Performed by: INTERNAL MEDICINE

## 2017-02-28 PROCEDURE — 25000003 PHARM REV CODE 250: Performed by: INTERNAL MEDICINE

## 2017-02-28 PROCEDURE — 99232 SBSQ HOSP IP/OBS MODERATE 35: CPT | Mod: ,,, | Performed by: INTERNAL MEDICINE

## 2017-02-28 PROCEDURE — 20600001 HC STEP DOWN PRIVATE ROOM

## 2017-02-28 RX ORDER — HYDROMORPHONE HYDROCHLORIDE 2 MG/1
10 TABLET ORAL
Status: DISCONTINUED | OUTPATIENT
Start: 2017-02-28 | End: 2017-03-09 | Stop reason: HOSPADM

## 2017-02-28 RX ADMIN — MORPHINE SULFATE 180 MG: 60 TABLET, EXTENDED RELEASE ORAL at 02:02

## 2017-02-28 RX ADMIN — HYDROMORPHONE HYDROCHLORIDE 3 MG: 1 INJECTION, SOLUTION INTRAMUSCULAR; INTRAVENOUS; SUBCUTANEOUS at 04:02

## 2017-02-28 RX ADMIN — HYDROMORPHONE HYDROCHLORIDE 6 MG: 2 TABLET ORAL at 02:02

## 2017-02-28 RX ADMIN — POLYETHYLENE GLYCOL 3350 17 G: 17 POWDER, FOR SOLUTION ORAL at 10:02

## 2017-02-28 RX ADMIN — GABAPENTIN 300 MG: 300 CAPSULE ORAL at 02:02

## 2017-02-28 RX ADMIN — AMLODIPINE BESYLATE 2.5 MG: 2.5 TABLET ORAL at 09:02

## 2017-02-28 RX ADMIN — POLYETHYLENE GLYCOL 3350 17 G: 17 POWDER, FOR SOLUTION ORAL at 09:02

## 2017-02-28 RX ADMIN — HYDROMORPHONE HYDROCHLORIDE 3 MG: 1 INJECTION, SOLUTION INTRAMUSCULAR; INTRAVENOUS; SUBCUTANEOUS at 03:02

## 2017-02-28 RX ADMIN — HYDROMORPHONE HYDROCHLORIDE 3 MG: 1 INJECTION, SOLUTION INTRAMUSCULAR; INTRAVENOUS; SUBCUTANEOUS at 06:02

## 2017-02-28 RX ADMIN — GABAPENTIN 300 MG: 300 CAPSULE ORAL at 10:02

## 2017-02-28 RX ADMIN — MORPHINE SULFATE 240 MG: 60 TABLET, EXTENDED RELEASE ORAL at 10:02

## 2017-02-28 RX ADMIN — GABAPENTIN 300 MG: 300 CAPSULE ORAL at 06:02

## 2017-02-28 RX ADMIN — MORPHINE SULFATE 60 MG: 60 TABLET, EXTENDED RELEASE ORAL at 02:02

## 2017-02-28 RX ADMIN — HYDROMORPHONE HYDROCHLORIDE 12 MG: 2 TABLET ORAL at 09:02

## 2017-02-28 RX ADMIN — HYDROMORPHONE HYDROCHLORIDE 3 MG: 1 INJECTION, SOLUTION INTRAMUSCULAR; INTRAVENOUS; SUBCUTANEOUS at 01:02

## 2017-02-28 RX ADMIN — HYDROMORPHONE HYDROCHLORIDE 12 MG: 2 TABLET ORAL at 02:02

## 2017-02-28 RX ADMIN — MORPHINE SULFATE 240 MG: 60 TABLET, EXTENDED RELEASE ORAL at 06:02

## 2017-02-28 RX ADMIN — HYDROMORPHONE HYDROCHLORIDE 10 MG: 2 TABLET ORAL at 06:02

## 2017-02-28 RX ADMIN — VITAMIN D, TAB 1000IU (100/BT) 5000 UNITS: 25 TAB at 09:02

## 2017-02-28 RX ADMIN — FOLIC ACID 1 MG: 1 TABLET ORAL at 09:02

## 2017-02-28 RX ADMIN — SIMETHICONE CHEW TAB 80 MG 80 MG: 80 TABLET ORAL at 11:02

## 2017-02-28 RX ADMIN — ENOXAPARIN SODIUM 40 MG: 100 INJECTION SUBCUTANEOUS at 11:02

## 2017-02-28 NOTE — PLAN OF CARE
Problem: Patient Care Overview  Goal: Plan of Care Review  Outcome: Ongoing (interventions implemented as appropriate)  Afebrile. Free from falls or injury. Prn Dilaudid PO and IV given for pain. Scheduled MS contin given. Bed locked in lowest position, non skid socks on, call light within reach. Pt instructed to call if any assistance is needed. Vitals stable.  Will cont to sadi pt.

## 2017-02-28 NOTE — PLAN OF CARE
Problem: Patient Care Overview  Goal: Plan of Care Review  Outcome: Ongoing (interventions implemented as appropriate)  POC reviewed with pt.  No falls or trauma this shift. Pt c/o continuous pain. Prn dilaudid PO and IV given, mild to moderate relief obtained.  Continues on 240 mg MSContin TID. Pt states she did have 1 BM last night.  No emesis this shift.  Afebrile.  VSS.  Low bed, call light within reach, will continue to monitor.

## 2017-02-28 NOTE — PROGRESS NOTES
Progress Note  Hematology/Oncology    Patient Name: Camilo Johnson  YOB: 1965    Admit Date: 2/24/2017                     LOS: 4    SUBJECTIVE:     Reason for Admission:  Uncontrolled pain    Interval history: Overnight, pain was not well controlled.  Patient kept records of pain scale every hour until she fell asleep (consistently 8-10/10 even after BM at 10pm).  Patient remained afebrile but tachycardic (108-118).       Current pain regiment:    -morphine 12-hr 240mg PO TID   -hydromorphone 12mg PO q4hr PRN for breakthrough (changed from 8mg to 12mg IV q4hr PRN yesterday afternoon).     -hydromorphone 3 mg IV q2hr PRN     Patient required:    -hydromorphone 8 mg PO q4hr PRN for breakthrough x1 during the day   -hydromorphone 12 mg PO q4hr PRN for breakthrough x3 during the day   - hydromorphone 3mg  IV q2hr x7 in last 24 hours    Review of Systems   Constitutional: Positive for appetite change and fatigue. Negative for chills and fever.   HENT: Negative for sore throat and trouble swallowing.   Eyes: Negative for pain and visual disturbance.   Respiratory: Negative for cough and shortness of breath.   Cardiovascular: Negative for chest pain and palpitations.   Gastrointestinal: Positive for abdominal pain and nausea but has significantly improved on new regiment. Negative for constipation and vomiting.   Genitourinary: Negative for difficulty urinating and dysuria.   Musculoskeletal: Negative for arthralgias and myalgias.   Skin: Negative for rash and wound.   Neurological: Negative for weakness, numbness and headaches.   OBJECTIVE:     Vital Signs Range (Last 24H):  Temp:  [98 °F (36.7 °C)-98.8 °F (37.1 °C)]   Pulse:  [108-118]   Resp:  [18]   BP: (120-144)/(74-92)   SpO2:  [96 %-98 %] Body mass index is 24.9 kg/(m^2).    I & O (Last 24H):    Intake/Output Summary (Last 24 hours) at 02/28/17 0730  Last data filed at 02/27/17 1000   Gross per 24 hour   Intake                0 ml   Output               325 ml   Net             -325 ml     Physical Exam   Constitutional: She is oriented to person, place, and time. She appears well-developed and well-nourished. No distress.   HENT:   Head: Normocephalic and atraumatic.   Mouth/Throat: Oropharynx is clear and moist.   Eyes: Conjunctivae are normal. Pupils are equal, round, and reactive to light.   Cardiovascular: Normal rate, regular rhythm, normal heart sounds and intact distal pulses.   Pulmonary/Chest: Effort normal and breath sounds normal.   Abdominal: Soft. Bowel sounds are normal. She exhibits distension (mildly distended). There is tenderness.   Musculoskeletal: She exhibits edema (trace BLE).   Neurological: She is alert and oriented to person, place, and time.   Skin: Skin is warm and dry. No rash noted.   Vitals reviewed.    Medications:  Scheduled:   amlodipine  2.5 mg Oral Daily    enoxaparin  40 mg Subcutaneous Daily    folic acid  1 mg Oral Daily    gabapentin  300 mg Oral TID    morphine  240 mg Oral TID    polyethylene glycol  17 g Oral BID    vitamin D  5,000 Units Oral Daily       Infusions:       PRN:  HYDROmorphone, HYDROmorphone, lactulose, ondansetron, ondansetron, promethazine, ramelteon, senna-docusate 8.6-50 mg, simethicone    Diagnostic Results:  Lab Results   Component Value Date    WBC 6.07 02/24/2017    HGB 11.7 (L) 02/24/2017    HCT 34.6 (L) 02/24/2017    MCV 81 (L) 02/24/2017     02/24/2017     No results for input(s): GLU, NA, K, CL, CO2, BUN, CREATININE, CALCIUM, MG in the last 24 hours.  Lab Results   Component Value Date    INR 1.1 01/26/2017    INR 1.0 05/27/2016    INR 1.0 04/09/2016     Lab Results   Component Value Date    HGBA1C 5.8 03/18/2016     No results for input(s): POCTGLUCOSE in the last 72 hours.      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     * Uncontrolled pain  - Patient with uncontrolled pain on regimen of morphine 12-hr 160mg PO q8hr, hydromorphone 4mg PO q4hr PRN (taking 6 times daily) at home.  - OME equivalent of 576mg in 24hr period as per home regimen. Required additional hydromorphone 2mg IV x 4 while in ED.   - With additional ED doses and dose adjustment, 24 hour oral morphine equivalent ~600. Will divide this as 2/3 long-acting and 1/3 short-acting.  - in last 24 hours, patient required 3 doses of hydromorphone 2mg IV q2hr and hydromorphone 6mg PO q4hr PRN   -will remain at her current pain regiment: morphine 12-hr 240mg PO TID, hydromorphone 12mg PO q4hr PRN for breakthrough with hydromorphone 3mg IV q2hr PRN   -We will see how the 3mg of dilaudid is working for her today and adjust based on this.     Essential hypertension  - Continuing amlodipine 2.5mg PO daily.     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 Zaman in clinic.  - Suspect current pain secondary to malignancy.  - Active chemotherapy with nabpaclitaxel and gemcitabine (s/p day 2 cycle 8 on 02/17/17; was due for day 3 02/24/17).   - Will need to reschedule next cycle after pain better controlled.     Sickle cell trait  - No acute issues at this time. Lower suspicion for sickle cell trait as primary cause of worsening pain.      Cancer associated pain  - As under uncontrolled pain.  - Continuing gabapentin 300mg PO TID as adjunct pain control.     Dispo: pending clinical improvement.    DVT: Enoxaparin 40mg  Diet: Adult Regular    Staff attestation to follow.    Val Ochoa  Internal Medicine, PGY1

## 2017-03-01 LAB
ALBUMIN SERPL BCP-MCNC: 3 G/DL
ALP SERPL-CCNC: 96 U/L
ALT SERPL W/O P-5'-P-CCNC: 14 U/L
ANION GAP SERPL CALC-SCNC: 8 MMOL/L
AST SERPL-CCNC: 20 U/L
BILIRUB SERPL-MCNC: 1.2 MG/DL
BILIRUB UR QL STRIP: NEGATIVE
BUN SERPL-MCNC: 7 MG/DL
CALCIUM SERPL-MCNC: 9.5 MG/DL
CHLORIDE SERPL-SCNC: 94 MMOL/L
CLARITY UR REFRACT.AUTO: CLEAR
CO2 SERPL-SCNC: 29 MMOL/L
COLOR UR AUTO: YELLOW
CREAT SERPL-MCNC: 0.7 MG/DL
EST. GFR  (AFRICAN AMERICAN): >60 ML/MIN/1.73 M^2
EST. GFR  (NON AFRICAN AMERICAN): >60 ML/MIN/1.73 M^2
GLUCOSE SERPL-MCNC: 114 MG/DL
GLUCOSE UR QL STRIP: NEGATIVE
HGB UR QL STRIP: NEGATIVE
KETONES UR QL STRIP: NEGATIVE
LEUKOCYTE ESTERASE UR QL STRIP: NEGATIVE
NITRITE UR QL STRIP: NEGATIVE
PH UR STRIP: 6 [PH] (ref 5–8)
POTASSIUM SERPL-SCNC: 4.3 MMOL/L
PROT SERPL-MCNC: 7.2 G/DL
PROT UR QL STRIP: ABNORMAL
SODIUM SERPL-SCNC: 131 MMOL/L
SP GR UR STRIP: 1.01 (ref 1–1.03)
URN SPEC COLLECT METH UR: ABNORMAL
UROBILINOGEN UR STRIP-ACNC: NEGATIVE EU/DL

## 2017-03-01 PROCEDURE — 81003 URINALYSIS AUTO W/O SCOPE: CPT

## 2017-03-01 PROCEDURE — 20600001 HC STEP DOWN PRIVATE ROOM

## 2017-03-01 PROCEDURE — 25000003 PHARM REV CODE 250: Performed by: STUDENT IN AN ORGANIZED HEALTH CARE EDUCATION/TRAINING PROGRAM

## 2017-03-01 PROCEDURE — 80053 COMPREHEN METABOLIC PANEL: CPT

## 2017-03-01 PROCEDURE — 36415 COLL VENOUS BLD VENIPUNCTURE: CPT

## 2017-03-01 PROCEDURE — 99232 SBSQ HOSP IP/OBS MODERATE 35: CPT | Mod: ,,, | Performed by: INTERNAL MEDICINE

## 2017-03-01 PROCEDURE — 93010 ELECTROCARDIOGRAM REPORT: CPT | Mod: ,,, | Performed by: INTERNAL MEDICINE

## 2017-03-01 PROCEDURE — 63600175 PHARM REV CODE 636 W HCPCS: Performed by: INTERNAL MEDICINE

## 2017-03-01 PROCEDURE — 25000003 PHARM REV CODE 250: Performed by: INTERNAL MEDICINE

## 2017-03-01 PROCEDURE — 93005 ELECTROCARDIOGRAM TRACING: CPT

## 2017-03-01 RX ORDER — METHADONE HYDROCHLORIDE 10 MG/1
10 TABLET ORAL 3 TIMES DAILY
Status: DISCONTINUED | OUTPATIENT
Start: 2017-03-01 | End: 2017-03-07

## 2017-03-01 RX ORDER — BISACODYL 10 MG
10 SUPPOSITORY, RECTAL RECTAL ONCE
Status: DISCONTINUED | OUTPATIENT
Start: 2017-03-01 | End: 2017-03-09 | Stop reason: HOSPADM

## 2017-03-01 RX ORDER — LACTULOSE 10 G/15ML
20 SOLUTION ORAL 3 TIMES DAILY
Status: DISCONTINUED | OUTPATIENT
Start: 2017-03-01 | End: 2017-03-08

## 2017-03-01 RX ADMIN — POLYETHYLENE GLYCOL 3350 17 G: 17 POWDER, FOR SOLUTION ORAL at 08:03

## 2017-03-01 RX ADMIN — LACTULOSE 20 G: 10 SOLUTION ORAL at 12:03

## 2017-03-01 RX ADMIN — HYDROMORPHONE HYDROCHLORIDE 10 MG: 2 TABLET ORAL at 12:03

## 2017-03-01 RX ADMIN — SIMETHICONE CHEW TAB 80 MG 80 MG: 80 TABLET ORAL at 12:03

## 2017-03-01 RX ADMIN — HYDROMORPHONE HYDROCHLORIDE 3 MG: 1 INJECTION, SOLUTION INTRAMUSCULAR; INTRAVENOUS; SUBCUTANEOUS at 07:03

## 2017-03-01 RX ADMIN — HYDROMORPHONE HYDROCHLORIDE 10 MG: 2 TABLET ORAL at 05:03

## 2017-03-01 RX ADMIN — HYDROMORPHONE HYDROCHLORIDE 3 MG: 1 INJECTION, SOLUTION INTRAMUSCULAR; INTRAVENOUS; SUBCUTANEOUS at 02:03

## 2017-03-01 RX ADMIN — VITAMIN D, TAB 1000IU (100/BT) 5000 UNITS: 25 TAB at 10:03

## 2017-03-01 RX ADMIN — SIMETHICONE CHEW TAB 80 MG 80 MG: 80 TABLET ORAL at 09:03

## 2017-03-01 RX ADMIN — MORPHINE SULFATE 240 MG: 60 TABLET, EXTENDED RELEASE ORAL at 01:03

## 2017-03-01 RX ADMIN — LACTULOSE 20 G: 10 SOLUTION ORAL at 09:03

## 2017-03-01 RX ADMIN — POLYETHYLENE GLYCOL 3350 17 G: 17 POWDER, FOR SOLUTION ORAL at 09:03

## 2017-03-01 RX ADMIN — HYDROMORPHONE HYDROCHLORIDE 10 MG: 2 TABLET ORAL at 08:03

## 2017-03-01 RX ADMIN — ENOXAPARIN SODIUM 40 MG: 100 INJECTION SUBCUTANEOUS at 12:03

## 2017-03-01 RX ADMIN — GABAPENTIN 300 MG: 300 CAPSULE ORAL at 05:03

## 2017-03-01 RX ADMIN — GABAPENTIN 300 MG: 300 CAPSULE ORAL at 09:03

## 2017-03-01 RX ADMIN — MORPHINE SULFATE 240 MG: 60 TABLET, EXTENDED RELEASE ORAL at 05:03

## 2017-03-01 RX ADMIN — SIMETHICONE CHEW TAB 80 MG 80 MG: 80 TABLET ORAL at 10:03

## 2017-03-01 RX ADMIN — METHADONE HYDROCHLORIDE 10 MG: 10 TABLET ORAL at 09:03

## 2017-03-01 RX ADMIN — FOLIC ACID 1 MG: 1 TABLET ORAL at 08:03

## 2017-03-01 RX ADMIN — AMLODIPINE BESYLATE 2.5 MG: 2.5 TABLET ORAL at 09:03

## 2017-03-01 RX ADMIN — GABAPENTIN 300 MG: 300 CAPSULE ORAL at 01:03

## 2017-03-01 NOTE — PROGRESS NOTES
[facility]  Adult Nutrition  Consult Note    SUMMARY     Recommendations    Recommendation/Intervention:   1. Encourage pt to consume >75% meals.   2. Order Boost Plus OS TID for added calories and protein. Encourage small bites of bland foods.    Goals: Patient to consume > 75% of meals     Nutrition Goal Status: progressing towards goal  Communication of RD Recs: reviewed with RN    Continuum of Care Plan    Referral to Outpatient Services: other (see comments) (Nutrition D/C Planning: PO intake >75% + ONS)    Reason for Assessment    Reason for Assessment: RD follow-up  Diagnosis: cancer diagnosis/related complications, other (see comments) (metastatic pancreatic cancer)  Relevent Medical History: HTN, sickle cell trait, metastatic pancreatic tail cancer to lung and liver   Interdisciplinary Rounds: attended     General Information Comments: Pt with  at bedside. RD provided nutrition guidance and encouraging low-fiber foods to decreased gas production as pt continues to c/o abd pain, per MD note, abd distended and hypoactive bowel sounds.     Nutrition Prescription Ordered    Current Diet Order: Regular      Oral Nutrition Supplement: Boost Plus     Evaluation of Received Nutrients/Fluid Intake     Oral Fluid (mL): 1200      IV Fluid (mL): 33       Comments: LBM 2/27        Nutrition Risk Screen     Nutrition Risk Screen: reduced oral intake over the last month    Nutrition/Diet History    Patient Reported Diet/Restrictions/Preferences: general  Typical Food/Fluid Intake: Patient reports fair appetite and PO intake PTA.  Food Preferences: No cultural or Sabianism needs identified at this time.        Factors Affecting Nutritional Intake: decreased appetite, pain     Labs/Tests/Procedures/Meds     Pertinent Labs Reviewed: reviewed  Pertinent Labs Comments: Na 135, K 4.1, CO2 30, Glu 102  Pertinent Medications Reviewed: reviewed  Pertinent Medications Comments: folic acid, vitamin  D    Physical Findings    Overall Physical Appearance:  (nourished)  Tubes:  (none)  Oral/Mouth Cavity: WDL  Skin: intact    Anthropometrics     Height (inches): 64.02 in  Weight Method: Bed Scale  Weight (kg): 65.8 kg     Ideal Body Weight (IBW), Female: 120.1 lb     % Ideal Body Weight, Female (lb): 120.78 lb  BMI (kg/m2): 24.89  BMI Grade: 18.5-24.9 - normal  Usual Body Weight (UBW), k.1 kg  % Usual Body Weight: 88.8     Weight Loss: unintentional     Estimated/Assessed Needs    Weight Used For Calorie Calculations: 65.8 kg (145 lb 1 oz)   Height (cm): 162.6 cm     Energy Need Method: Kcal/kg (8724-1231 kcal/day (30-35 kcal/kg))     RMR (Powell-St. Jeor Equation): 1266.59        Weight Used For Protein Calculations: 65.8 kg (145 lb 1 oz)  Protein Requirements: 79-92 g/day (1.2-1.4 g/kg)    Fluid Need Method: RDA Method (1 mL/kcal or per MD)     Monitor and Evaluation    Food and Nutrient Intake: energy intake, food and beverage intake  Food and Nutrient Adminstration: diet order     Physical Activity and Function: nutrition-related ADLs and IADLs  Anthropometric Measurements: weight change  Biochemical Data, Medical Tests and Procedures: electrolyte and renal panel, gastrointestinal profile  Nutrition-Focused Physical Findings: overall appearance    Nutrition Risk    Level of Risk: moderate    Nutrition Follow-Up    RD Follow-up?: Yes    Nutrition Diagnosis       Nutrition Diagnosis  Problem: Increased nutrient needs  Etiology: increased demand for calories and protein  Signs/Symptoms: diagnosis of metastatic cancer  Nutrition Diagnosis Status: continues

## 2017-03-01 NOTE — PLAN OF CARE
Problem: Patient Care Overview  Goal: Plan of Care Review  Outcome: Ongoing (interventions implemented as appropriate)  Patient is AAOx4. Pt is calm and cooperative. Pt is highly particular about BMs as pt had a hx of a SBO.  Teaching and education performed. Patient remains free from falls and injury this shift. Bed in low, locked position, environment is free of clutter, with call bell in reach. Patient encouraged to call for assistance. Patient verbalized understanding. All belongings within reach. No N/V throughout shift. Afebrile. C/O pain to abdomen and back - administered dilaudid PO x2, simethicone once, and scheduled MS Contin. Pt still has complains of 6/10 pain. Will continue to monitor.

## 2017-03-01 NOTE — PROGRESS NOTES
[facility]  Adult Nutrition  Consult Note    SUMMARY     Recommendations    Recommendation/Intervention:   1. Encourage pt to consume >75% meals.   2. Order Boost Plus OS TID for added calories and protein. Encourage small bites of bland foods.  RD to monitor    Goals: Patient to consume > 75% of meals  Nutrition Goal Status: progressing towards goal  Communication of RD Recs: reviewed with RN    Continuum of Care Plan    Referral to Outpatient Services: other (see comments) (Nutrition D/C Planning: PO intake >75% + ONS)    Reason for Assessment    Reason for Assessment: RD follow-up  Diagnosis: cancer diagnosis/related complications, other (see comments) (metastatic pancreatic cancer)  Relevent Medical History: HTN, sickle cell trait, metastatic pancreatic tail cancer to lung and liver   Interdisciplinary Rounds: attended     General Information Comments: Pt oriented; c/o abd pain, per MD note, abd distended and hypoactive bowel sounds.     Nutrition Prescription Ordered    Current Diet Order: Regular      Oral Nutrition Supplement: Boost Plus     Evaluation of Received Nutrients/Fluid Intake     Oral Fluid (mL): 1200      IV Fluid (mL): 33    Comments: LBM 2/27        Nutrition Risk Screen     Nutrition Risk Screen: reduced oral intake over the last month    Nutrition/Diet History    Patient Reported Diet/Restrictions/Preferences: general  Typical Food/Fluid Intake: Patient reports fair appetite and PO intake PTA.  Food Preferences: No cultural or Amish needs identified at this time.        Factors Affecting Nutritional Intake: decreased appetite, pain      Labs/Tests/Procedures/Meds       Pertinent Labs Reviewed: reviewed  Pertinent Labs Comments: Na 135, K 4.1, CO2 30, Glu 102  Pertinent Medications Reviewed: reviewed  Pertinent Medications Comments: folic acid, vitamin D    Physical Findings    Overall Physical Appearance:  (nourished)  Tubes:  (none)  Oral/Mouth Cavity: WDL  Skin:  intact    Anthropometrics       Height (inches): 64.02 in  Weight Method: Bed Scale  Weight (kg): 65.8 kg     Ideal Body Weight (IBW), Female: 120.1 lb     % Ideal Body Weight, Female (lb): 120.78 lb  BMI (kg/m2): 24.89  BMI Grade: 18.5-24.9 - normal  Usual Body Weight (UBW), k.1 kg  % Usual Body Weight: 88.8     Weight Loss: unintentional     Estimated/Assessed Needs    Weight Used For Calorie Calculations: 65.8 kg (145 lb 1 oz)   Height (cm): 162.6 cm     Energy Need Method: Kcal/kg (3854-7518 kcal/day (30-35 kcal/kg))     RMR ([place]-St. Jeor Equation): 1266.59        Weight Used For Protein Calculations: 65.8 kg (145 lb 1 oz)  Protein Requirements: 79-92 g/day (1.2-1.4 g/kg)    Fluid Need Method: RDA Method (1 mL/kcal or per MD)       Monitor and Evaluation    Food and Nutrient Intake: energy intake, food and beverage intake  Food and Nutrient Adminstration: diet order     Physical Activity and Function: nutrition-related ADLs and IADLs  Anthropometric Measurements: weight change  Biochemical Data, Medical Tests and Procedures: electrolyte and renal panel, gastrointestinal profile  Nutrition-Focused Physical Findings: overall appearance    Nutrition Risk    Level of Risk: moderate    Nutrition Follow-Up    RD Follow-up?: Yes    Nutrition Diagnosis    Nutrition Diagnosis  Problem: Increased nutrient needs  Etiology: increased demand for calories and protein  Signs/Symptoms: diagnosis of metastatic cancer  Nutrition Diagnosis Status: continues    Problem: Inadequate energy intake  Etiology: related to decreased appetite and pain  S&S: AEB pt consuming <25% meals   Status: New

## 2017-03-01 NOTE — PROGRESS NOTES
Progress Note  Hematology/Oncology    Patient Name: Camilo Johnson  YOB: 1965    Admit Date: 2/24/2017                     LOS: 5    SUBJECTIVE:     Reason for Admission:  Uncontrolled pain    Interval history: Yesterday patient and I had a long discussion about concerns regarding function on her high level of morphine.  She voiced concerns that she was very drowsy and not with it on this new pain regiment.  At the time of the discussion her pain was 6/10.  We facetimed with her  (her mother was also in the room) to discuss an appropriate balance between functionality and pain control.  I had suggested that we try reducing the 12mg hydromorphone for breath through pain to 10 mg q3h, as patient was more alert when she was receiving 12 mg q4hr.      Overnight, patient said that her pain remained at 6/10.  She was able to sleep off an on for roughly 9 hours.  She said that she is ok with this level of drowsiness but needs more for pain control.     Current pain regiment:    -morphine 12-hr 240mg PO TID   -hydromorphone 12mg PO q3hr PRN for breakthrough (changed from 8mg to 12mg IV q4hr PRN yesterday afternoon).     -hydromorphone 3 mg IV q2hr PRN     Patient required: (720 maintenance and 244 mg for breakthrough)     -hydromorphone 12 mg PO q3hr PRN for breakthrough x3 during the day   -hydromorphone 10mg PO q4hr PRN for breakthrough x1 during the day   - hydromorphone 3mg  IV q2hr x1 in last 24 hours     Also of note, patient reports that she has some bambi colored urine x3 days.  Patient denies dysuria, frequency or urgency. Explained to patient that may be 2/2 to morphine use but that I will order a UA.     Review of Systems   Constitutional: Positive for appetite change and fatigue. Negative for chills and fever.   HENT: Negative for sore throat and trouble swallowing.   Eyes: Negative for pain and visual disturbance.   Respiratory: Negative for cough and shortness of breath.    Cardiovascular: Negative for chest pain and palpitations.   Gastrointestinal: Positive for abdominal pain and nausea but has significantly improved on new regiment. Negative for constipation and vomiting.   Genitourinary: Negative for difficulty urinating and dysuria.   Musculoskeletal: Negative for arthralgias and myalgias.   Skin: Negative for rash and wound.   Neurological: Negative for weakness, numbness and headaches.   OBJECTIVE:     Vital Signs Range (Last 24H):  Temp:  [98.3 °F (36.8 °C)-98.9 °F (37.2 °C)]   Pulse:  [120-140]   Resp:  [18]   BP: (123-145)/(79-90)   SpO2:  [94 %-97 %] Body mass index is 24.9 kg/(m^2).    I & O (Last 24H):    Intake/Output Summary (Last 24 hours) at 03/01/17 0702  Last data filed at 03/01/17 0412   Gross per 24 hour   Intake             1233 ml   Output              625 ml   Net              608 ml     Physical Exam   Constitutional: She is oriented to person, place, and time. She appears well-developed and well-nourished. No distress.   HENT:   Head: Normocephalic and atraumatic.   Mouth/Throat: Oropharynx is clear and moist.   Eyes: Conjunctivae are normal. Pupils are equal, round, and reactive to light.   Cardiovascular: Normal rate, regular rhythm, normal heart sounds and intact distal pulses.   Pulmonary/Chest: Effort normal and breath sounds normal.   Abdominal: Soft. Bowel sounds are normal. She exhibits distension (mildly distended). There is tenderness.   Musculoskeletal: She exhibits edema (trace BLE).   Neurological: She is alert and oriented to person, place, and time.   Skin: Skin is warm and dry. No rash noted.   Vitals reviewed.    Medications:  Scheduled:   amlodipine  2.5 mg Oral Daily    enoxaparin  40 mg Subcutaneous Daily    folic acid  1 mg Oral Daily    gabapentin  300 mg Oral TID    morphine  240 mg Oral TID    polyethylene glycol  17 g Oral BID    vitamin D  5,000 Units Oral Daily       Infusions:       PRN:  HYDROmorphone, HYDROmorphone,  lactulose, ondansetron, ondansetron, promethazine, ramelteon, senna-docusate 8.6-50 mg, simethicone    Diagnostic Results:  Lab Results   Component Value Date    WBC 6.07 02/24/2017    HGB 11.7 (L) 02/24/2017    HCT 34.6 (L) 02/24/2017    MCV 81 (L) 02/24/2017     02/24/2017     No results for input(s): GLU, NA, K, CL, CO2, BUN, CREATININE, CALCIUM, MG in the last 24 hours.  Lab Results   Component Value Date    INR 1.1 01/26/2017    INR 1.0 05/27/2016    INR 1.0 04/09/2016     Lab Results   Component Value Date    HGBA1C 5.8 03/18/2016     No results for input(s): POCTGLUCOSE in the last 72 hours.      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     * Uncontrolled pain  - Patient with uncontrolled pain on regimen of morphine 12-hr 160mg PO q8hr, hydromorphone 4mg PO q4hr PRN (taking 6 times daily) at home.  - OME equivalent of 576mg in 24hr period as per home regimen. Required additional hydromorphone 2mg IV x 4 while in ED.   - With additional ED doses and dose adjustment, 24 hour oral morphine equivalent ~600. Will divide this as 2/3 long-acting and 1/3 short-acting.  - in last 24 hours, patient required 3 doses of hydromorphone 2mg IV q2hr and hydromorphone 6mg PO q4hr PRN   -will remain at her current pain regiment: morphine 12-hr 240mg PO TID, hydromorphone 10mg PO q3hr PRN for breakthrough with hydromorphone 3mg IV q2hr PRN   -given that her pain has not improved we will consult anesthesia for some advice on pain control.       Essential hypertension  - Continuing amlodipine 2.5mg PO daily.     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 Zaman in clinic.  - Suspect current pain secondary to malignancy.  - Active chemotherapy with nabpaclitaxel and gemcitabine (s/p day 2 cycle 8 on 02/17/17; was due for day 3 02/24/17).   - Will need to reschedule next cycle after pain better controlled.     Sickle cell trait  - No acute issues at this time. Lower suspicion for sickle cell trait as primary cause of worsening pain.      Cancer associated pain  - As under uncontrolled pain.  - Continuing gabapentin 300mg PO TID as adjunct pain control.    Urinary Changes  -urine is dark, likely 2/2 to morphine use  -patient denies any pain, frequency or urgency  -will get UA to further assess    Dispo: pending clinical improvement and pain control  DVT: Enoxaparin 40mg  Diet: Adult Regular    Staff attestation to follow.    Val Ochoa  Internal Medicine, PGY1

## 2017-03-01 NOTE — CONSULTS
Pt seen at bedside.  Discussed chronic abdominal pain.  Patient with high opioid need secondary to chronic pain.  Although the etiology of the patient's pain is likely multifactorial, involving the pancreatic tail malignancy as well as delayed bowel transit, patient received no relief from celiac plexus block at OSH last year.  In review of patient's imaging, there isn't a good window to perform the celiac plexus block.  Therefore, we would recommend conservative measures at this time with attempts to improve bowel motility and constipation.    Tj Caceres MD  PGY-V  Interventional Radiology

## 2017-03-01 NOTE — PROGRESS NOTES
Dr Tyler notified pt with severe abdominal pain even after dilaudid iv given. Pt with distended firm abdomen and severe abdominal pain  started after lactulose given, Md  made aware. Don Rivas and Jayson in room.

## 2017-03-02 PROCEDURE — 25000003 PHARM REV CODE 250: Performed by: INTERNAL MEDICINE

## 2017-03-02 PROCEDURE — 99232 SBSQ HOSP IP/OBS MODERATE 35: CPT | Mod: ,,, | Performed by: INTERNAL MEDICINE

## 2017-03-02 PROCEDURE — 25000003 PHARM REV CODE 250: Performed by: STUDENT IN AN ORGANIZED HEALTH CARE EDUCATION/TRAINING PROGRAM

## 2017-03-02 PROCEDURE — 63600175 PHARM REV CODE 636 W HCPCS: Performed by: INTERNAL MEDICINE

## 2017-03-02 PROCEDURE — 20600001 HC STEP DOWN PRIVATE ROOM

## 2017-03-02 RX ORDER — AMOXICILLIN 250 MG
1 CAPSULE ORAL 2 TIMES DAILY
Status: DISCONTINUED | OUTPATIENT
Start: 2017-03-02 | End: 2017-03-09 | Stop reason: HOSPADM

## 2017-03-02 RX ORDER — NITROGLYCERIN 0.4 MG/1
0.4 TABLET SUBLINGUAL ONCE
Status: DISCONTINUED | OUTPATIENT
Start: 2017-03-02 | End: 2017-03-02

## 2017-03-02 RX ADMIN — AMLODIPINE BESYLATE 2.5 MG: 2.5 TABLET ORAL at 08:03

## 2017-03-02 RX ADMIN — ENOXAPARIN SODIUM 40 MG: 100 INJECTION SUBCUTANEOUS at 02:03

## 2017-03-02 RX ADMIN — HYDROMORPHONE HYDROCHLORIDE 3 MG: 1 INJECTION, SOLUTION INTRAMUSCULAR; INTRAVENOUS; SUBCUTANEOUS at 11:03

## 2017-03-02 RX ADMIN — SIMETHICONE CHEW TAB 80 MG 80 MG: 80 TABLET ORAL at 02:03

## 2017-03-02 RX ADMIN — METHADONE HYDROCHLORIDE 10 MG: 10 TABLET ORAL at 02:03

## 2017-03-02 RX ADMIN — GABAPENTIN 300 MG: 300 CAPSULE ORAL at 02:03

## 2017-03-02 RX ADMIN — GABAPENTIN 300 MG: 300 CAPSULE ORAL at 05:03

## 2017-03-02 RX ADMIN — HYDROMORPHONE HYDROCHLORIDE 3 MG: 1 INJECTION, SOLUTION INTRAMUSCULAR; INTRAVENOUS; SUBCUTANEOUS at 08:03

## 2017-03-02 RX ADMIN — VITAMIN D, TAB 1000IU (100/BT) 5000 UNITS: 25 TAB at 08:03

## 2017-03-02 RX ADMIN — FOLIC ACID 1 MG: 1 TABLET ORAL at 08:03

## 2017-03-02 RX ADMIN — METHADONE HYDROCHLORIDE 10 MG: 10 TABLET ORAL at 09:03

## 2017-03-02 RX ADMIN — METHADONE HYDROCHLORIDE 10 MG: 10 TABLET ORAL at 05:03

## 2017-03-02 RX ADMIN — HYDROMORPHONE HYDROCHLORIDE 10 MG: 2 TABLET ORAL at 02:03

## 2017-03-02 RX ADMIN — SIMETHICONE CHEW TAB 80 MG 80 MG: 80 TABLET ORAL at 09:03

## 2017-03-02 RX ADMIN — HYDROMORPHONE HYDROCHLORIDE 10 MG: 2 TABLET ORAL at 12:03

## 2017-03-02 RX ADMIN — HYDROMORPHONE HYDROCHLORIDE 3 MG: 1 INJECTION, SOLUTION INTRAMUSCULAR; INTRAVENOUS; SUBCUTANEOUS at 05:03

## 2017-03-02 RX ADMIN — GABAPENTIN 300 MG: 300 CAPSULE ORAL at 09:03

## 2017-03-02 RX ADMIN — HYDROMORPHONE HYDROCHLORIDE 10 MG: 2 TABLET ORAL at 10:03

## 2017-03-02 RX ADMIN — POLYETHYLENE GLYCOL 3350 17 G: 17 POWDER, FOR SOLUTION ORAL at 08:03

## 2017-03-02 RX ADMIN — HYDROMORPHONE HYDROCHLORIDE 10 MG: 2 TABLET ORAL at 08:03

## 2017-03-02 NOTE — PROGRESS NOTES
Progress Note  Hematology/Oncology    Patient Name: Camilo Johnson  YOB: 1965    Admit Date: 2/24/2017                     LOS: 6    SUBJECTIVE:     Reason for Admission:  Uncontrolled pain    Interval history: Overnight, patient said that her pain remained at 6/10 last night.  Patient states that we are heading in the right direction with pain control and she is pleased that she is not as drowsy as previously.  She noticed that when she took her dilaudid IV for break through pain she had 20 seconds of SOB.  We also consulted IR to kelvin doing a celiac nerve block for her.  IR saw patient yesterday and stated that there isn't a good window to perform the celiac plexus block and instead recommended conservative measures (with attempts to improve bowel motility and constipation).    Current pain regiment:    -methadone 10mg PO TID   -hydromorphone 10mg PO q3hr PRN for breakthrough    -hydromorphone 3 mg IV q2hr PRN     Patient required (in last 24 hours)   -hydromorphone 10mg PO q4hr PRN for breakthrough x3 during the day   - hydromorphone 3mg  IV q2hr in last 24 hours x3     Also, patient had 5 BM yesterday and feels that her stomach is less distended and tender.     Review of Systems   Constitutional: Positive for appetite change and fatigue. Negative for chills and fever.   HENT: Negative for sore throat and trouble swallowing.   Eyes: Negative for pain and visual disturbance.   Respiratory: Negative for cough and shortness of breath.   Cardiovascular: Negative for chest pain and palpitations.   Gastrointestinal: Positive for abdominal pain and nausea but has significantly improved on new regiment. Negative for constipation and vomiting.   Genitourinary: Negative for difficulty urinating and dysuria.   Musculoskeletal: Negative for arthralgias and myalgias.   Skin: Negative for rash and wound.   Neurological: Negative for weakness, numbness and headaches.   OBJECTIVE:     Vital Signs Range  (Last 24H):  Temp:  [98.2 °F (36.8 °C)-99.2 °F (37.3 °C)]   Pulse:  [120-139]   Resp:  [18-22]   BP: (114-159)/()   SpO2:  [94 %-96 %] Body mass index is 24.9 kg/(m^2).    I & O (Last 24H):    Intake/Output Summary (Last 24 hours) at 03/02/17 0652  Last data filed at 03/02/17 0600   Gross per 24 hour   Intake              780 ml   Output              550 ml   Net              230 ml     Physical Exam   Constitutional: She is oriented to person, place, and time. She appears well-developed and well-nourished. No distress.   HENT:   Head: Normocephalic and atraumatic.   Mouth/Throat: Oropharynx is clear and moist.   Eyes: Conjunctivae are normal. Pupils are equal, round, and reactive to light.   Cardiovascular: Normal rate, regular rhythm, normal heart sounds and intact distal pulses.   Pulmonary/Chest: Effort normal and breath sounds normal.   Abdominal: Soft. Bowel sounds are normal. She exhibits distension (mildly distended). There is tenderness.   Musculoskeletal: She exhibits edema (trace BLE).   Neurological: She is alert and oriented to person, place, and time.   Skin: Skin is warm and dry. No rash noted.   Vitals reviewed.    Medications:  Scheduled:   amlodipine  2.5 mg Oral Daily    bisacodyl  10 mg Rectal Once    enoxaparin  40 mg Subcutaneous Daily    folic acid  1 mg Oral Daily    gabapentin  300 mg Oral TID    lactulose  20 g Oral TID    methadone  10 mg Oral TID    polyethylene glycol  17 g Oral BID    vitamin D  5,000 Units Oral Daily       Infusions:       PRN:  HYDROmorphone, HYDROmorphone, lactulose, ondansetron, ondansetron, promethazine, ramelteon, senna-docusate 8.6-50 mg, simethicone    Diagnostic Results:  Lab Results   Component Value Date    WBC 6.07 02/24/2017    HGB 11.7 (L) 02/24/2017    HCT 34.6 (L) 02/24/2017    MCV 81 (L) 02/24/2017     02/24/2017       Recent Labs  Lab 03/01/17  0905   *   *   K 4.3   CL 94*   CO2 29   BUN 7   CREATININE 0.7   CALCIUM  9.5     Lab Results   Component Value Date    INR 1.1 01/26/2017    INR 1.0 05/27/2016    INR 1.0 04/09/2016     Lab Results   Component Value Date    HGBA1C 5.8 03/18/2016     No results for input(s): POCTGLUCOSE in the last 72 hours.      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     * Uncontrolled pain  - Patient with uncontrolled pain on regimen of morphine 12-hr 160mg PO q8hr, hydromorphone 4mg PO q4hr PRN (taking 6 times daily) at home.  - OME equivalent of 576mg in 24hr period as per home regimen. Required additional hydromorphone 2mg IV x 4 while in ED.   - With additional ED doses and dose adjustment, 24 hour oral morphine equivalent ~600. We divided this as 2/3 long-acting and 1/3 short-acting.   -will remain at her current pain regiment: methadone 10mg TID, hydromorphone 10mg PO q3hr PRN for breakthrough with hydromorphone 3mg IV q2hr PRN     Constipation  -patient given lactulose yesterday in attempts to improve bowel motility and constipation  -patient's had extreme cramping and later refused the lactulose enema  -yesterday had 5 documented BM.  -feels as though stomach is less distended and less sore    Essential hypertension  - Continuing amlodipine 2.5mg PO daily.     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 Zaman in clinic.  - Suspect current pain secondary to malignancy.  - Active chemotherapy with nabpaclitaxel and gemcitabine (s/p day 2 cycle 8 on 02/17/17; was due for day 3 02/24/17).   - Will need to reschedule next cycle after pain  better controlled.     Sickle cell trait  - No acute issues at this time. Lower suspicion for sickle cell trait as primary cause of worsening pain.      Cancer associated pain  - As under uncontrolled pain.  - Continuing gabapentin 300mg PO TID as adjunct pain control.    Urinary Changes  -urine is dark, likely 2/2 to morphine use  -patient denies any pain, frequency or urgency  -UA is wnl, CMP indicates normal kidney function  -will continue to monitor    Dispo: pending clinical improvement and pain control  DVT: Enoxaparin 40mg  Diet: Adult Regular    Staff attestation to follow.    Val Ochoa  Internal Medicine, PGY1

## 2017-03-02 NOTE — PLAN OF CARE
Problem: Patient Care Overview  Goal: Plan of Care Review  Outcome: Ongoing (interventions implemented as appropriate)  Side rails up x2; call bell in place; bed in lowest, locked position; skid proof socks on; no evidence of skin breakdown; care plan explained to patient; pt remains free of injury. Pt tolerated a small amount of po, voids, BM x 2, pt with abdominal cramping and pain. Dilaudid 10 mg po given x 2, and iv dilaudid 3 mg given for breakthrough, MS contin po also given. Pt with increased tenderness and tightness to abdomen Dr Aaron, Dr Ochoa and team aware of pts increases pain following lactulose dose. Pt scheduled to receive methadone tonight.

## 2017-03-02 NOTE — PLAN OF CARE
Problem: Patient Care Overview  Goal: Plan of Care Review  Outcome: Ongoing (interventions implemented as appropriate)  Pt remained afebrile during night. Pt did complain of pain to abdomen and back, which was relieved with dilaudid 10 mg po. AAXO4.  Safety precautions maintained. Pt remained free from injury during night. Bed is in low and locked position with side rails up x 2. Call bell is within reach of pt.

## 2017-03-03 PROCEDURE — 25000003 PHARM REV CODE 250: Performed by: STUDENT IN AN ORGANIZED HEALTH CARE EDUCATION/TRAINING PROGRAM

## 2017-03-03 PROCEDURE — 25000003 PHARM REV CODE 250: Performed by: INTERNAL MEDICINE

## 2017-03-03 PROCEDURE — 20600001 HC STEP DOWN PRIVATE ROOM

## 2017-03-03 PROCEDURE — 99233 SBSQ HOSP IP/OBS HIGH 50: CPT | Mod: ,,, | Performed by: INTERNAL MEDICINE

## 2017-03-03 PROCEDURE — 63600175 PHARM REV CODE 636 W HCPCS: Performed by: INTERNAL MEDICINE

## 2017-03-03 RX ADMIN — HYDROMORPHONE HYDROCHLORIDE 3 MG: 1 INJECTION, SOLUTION INTRAMUSCULAR; INTRAVENOUS; SUBCUTANEOUS at 09:03

## 2017-03-03 RX ADMIN — HYDROMORPHONE HYDROCHLORIDE 3 MG: 1 INJECTION, SOLUTION INTRAMUSCULAR; INTRAVENOUS; SUBCUTANEOUS at 01:03

## 2017-03-03 RX ADMIN — HYDROMORPHONE HYDROCHLORIDE 10 MG: 2 TABLET ORAL at 08:03

## 2017-03-03 RX ADMIN — GABAPENTIN 300 MG: 300 CAPSULE ORAL at 09:03

## 2017-03-03 RX ADMIN — HYDROMORPHONE HYDROCHLORIDE 10 MG: 2 TABLET ORAL at 02:03

## 2017-03-03 RX ADMIN — METHADONE HYDROCHLORIDE 10 MG: 10 TABLET ORAL at 06:03

## 2017-03-03 RX ADMIN — HYDROMORPHONE HYDROCHLORIDE 10 MG: 2 TABLET ORAL at 12:03

## 2017-03-03 RX ADMIN — GABAPENTIN 300 MG: 300 CAPSULE ORAL at 02:03

## 2017-03-03 RX ADMIN — HYDROMORPHONE HYDROCHLORIDE 3 MG: 1 INJECTION, SOLUTION INTRAMUSCULAR; INTRAVENOUS; SUBCUTANEOUS at 06:03

## 2017-03-03 RX ADMIN — HYDROMORPHONE HYDROCHLORIDE 3 MG: 1 INJECTION, SOLUTION INTRAMUSCULAR; INTRAVENOUS; SUBCUTANEOUS at 10:03

## 2017-03-03 RX ADMIN — HYDROMORPHONE HYDROCHLORIDE 10 MG: 2 TABLET ORAL at 03:03

## 2017-03-03 RX ADMIN — ENOXAPARIN SODIUM 40 MG: 100 INJECTION SUBCUTANEOUS at 12:03

## 2017-03-03 RX ADMIN — SIMETHICONE CHEW TAB 80 MG 80 MG: 80 TABLET ORAL at 09:03

## 2017-03-03 RX ADMIN — HYDROMORPHONE HYDROCHLORIDE 3 MG: 1 INJECTION, SOLUTION INTRAMUSCULAR; INTRAVENOUS; SUBCUTANEOUS at 12:03

## 2017-03-03 RX ADMIN — AMLODIPINE BESYLATE 2.5 MG: 2.5 TABLET ORAL at 08:03

## 2017-03-03 RX ADMIN — STANDARDIZED SENNA CONCENTRATE AND DOCUSATE SODIUM 1 TABLET: 8.6; 5 TABLET, FILM COATED ORAL at 08:03

## 2017-03-03 RX ADMIN — METHADONE HYDROCHLORIDE 10 MG: 10 TABLET ORAL at 02:03

## 2017-03-03 RX ADMIN — FOLIC ACID 1 MG: 1 TABLET ORAL at 08:03

## 2017-03-03 RX ADMIN — VITAMIN D, TAB 1000IU (100/BT) 5000 UNITS: 25 TAB at 08:03

## 2017-03-03 RX ADMIN — GABAPENTIN 300 MG: 300 CAPSULE ORAL at 06:03

## 2017-03-03 RX ADMIN — HYDROMORPHONE HYDROCHLORIDE 10 MG: 2 TABLET ORAL at 07:03

## 2017-03-03 RX ADMIN — HYDROMORPHONE HYDROCHLORIDE 3 MG: 1 INJECTION, SOLUTION INTRAMUSCULAR; INTRAVENOUS; SUBCUTANEOUS at 03:03

## 2017-03-03 RX ADMIN — SIMETHICONE CHEW TAB 80 MG 80 MG: 80 TABLET ORAL at 02:03

## 2017-03-03 RX ADMIN — HYDROMORPHONE HYDROCHLORIDE 3 MG: 1 INJECTION, SOLUTION INTRAMUSCULAR; INTRAVENOUS; SUBCUTANEOUS at 02:03

## 2017-03-03 RX ADMIN — METHADONE HYDROCHLORIDE 10 MG: 10 TABLET ORAL at 09:03

## 2017-03-03 NOTE — PLAN OF CARE
Problem: Patient Care Overview  Goal: Plan of Care Review  Outcome: Ongoing (interventions implemented as appropriate)  Pt involved in plan of care and communicating needs throughout shift.  Family at bedside for most of shift and involved in care.  Pt up in room independently and ambulates with steady gait. Pt c/o abdominal pain; scheduled methadone admin as ordered; prn PO and IV dilaudid alternated with some effect; pt continues to c/o uncontrolled pain.  Pt is tolerating diet, voiding without difficulty; has had 2 loose BMs today; refusing lactulose and miralax today; did take senna this am.  PRN simethicone admin with some effect for c/o gas.  All VSS; no acute events so far this shift.  Pt remaining free from falls or injury throughout shift; bed in lowest position; call light within reach.  Pt instructed to call for assistance as needed.  Q1H rounding done on pt.

## 2017-03-03 NOTE — PROGRESS NOTES
Progress Note  Hematology/Oncology    Patient Name: Camilo Johnson  YOB: 1965    Admit Date: 2/24/2017                     LOS: 7    SUBJECTIVE:     Reason for Admission:  Uncontrolled pain    Interval history: Overnight, patient said that her pain was poorly controlled.  She states she was an 8-10/10.  She continues to have BM (total of 5 in last 24 hours).  She and I talked about starting a fleet enema to continue to clear out her bowels, given the amount of stool she has in there.  Shell consider trying this in the afternoon but she is having normal BM on her own    Current pain regiment:    -methadone 10mg PO TID    -hydromorphone 10mg PO q3hr PRN for breakthrough    -hydromorphone 3 mg IV q2hr PRN     Patient required (in last 24 hours)   -hydromorphone 10mg PO q4hr PRN for breakthrough x4 during the day   - hydromorphone 3mg  IV q2hr in last 24 hours x4      Review of Systems   Constitutional: Positive for appetite change and fatigue. Negative for chills and fever.   HENT: Negative for sore throat and trouble swallowing.   Eyes: Negative for pain and visual disturbance.   Respiratory: Negative for cough and shortness of breath.   Cardiovascular: Negative for chest pain and palpitations.   Gastrointestinal: Positive for abdominal pain and nausea but has significantly improved on new regiment. Negative for constipation and vomiting.   Genitourinary: Negative for difficulty urinating and dysuria.   Musculoskeletal: Negative for arthralgias and myalgias.   Skin: Negative for rash and wound.   Neurological: Negative for weakness, numbness and headaches.   OBJECTIVE:     Vital Signs Range (Last 24H):  Temp:  [98 °F (36.7 °C)-99 °F (37.2 °C)]   Pulse:  [121-133]   Resp:  [15-20]   BP: (119-141)/(60-85)   SpO2:  [93 %-99 %] Body mass index is 24.9 kg/(m^2).    I & O (Last 24H):    Intake/Output Summary (Last 24 hours) at 03/03/17 0620  Last data filed at 03/03/17 0357   Gross per 24 hour    Intake             1330 ml   Output             1051 ml   Net              279 ml     Physical Exam   Constitutional: She is oriented to person, place, and time. She appears well-developed and well-nourished. No distress.   HENT:   Head: Normocephalic and atraumatic.   Mouth/Throat: Oropharynx is clear and moist.   Eyes: Conjunctivae are normal. Pupils are equal, round, and reactive to light.   Cardiovascular: Normal rate, regular rhythm, normal heart sounds and intact distal pulses.   Pulmonary/Chest: Effort normal and breath sounds normal.   Abdominal: Soft. Bowel sounds are normal. She exhibits distension There is tenderness.   Musculoskeletal: She exhibits edema (trace BLE).   Neurological: She is alert and oriented to person, place, and time.   Skin: Skin is warm and dry. No rash noted.   Vitals reviewed.    Medications:  Scheduled:   amlodipine  2.5 mg Oral Daily    bisacodyl  10 mg Rectal Once    enoxaparin  40 mg Subcutaneous Daily    folic acid  1 mg Oral Daily    gabapentin  300 mg Oral TID    lactulose  20 g Oral TID    methadone  10 mg Oral TID    polyethylene glycol  17 g Oral BID    senna-docusate 8.6-50 mg  1 tablet Oral BID    vitamin D  5,000 Units Oral Daily       Infusions:       PRN:  HYDROmorphone, HYDROmorphone, ondansetron, promethazine, ramelteon, simethicone    Diagnostic Results:  Lab Results   Component Value Date    WBC 6.07 02/24/2017    HGB 11.7 (L) 02/24/2017    HCT 34.6 (L) 02/24/2017    MCV 81 (L) 02/24/2017     02/24/2017     No results for input(s): GLU, NA, K, CL, CO2, BUN, CREATININE, CALCIUM, MG in the last 24 hours.  Lab Results   Component Value Date    INR 1.1 01/26/2017    INR 1.0 05/27/2016    INR 1.0 04/09/2016     Lab Results   Component Value Date    HGBA1C 5.8 03/18/2016     No results for input(s): POCTGLUCOSE in the last 72 hours.      ASSESSMENT/PLAN:     Active Hospital Problems    Diagnosis  POA    *Uncontrolled pain [R52]  Yes    Cancer  associated pain [G89.3]  Yes     Chronic    Sickle cell trait [D57.3]  Yes     Chronic    Malignant neoplasm of tail of pancreas [C25.2]  Yes    Essential hypertension [I10]  Yes     Chronic      Resolved Hospital Problems    Diagnosis Date Resolved POA   No resolved problems to display.     * Uncontrolled pain  - Patient with uncontrolled pain on regimen of morphine 12-hr 160mg PO q8hr, hydromorphone 4mg PO q4hr PRN (taking 6 times daily) at home.  - OME equivalent of 576mg in 24hr period as per home regimen. Required additional hydromorphone 2mg IV x 4 while in ED.   - With additional ED doses and dose adjustment, 24 hour oral morphine equivalent ~600. We divided this as 2/3 long-acting and 1/3 short-acting.   -will remain at her current pain regiment: methadone 10mg TID, hydromorphone 10mg PO q3hr PRN for breakthrough with hydromorphone 3mg IV q2hr PRN     Constipation  -patient given lactulose yesterday in attempts to improve bowel motility and constipation  -patient's had extreme cramping and later refused the lactulose enema  -yesterday had 5 documented BM.  -feels as though stomach is less distended and less sore    Essential hypertension  - Continuing amlodipine 2.5mg PO daily.     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 Zaman in clinic.  - Suspect current pain secondary to malignancy.  - Active chemotherapy with nabpaclitaxel and gemcitabine (s/p day 2 cycle 8 on 02/17/17; was due for day 3 02/24/17).   - Will need to reschedule next cycle after pain better controlled.     Sickle cell trait  - No acute issues at this time. Lower suspicion for sickle cell trait as primary cause of worsening pain.      Cancer associated pain  - As under uncontrolled pain.  - Continuing gabapentin 300mg PO TID as adjunct pain control.    Urinary Changes  -urine is dark, likely 2/2 to morphine use  -patient denies any  pain, frequency or urgency  -UA is wnl, CMP indicates normal kidney function  -will continue to monitor    Dispo: pending clinical improvement and pain control  DVT: Enoxaparin 40mg  Diet: Adult Regular    Staff attestation to follow.    Val Ochoa  Internal Medicine, PGY1      ATTENDING NOTE, ONCOLOGY INPATIENT TEAM    As above; events of last 24 hours noted.  Patient seen and examined, chart reviewed.  Appears uncomfortable, complaining of abdominal distention.  Has had several BMs since yesterday.  Lungs are clear to auscultation.  Abdomen is soft, distended, with mild tenderness.    PLAN  Continue methadone plus hydromorphone prn.  Continue laxatives.  Continue enoxaparin.  We will follow.      Shahid Spann MD

## 2017-03-03 NOTE — PLAN OF CARE
Problem: Patient Care Overview  Goal: Plan of Care Review  Outcome: Ongoing (interventions implemented as appropriate)  Pt remained afebrile during night. Pt did complain of pain to abdomen and back, which was relieved with dilaudid 10 mg po and dilaudid 3 mg IV. AAXO4. Safety precautions maintained. Pt remained free from injury during night. Bed is in low and locked position with side rails up x 2. Call bell is within reach of pt.

## 2017-03-04 PROCEDURE — 63600175 PHARM REV CODE 636 W HCPCS: Performed by: INTERNAL MEDICINE

## 2017-03-04 PROCEDURE — 25000003 PHARM REV CODE 250: Performed by: STUDENT IN AN ORGANIZED HEALTH CARE EDUCATION/TRAINING PROGRAM

## 2017-03-04 PROCEDURE — 25000003 PHARM REV CODE 250: Performed by: INTERNAL MEDICINE

## 2017-03-04 PROCEDURE — 20600001 HC STEP DOWN PRIVATE ROOM

## 2017-03-04 PROCEDURE — 99233 SBSQ HOSP IP/OBS HIGH 50: CPT | Mod: ,,, | Performed by: INTERNAL MEDICINE

## 2017-03-04 RX ADMIN — METHADONE HYDROCHLORIDE 10 MG: 10 TABLET ORAL at 09:03

## 2017-03-04 RX ADMIN — STANDARDIZED SENNA CONCENTRATE AND DOCUSATE SODIUM 1 TABLET: 8.6; 5 TABLET, FILM COATED ORAL at 09:03

## 2017-03-04 RX ADMIN — ENOXAPARIN SODIUM 40 MG: 100 INJECTION SUBCUTANEOUS at 11:03

## 2017-03-04 RX ADMIN — AMLODIPINE BESYLATE 2.5 MG: 2.5 TABLET ORAL at 09:03

## 2017-03-04 RX ADMIN — HYDROMORPHONE HYDROCHLORIDE 3 MG: 1 INJECTION, SOLUTION INTRAMUSCULAR; INTRAVENOUS; SUBCUTANEOUS at 12:03

## 2017-03-04 RX ADMIN — GABAPENTIN 300 MG: 300 CAPSULE ORAL at 06:03

## 2017-03-04 RX ADMIN — METHADONE HYDROCHLORIDE 10 MG: 10 TABLET ORAL at 06:03

## 2017-03-04 RX ADMIN — FOLIC ACID 1 MG: 1 TABLET ORAL at 09:03

## 2017-03-04 RX ADMIN — POLYETHYLENE GLYCOL 3350 17 G: 17 POWDER, FOR SOLUTION ORAL at 09:03

## 2017-03-04 RX ADMIN — VITAMIN D, TAB 1000IU (100/BT) 5000 UNITS: 25 TAB at 09:03

## 2017-03-04 RX ADMIN — LACTULOSE 20 G: 10 SOLUTION ORAL at 09:03

## 2017-03-04 RX ADMIN — GABAPENTIN 300 MG: 300 CAPSULE ORAL at 09:03

## 2017-03-04 RX ADMIN — SIMETHICONE CHEW TAB 80 MG 80 MG: 80 TABLET ORAL at 09:03

## 2017-03-04 RX ADMIN — HYDROMORPHONE HYDROCHLORIDE 3 MG: 1 INJECTION, SOLUTION INTRAMUSCULAR; INTRAVENOUS; SUBCUTANEOUS at 07:03

## 2017-03-04 RX ADMIN — GABAPENTIN 300 MG: 300 CAPSULE ORAL at 02:03

## 2017-03-04 RX ADMIN — SIMETHICONE CHEW TAB 80 MG 80 MG: 80 TABLET ORAL at 04:03

## 2017-03-04 RX ADMIN — METHADONE HYDROCHLORIDE 10 MG: 10 TABLET ORAL at 02:03

## 2017-03-04 RX ADMIN — HYDROMORPHONE HYDROCHLORIDE 3 MG: 1 INJECTION, SOLUTION INTRAMUSCULAR; INTRAVENOUS; SUBCUTANEOUS at 04:03

## 2017-03-04 RX ADMIN — HYDROMORPHONE HYDROCHLORIDE 10 MG: 2 TABLET ORAL at 09:03

## 2017-03-04 RX ADMIN — HYDROMORPHONE HYDROCHLORIDE 3 MG: 1 INJECTION, SOLUTION INTRAMUSCULAR; INTRAVENOUS; SUBCUTANEOUS at 11:03

## 2017-03-04 NOTE — PROGRESS NOTES
Progress Note  Hematology/Oncology    Patient Name: Camilo Johnson  YOB: 1965    Admit Date: 2/24/2017                     LOS: 8    SUBJECTIVE:     Reason for Admission:  Uncontrolled pain    Interval history:   Overnight, afebrile and Pt continues to have 7-8/ 10 abdominal pain that is mildly relieved with PRN meds. The pain is improved with BMs and passing gas. She continues to have BM (total of 6 in last 24 hours). She would like to take her suppository and fleets enema today.     Current pain regiment:    -methadone 10mg PO TID    -hydromorphone 10mg PO q3hr PRN for breakthrough    -hydromorphone 3 mg IV q2hr PRN     Patient required (in last 24 hours)   -hydromorphone 10mg PO q4hr PRN for breakthrough x4 during the day yesterday.    - hydromorphone 3mg  IV q2hr in last 24 hours x7      Review of Systems   Constitutional: Positive for appetite change and fatigue. Negative for chills and fever.   HENT: Negative for sore throat and trouble swallowing.   Eyes: Negative for pain and visual disturbance.   Respiratory: Negative for cough and shortness of breath.   Cardiovascular: Negative for chest pain and palpitations.   Gastrointestinal: Positive for abdominal pain and nausea but has significantly improved on new regiment. Negative for constipation and vomiting.   Genitourinary: Negative for difficulty urinating and dysuria.   Musculoskeletal: Negative for arthralgias and myalgias.   Skin: Negative for rash and wound.   Neurological: Negative for weakness, numbness and headaches.   OBJECTIVE:     Vital Signs Range (Last 24H):  Temp:  [97.5 °F (36.4 °C)-99.3 °F (37.4 °C)]   Pulse:  [115-125]   Resp:  [16-20]   BP: (119-140)/(75-91)   SpO2:  [90 %-99 %] Body mass index is 24.9 kg/(m^2).    I & O (Last 24H):    Intake/Output Summary (Last 24 hours) at 03/04/17 0684  Last data filed at 03/04/17 0434   Gross per 24 hour   Intake             1480 ml   Output             1000 ml   Net               480 ml     Physical Exam   Constitutional: She is oriented to person, place, and time. She appears well-developed and well-nourished. No distress.   HENT:   Head: Normocephalic and atraumatic.   Mouth/Throat: Oropharynx is clear and moist.   Eyes: Conjunctivae are normal. Pupils are equal, round, and reactive to light.   Cardiovascular: Normal rate, regular rhythm, normal heart sounds and intact distal pulses.   Pulmonary/Chest: Effort normal and breath sounds normal.   Abdominal: Soft. Bowel sounds are normal. She exhibits distension There is tenderness.   Musculoskeletal: She exhibits edema (trace BLE).   Neurological: She is alert and oriented to person, place, and time.   Skin: Skin is warm and dry. No rash noted.   Vitals reviewed.    Medications:  Scheduled:   amlodipine  2.5 mg Oral Daily    bisacodyl  10 mg Rectal Once    enoxaparin  40 mg Subcutaneous Daily    folic acid  1 mg Oral Daily    gabapentin  300 mg Oral TID    lactulose  20 g Oral TID    methadone  10 mg Oral TID    polyethylene glycol  17 g Oral BID    senna-docusate 8.6-50 mg  1 tablet Oral BID    vitamin D  5,000 Units Oral Daily       Infusions:       PRN:  HYDROmorphone, HYDROmorphone, ondansetron, promethazine, ramelteon, simethicone    Diagnostic Results:  Lab Results   Component Value Date    WBC 6.07 02/24/2017    HGB 11.7 (L) 02/24/2017    HCT 34.6 (L) 02/24/2017    MCV 81 (L) 02/24/2017     02/24/2017     No results for input(s): GLU, NA, K, CL, CO2, BUN, CREATININE, CALCIUM, MG in the last 24 hours.  Lab Results   Component Value Date    INR 1.1 01/26/2017    INR 1.0 05/27/2016    INR 1.0 04/09/2016     Lab Results   Component Value Date    HGBA1C 5.8 03/18/2016     No results for input(s): POCTGLUCOSE in the last 72 hours.      ASSESSMENT/PLAN:     Active Hospital Problems    Diagnosis  POA    *Uncontrolled pain [R52]  Yes    Cancer associated pain [G89.3]  Yes     Chronic    Sickle cell trait [D57.3]  Yes      Chronic    Malignant neoplasm of tail of pancreas [C25.2]  Yes    Essential hypertension [I10]  Yes     Chronic      Resolved Hospital Problems    Diagnosis Date Resolved POA   No resolved problems to display.     * Uncontrolled pain  - Patient with uncontrolled pain on regimen of morphine 12-hr 160mg PO q8hr, hydromorphone 4mg PO q4hr PRN (taking 6 times daily) at home.  - OME equivalent of 576mg in 24hr period as per home regimen. Required additional hydromorphone 2mg IV x 4 while in ED.   - With additional ED doses and dose adjustment, 24 hour oral morphine equivalent ~600. We divided this as 2/3 long-acting and 1/3 short-acting.   -will remain at her current pain regiment: methadone 10mg TID, hydromorphone 10mg PO q3hr PRN for breakthrough with hydromorphone 3mg IV q2hr PRN     Constipation  -patient given lactulose yesterday in attempts to improve bowel motility and constipation  -patient's had extreme cramping and later refused the lactulose enema  -yesterday had 6 documented BM.  -feels as though stomach is less distended and less sore    Essential hypertension  - Continuing amlodipine 2.5mg PO daily.     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 Zaman in clinic.  - Suspect current pain secondary to malignancy.  - Active chemotherapy with nabpaclitaxel and gemcitabine (s/p day 2 cycle 8 on 02/17/17; was due for day 3 02/24/17).   - Will need to reschedule next cycle after pain better controlled.     Sickle cell trait  - No acute issues at this time. Lower suspicion for sickle cell trait as primary cause of worsening pain.      Cancer associated pain  - As under uncontrolled pain.  - Continuing gabapentin 300mg PO TID as adjunct pain control.    Urinary Changes  -urine is dark, likely 2/2 to morphine use  -patient denies any pain, frequency or urgency  -UA is wnl, CMP indicates normal kidney  function  -will continue to monitor    Dispo: pending clinical improvement and pain control  DVT: Enoxaparin 40mg  Diet: Adult Regular    Staff attestation to follow.    Delvis Tyler MD PGY-1  Staff Attestation to follow         ATTENDING NOTE, ONCOLOGY INPATIENT TEAM    As above; events of last 24 hours noted.  Patient seen and examined, chart reviewed.  Appears uncomfortable, complaining of abdominal distention.  Lungs are clear to auscultation.  Abdomen is soft, distended, with mild diffuse tenderness..      PLAN  Repeat KUB today.  Continue same pain regimen until tomorrow.  Per patient's request we will administer one enema today.  \Continue oral laxatives.  We will follow.  Her questions were answered to her satisfaction.      Shahid Spann MD

## 2017-03-05 PROCEDURE — 25000003 PHARM REV CODE 250: Performed by: STUDENT IN AN ORGANIZED HEALTH CARE EDUCATION/TRAINING PROGRAM

## 2017-03-05 PROCEDURE — 99233 SBSQ HOSP IP/OBS HIGH 50: CPT | Mod: ,,, | Performed by: INTERNAL MEDICINE

## 2017-03-05 PROCEDURE — 20600001 HC STEP DOWN PRIVATE ROOM

## 2017-03-05 PROCEDURE — 25000003 PHARM REV CODE 250: Performed by: INTERNAL MEDICINE

## 2017-03-05 PROCEDURE — 63600175 PHARM REV CODE 636 W HCPCS: Performed by: INTERNAL MEDICINE

## 2017-03-05 PROCEDURE — 25500020 PHARM REV CODE 255: Performed by: STUDENT IN AN ORGANIZED HEALTH CARE EDUCATION/TRAINING PROGRAM

## 2017-03-05 PROCEDURE — 25500020 PHARM REV CODE 255: Performed by: INTERNAL MEDICINE

## 2017-03-05 RX ORDER — GABAPENTIN 300 MG/1
600 CAPSULE ORAL 3 TIMES DAILY
Status: DISCONTINUED | OUTPATIENT
Start: 2017-03-05 | End: 2017-03-09 | Stop reason: HOSPADM

## 2017-03-05 RX ORDER — METHADONE HYDROCHLORIDE 5 MG/1
5 TABLET ORAL 2 TIMES DAILY
Status: DISCONTINUED | OUTPATIENT
Start: 2017-03-05 | End: 2017-03-07

## 2017-03-05 RX ADMIN — GABAPENTIN 600 MG: 300 CAPSULE ORAL at 01:03

## 2017-03-05 RX ADMIN — HYDROMORPHONE HYDROCHLORIDE 10 MG: 2 TABLET ORAL at 05:03

## 2017-03-05 RX ADMIN — METHADONE HYDROCHLORIDE 10 MG: 10 TABLET ORAL at 01:03

## 2017-03-05 RX ADMIN — HYDROMORPHONE HYDROCHLORIDE 10 MG: 2 TABLET ORAL at 11:03

## 2017-03-05 RX ADMIN — AMLODIPINE BESYLATE 2.5 MG: 2.5 TABLET ORAL at 10:03

## 2017-03-05 RX ADMIN — HYDROMORPHONE HYDROCHLORIDE 3 MG: 1 INJECTION, SOLUTION INTRAMUSCULAR; INTRAVENOUS; SUBCUTANEOUS at 05:03

## 2017-03-05 RX ADMIN — HYDROMORPHONE HYDROCHLORIDE 3 MG: 1 INJECTION, SOLUTION INTRAMUSCULAR; INTRAVENOUS; SUBCUTANEOUS at 12:03

## 2017-03-05 RX ADMIN — FOLIC ACID 1 MG: 1 TABLET ORAL at 08:03

## 2017-03-05 RX ADMIN — RAMELTEON 8 MG: 8 TABLET, FILM COATED ORAL at 11:03

## 2017-03-05 RX ADMIN — HYDROMORPHONE HYDROCHLORIDE 10 MG: 2 TABLET ORAL at 01:03

## 2017-03-05 RX ADMIN — IOHEXOL 75 ML: 350 INJECTION, SOLUTION INTRAVENOUS at 07:03

## 2017-03-05 RX ADMIN — IOHEXOL 15 ML: 350 INJECTION, SOLUTION INTRAVENOUS at 04:03

## 2017-03-05 RX ADMIN — METHADONE HYDROCHLORIDE 10 MG: 10 TABLET ORAL at 09:03

## 2017-03-05 RX ADMIN — HYDROMORPHONE HYDROCHLORIDE 3 MG: 1 INJECTION, SOLUTION INTRAMUSCULAR; INTRAVENOUS; SUBCUTANEOUS at 08:03

## 2017-03-05 RX ADMIN — HYDROMORPHONE HYDROCHLORIDE 3 MG: 1 INJECTION, SOLUTION INTRAMUSCULAR; INTRAVENOUS; SUBCUTANEOUS at 10:03

## 2017-03-05 RX ADMIN — METHADONE HYDROCHLORIDE 10 MG: 10 TABLET ORAL at 05:03

## 2017-03-05 RX ADMIN — GABAPENTIN 300 MG: 300 CAPSULE ORAL at 05:03

## 2017-03-05 RX ADMIN — METHADONE HYDROCHLORIDE 5 MG: 10 TABLET ORAL at 01:03

## 2017-03-05 RX ADMIN — HYDROMORPHONE HYDROCHLORIDE 3 MG: 1 INJECTION, SOLUTION INTRAMUSCULAR; INTRAVENOUS; SUBCUTANEOUS at 04:03

## 2017-03-05 RX ADMIN — HYDROMORPHONE HYDROCHLORIDE 10 MG: 2 TABLET ORAL at 08:03

## 2017-03-05 RX ADMIN — ENOXAPARIN SODIUM 40 MG: 100 INJECTION SUBCUTANEOUS at 01:03

## 2017-03-05 RX ADMIN — SIMETHICONE CHEW TAB 80 MG 80 MG: 80 TABLET ORAL at 06:03

## 2017-03-05 RX ADMIN — VITAMIN D, TAB 1000IU (100/BT) 5000 UNITS: 25 TAB at 08:03

## 2017-03-05 RX ADMIN — GABAPENTIN 600 MG: 300 CAPSULE ORAL at 09:03

## 2017-03-05 NOTE — PROGRESS NOTES
Progress Note  Hematology/Oncology    Patient Name: Camilo Johnson  YOB: 1965    Admit Date: 2/24/2017                     LOS: 9    SUBJECTIVE:     Reason for Admission:  Uncontrolled pain    Interval history:   Overnight, afebrile and Pt continues to have 7-8/ 10 abdominal pain that is mildly relieved with PRN meds. She continues to have BM (total of 3 in last 24 hours).     Current pain regiment:    -methadone 10mg PO TID    -hydromorphone 10mg PO q3hr PRN for breakthrough    -hydromorphone 3 mg IV q2hr PRN     Patient required (in last 24 hours)   -hydromorphone 10mg PO q4hr PRN for breakthrough x3 during the day yesterday.    - hydromorphone 3mg  IV q2hr in last 24 hours x5      Review of Systems   Constitutional: Positive for appetite change and fatigue. Negative for chills and fever.   HENT: Negative for sore throat and trouble swallowing.   Eyes: Negative for pain and visual disturbance.   Respiratory: Negative for cough and shortness of breath.   Cardiovascular: Negative for chest pain and palpitations.   Gastrointestinal: Positive for abdominal pain and nausea but has significantly improved on new regiment. Negative for constipation and vomiting.   Genitourinary: Negative for difficulty urinating and dysuria.   Musculoskeletal: Negative for arthralgias and myalgias.   Skin: Negative for rash and wound.   Neurological: Negative for weakness, numbness and headaches.   OBJECTIVE:     Vital Signs Range (Last 24H):  Temp:  [98 °F (36.7 °C)-98.9 °F (37.2 °C)]   Pulse:  [114-123]   Resp:  [18-20]   BP: (113-142)/(71-89)   SpO2:  [93 %-99 %] Body mass index is 24.9 kg/(m^2).    I & O (Last 24H):    Intake/Output Summary (Last 24 hours) at 03/05/17 0634  Last data filed at 03/05/17 0403   Gross per 24 hour   Intake             1100 ml   Output              625 ml   Net              475 ml     Physical Exam   Constitutional: She is oriented to person, place, and time. She appears  well-developed and well-nourished. No distress.   HENT:   Head: Normocephalic and atraumatic.   Mouth/Throat: Oropharynx is clear and moist.   Eyes: Conjunctivae are normal. Pupils are equal, round, and reactive to light.   Cardiovascular: Normal rate, regular rhythm, normal heart sounds and intact distal pulses.   Pulmonary/Chest: Effort normal and breath sounds normal.   Abdominal: Soft. Bowel sounds are normal. She exhibits distension There is tenderness.   Musculoskeletal: She exhibits edema (trace BLE).   Neurological: She is alert and oriented to person, place, and time.   Skin: Skin is warm and dry. No rash noted.   Vitals reviewed.    Medications:  Scheduled:   amlodipine  2.5 mg Oral Daily    bisacodyl  10 mg Rectal Once    enoxaparin  40 mg Subcutaneous Daily    folic acid  1 mg Oral Daily    gabapentin  300 mg Oral TID    lactulose  20 g Oral TID    methadone  10 mg Oral TID    polyethylene glycol  17 g Oral BID    senna-docusate 8.6-50 mg  1 tablet Oral BID    vitamin D  5,000 Units Oral Daily       Infusions:       PRN:  HYDROmorphone, HYDROmorphone, ondansetron, promethazine, ramelteon, simethicone    Diagnostic Results:  Lab Results   Component Value Date    WBC 6.07 02/24/2017    HGB 11.7 (L) 02/24/2017    HCT 34.6 (L) 02/24/2017    MCV 81 (L) 02/24/2017     02/24/2017     No results for input(s): GLU, NA, K, CL, CO2, BUN, CREATININE, CALCIUM, MG in the last 24 hours.  Lab Results   Component Value Date    INR 1.1 01/26/2017    INR 1.0 05/27/2016    INR 1.0 04/09/2016     Lab Results   Component Value Date    HGBA1C 5.8 03/18/2016     No results for input(s): POCTGLUCOSE in the last 72 hours.      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     * Uncontrolled pain  - Patient with uncontrolled pain on regimen of morphine 12-hr 160mg PO q8hr, hydromorphone 4mg PO q4hr PRN (taking 6 times daily) at home.  - OME equivalent of 576mg in 24hr period as per home regimen. Required additional hydromorphone 2mg IV x 4 while in ED.   - With additional ED doses and dose adjustment, 24 hour oral morphine equivalent ~600. We divided this as 2/3 long-acting and 1/3 short-acting.   -will remain at her current pain regiment: methadone 10mg TID, hydromorphone 10mg PO q3hr PRN for breakthrough with hydromorphone 3mg IV q2hr PRN   -may consider increasing methadone dose today    Constipation  -yesterday had 3 documented BM.  -KUB completed yesterday    Essential hypertension  - Continuing amlodipine 2.5mg PO daily.     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 Zaman in clinic.  - Suspect current pain secondary to malignancy.  - Active chemotherapy with nabpaclitaxel and gemcitabine (s/p day 2 cycle 8 on 02/17/17; was due for day 3 02/24/17).   - Will need to reschedule next cycle after pain better controlled.     Sickle cell trait  - No acute issues at this time. Lower suspicion for sickle cell trait as primary cause of worsening pain.      Cancer associated pain  - As under uncontrolled pain.  - Continuing gabapentin 300mg PO TID as adjunct pain control.    Urinary Changes  -urine is dark, likely 2/2 to morphine use  -patient denies any pain, frequency or urgency  -UA is wnl, CMP indicates normal kidney function  -will continue to monitor    Dispo: pending clinical improvement and pain control  DVT: Enoxaparin 40mg  Diet: Adult Regular    Staff attestation to follow.    Val Ochoa MD  Internal Medicine, PGY1    ATTENDING NOTE, ONCOLOGY INPATIENT  TEAM    As above; events of last 24 hours noted.  Patient seen and examined, chart reviewed.  Appears uncomfortable, still complaining of abdominal pain and distention..  Lungs are clear to auscultation.  Abdomen is soft, distended. With active bowel sounds and mild diffuse tenderness..  Labs reviewed.    PLAN  Repeat abdominal film tonight.  Repeat labs in am.  Ask GI Service to evaluate re: possible stent placement.  Increase methadone to 15-10-15 mg.  Increase gabapentin to 600 mg tid.  Patient advised to remain ambulatory.  We will follow.        Shahid Spnan MD

## 2017-03-05 NOTE — PLAN OF CARE
Problem: Fall Risk (Adult)  Goal: Identify Related Risk Factors and Signs and Symptoms  Related risk factors and signs and symptoms are identified upon initiation of Human Response Clinical Practice Guideline (CPG)   Outcome: Ongoing (interventions implemented as appropriate)  No falls or trauma noted. Did place fall risk band on her due to use of multiple narcotics. And , instructed not to get up with out assistance. Belongings in reach. Bed low and call bell in reach. Family at bedside assist her with care.     Problem: Patient Care Overview  Goal: Plan of Care Review  Outcome: Ongoing (interventions implemented as appropriate)  Patient alert and makes needs known. Calls frequently for pain medication and requested oral medication for gas. Encouraged to ambulate and she stated she ambulated in [place] with her mother today and did not observe per this nurse. She stated she walked past the nursing station and back. Did receive and enema with good results . Noted order for abdominal xray today.  Ate some. Fluids tolerated well . Assessment on going.    Problem: Infection, Risk/Actual (Adult)  Goal: Identify Related Risk Factors and Signs and Symptoms  Related risk factors and signs and symptoms are identified upon initiation of Human Response Clinical Practice Guideline (CPG)   Outcome: Ongoing (interventions implemented as appropriate)  Vital signs stable. Afebrile .     Problem: Pain, Chronic (Adult)  Goal: Identify Related Risk Factors and Signs and Symptoms  Related risk factors and signs and symptoms are identified upon initiation of Human Response Clinical Practice Guideline (CPG)   Outcome: Ongoing (interventions implemented as appropriate)  Chronic pain. Mostly abdominal and she request medications frequently. Positions self for comfort.     Problem: Pressure Ulcer Risk (Iker Scale) (Adult,Obstetrics,Pediatric)  Goal: Identify Related Risk Factors and Signs and Symptoms  Related risk factors and signs and  symptoms are identified upon initiation of Human Response Clinical Practice Guideline (CPG)   Outcome: Ongoing (interventions implemented as appropriate)  Skin intact but very dry. Turns self and sat up in chair. Positions self with pillows.

## 2017-03-06 ENCOUNTER — TELEPHONE (OUTPATIENT)
Dept: NEUROLOGY | Facility: HOSPITAL | Age: 52
End: 2017-03-06

## 2017-03-06 ENCOUNTER — ANESTHESIA EVENT (OUTPATIENT)
Dept: ENDOSCOPY | Facility: HOSPITAL | Age: 52
DRG: 948 | End: 2017-03-06
Payer: COMMERCIAL

## 2017-03-06 LAB
ALBUMIN SERPL BCP-MCNC: 2.8 G/DL
ALP SERPL-CCNC: 124 U/L
ALT SERPL W/O P-5'-P-CCNC: 13 U/L
ANION GAP SERPL CALC-SCNC: 13 MMOL/L
ANISOCYTOSIS BLD QL SMEAR: SLIGHT
AST SERPL-CCNC: 20 U/L
BASOPHILS # BLD AUTO: 0.02 K/UL
BASOPHILS NFR BLD: 0.2 %
BILIRUB SERPL-MCNC: 1.2 MG/DL
BUN SERPL-MCNC: 11 MG/DL
BURR CELLS BLD QL SMEAR: ABNORMAL
CALCIUM SERPL-MCNC: 9.6 MG/DL
CHLORIDE SERPL-SCNC: 93 MMOL/L
CO2 SERPL-SCNC: 26 MMOL/L
CREAT SERPL-MCNC: 0.7 MG/DL
DIFFERENTIAL METHOD: ABNORMAL
EOSINOPHIL # BLD AUTO: 0 K/UL
EOSINOPHIL NFR BLD: 0.2 %
ERYTHROCYTE [DISTWIDTH] IN BLOOD BY AUTOMATED COUNT: 16.9 %
EST. GFR  (AFRICAN AMERICAN): >60 ML/MIN/1.73 M^2
EST. GFR  (NON AFRICAN AMERICAN): >60 ML/MIN/1.73 M^2
GLUCOSE SERPL-MCNC: 91 MG/DL
HCT VFR BLD AUTO: 28.8 %
HGB BLD-MCNC: 9.6 G/DL
HYPOCHROMIA BLD QL SMEAR: ABNORMAL
LYMPHOCYTES # BLD AUTO: 2.5 K/UL
LYMPHOCYTES NFR BLD: 18.7 %
MCH RBC QN AUTO: 26.7 PG
MCHC RBC AUTO-ENTMCNC: 33.3 %
MCV RBC AUTO: 80 FL
MONOCYTES # BLD AUTO: 1.3 K/UL
MONOCYTES NFR BLD: 10.1 %
NEUTROPHILS # BLD AUTO: 9.1 K/UL
NEUTROPHILS NFR BLD: 70.8 %
PLATELET # BLD AUTO: 168 K/UL
PLATELET BLD QL SMEAR: ABNORMAL
PMV BLD AUTO: 9.7 FL
POIKILOCYTOSIS BLD QL SMEAR: SLIGHT
POLYCHROMASIA BLD QL SMEAR: ABNORMAL
POTASSIUM SERPL-SCNC: 4.2 MMOL/L
PROT SERPL-MCNC: 7.3 G/DL
RBC # BLD AUTO: 3.6 M/UL
SODIUM SERPL-SCNC: 132 MMOL/L
WBC # BLD AUTO: 13.07 K/UL

## 2017-03-06 PROCEDURE — 63600175 PHARM REV CODE 636 W HCPCS: Performed by: INTERNAL MEDICINE

## 2017-03-06 PROCEDURE — 25000003 PHARM REV CODE 250: Performed by: STUDENT IN AN ORGANIZED HEALTH CARE EDUCATION/TRAINING PROGRAM

## 2017-03-06 PROCEDURE — 25000003 PHARM REV CODE 250: Performed by: INTERNAL MEDICINE

## 2017-03-06 PROCEDURE — 99233 SBSQ HOSP IP/OBS HIGH 50: CPT | Mod: ,,, | Performed by: INTERNAL MEDICINE

## 2017-03-06 PROCEDURE — 25500020 PHARM REV CODE 255: Performed by: INTERNAL MEDICINE

## 2017-03-06 PROCEDURE — 80053 COMPREHEN METABOLIC PANEL: CPT

## 2017-03-06 PROCEDURE — 99223 1ST HOSP IP/OBS HIGH 75: CPT | Mod: ,,, | Performed by: INTERNAL MEDICINE

## 2017-03-06 PROCEDURE — 36415 COLL VENOUS BLD VENIPUNCTURE: CPT

## 2017-03-06 PROCEDURE — 20600001 HC STEP DOWN PRIVATE ROOM

## 2017-03-06 PROCEDURE — 85025 COMPLETE CBC W/AUTO DIFF WBC: CPT

## 2017-03-06 RX ADMIN — HYDROMORPHONE HYDROCHLORIDE 10 MG: 2 TABLET ORAL at 02:03

## 2017-03-06 RX ADMIN — HYDROMORPHONE HYDROCHLORIDE 10 MG: 2 TABLET ORAL at 05:03

## 2017-03-06 RX ADMIN — HYDROMORPHONE HYDROCHLORIDE 3 MG: 1 INJECTION, SOLUTION INTRAMUSCULAR; INTRAVENOUS; SUBCUTANEOUS at 06:03

## 2017-03-06 RX ADMIN — METHADONE HYDROCHLORIDE 10 MG: 10 TABLET ORAL at 05:03

## 2017-03-06 RX ADMIN — METHADONE HYDROCHLORIDE 10 MG: 10 TABLET ORAL at 10:03

## 2017-03-06 RX ADMIN — HYDROMORPHONE HYDROCHLORIDE 10 MG: 2 TABLET ORAL at 09:03

## 2017-03-06 RX ADMIN — STANDARDIZED SENNA CONCENTRATE AND DOCUSATE SODIUM 1 TABLET: 8.6; 5 TABLET, FILM COATED ORAL at 09:03

## 2017-03-06 RX ADMIN — HYDROMORPHONE HYDROCHLORIDE 3 MG: 1 INJECTION, SOLUTION INTRAMUSCULAR; INTRAVENOUS; SUBCUTANEOUS at 12:03

## 2017-03-06 RX ADMIN — VITAMIN D, TAB 1000IU (100/BT) 5000 UNITS: 25 TAB at 09:03

## 2017-03-06 RX ADMIN — AMLODIPINE BESYLATE 2.5 MG: 2.5 TABLET ORAL at 09:03

## 2017-03-06 RX ADMIN — POLYETHYLENE GLYCOL 3350 17 G: 17 POWDER, FOR SOLUTION ORAL at 09:03

## 2017-03-06 RX ADMIN — FOLIC ACID 1 MG: 1 TABLET ORAL at 09:03

## 2017-03-06 RX ADMIN — SIMETHICONE CHEW TAB 80 MG 80 MG: 80 TABLET ORAL at 05:03

## 2017-03-06 RX ADMIN — METHADONE HYDROCHLORIDE 5 MG: 10 TABLET ORAL at 02:03

## 2017-03-06 RX ADMIN — DIATRIZOATE MEGLUMINE AND DIATRIZOATE SODIUM 120 ML: 600; 100 SOLUTION ORAL; RECTAL at 08:03

## 2017-03-06 RX ADMIN — SIMETHICONE CHEW TAB 80 MG 80 MG: 80 TABLET ORAL at 06:03

## 2017-03-06 RX ADMIN — GABAPENTIN 600 MG: 300 CAPSULE ORAL at 05:03

## 2017-03-06 RX ADMIN — GABAPENTIN 600 MG: 300 CAPSULE ORAL at 02:03

## 2017-03-06 RX ADMIN — ENOXAPARIN SODIUM 40 MG: 100 INJECTION SUBCUTANEOUS at 12:03

## 2017-03-06 RX ADMIN — METHADONE HYDROCHLORIDE 10 MG: 10 TABLET ORAL at 02:03

## 2017-03-06 RX ADMIN — METHADONE HYDROCHLORIDE 5 MG: 10 TABLET ORAL at 05:03

## 2017-03-06 RX ADMIN — GABAPENTIN 600 MG: 300 CAPSULE ORAL at 09:03

## 2017-03-06 NOTE — ANESTHESIA PREPROCEDURE EVALUATION
03/06/2017  Camilo Johnson is a 52 y.o., female with PMHx HTN, sickle cell trait, metastatic pancreatic tail cancer to lung and liver who presented to ED here 02/24 with worsening of her chronic abdominal pain, Found to have high grade colonic obstruction at the level of the splenic flexure, plan is for colonoscopy and palliative stent placement.     LDAs  Portacath L-subclavian.     Pre-operative evaluation for Procedure(s) (LRB):  COLONOSCOPY with STENT (N/A)    Camilo Johnson is a 52 y.o. female     Patient Active Problem List   Diagnosis    Essential hypertension    Pancreatic mass    Malignant neoplasm of tail of pancreas    Anxiety    Depression    Sickle cell trait    Intractable cyclical vomiting with nausea    Hemorrhoids, internal, with bleeding    Swelling of lower extremity    SOB (shortness of breath)    Paronychia    Edema    Pancreatic cancer metastasized to liver    Malnutrition compromising bodily function    Cancer associated pain    Uncontrolled pain       Review of patient's allergies indicates:  No Known Allergies    Current Facility-Administered Medications on File Prior to Encounter   Medication Dose Route Frequency Provider Last Rate Last Dose    heparin, porcine (PF) 100 unit/mL injection flush 500 Units  500 Units Intravenous PRN Sebas Zaman DO, ISAIAH        sodium chloride 0.9% flush 10 mL  10 mL Intravenous PRN Sebas Zaman DO, FACBETH         Current Outpatient Prescriptions on File Prior to Encounter   Medication Sig Dispense Refill    amlodipine (NORVASC) 2.5 MG tablet Take 1 tablet (2.5 mg total) by mouth once daily. 90 tablet 1    gabapentin (NEURONTIN) 300 MG capsule Take 1 capsule (300 mg total) by mouth 3 (three) times daily. 90 capsule 5    NNAMDI ROOT (NNAMDI EXTRACT ORAL) Take by mouth.      HYDROmorphone (DILAUDID) 4 MG  tablet Take 1 tablet (4 mg total) by mouth every 6 (six) hours as needed for Pain. 90 tablet 0    morphine (MS CONTIN) 100 MG 12 hr tablet Take 1 tablet (100 mg total) by mouth 2 (two) times daily. Combine with MS Contin 30 mg to take a total of 130mg three times daily (Patient taking differently: Take 100 mg by mouth 3 (three) times daily. Combine with MS Contin 30 mg to take a total of 130mg three times daily) 60 tablet 0    morphine (MS CONTIN) 30 MG 12 hr tablet Take 1 tablet (30 mg total) by mouth every 8 (eight) hours as needed for Pain. (Patient taking differently: Take 60 mg by mouth 3 (three) times daily. ) 90 tablet 0    multivitamin capsule Take 1 capsule by mouth once daily.      ondansetron (ZOFRAN) 8 MG tablet Take 1 tablet (8 mg total) by mouth every 8 (eight) hours as needed for Nausea. 60 tablet 2    polyethylene glycol (GLYCOLAX) 17 gram PwPk Take 17 g by mouth 2 (two) times daily. 100 each 3    promethazine (PHENERGAN) 25 MG tablet Take 1 tablet (25 mg total) by mouth every 6 (six) hours as needed for Nausea. 30 tablet 1    TURMERIC ROOT EXTRACT ORAL Take by mouth.      vitamin D3-folic acid 5,000-1 unit-mg Tab Take by mouth once daily.         Past Surgical History:   Procedure Laterality Date    BREAST SURGERY      breast reduction     SECTION      COLONOSCOPY N/A 2017    Procedure: COLONOSCOPY;  Surgeon: Elvis Stein MD;  Location[facility];  Service: Endoscopy;  Laterality: N/A;    TUBAL LIGATION         Social History     Social History    Marital status: Single     Spouse name: N/A    Number of children: N/A    Years of education: N/A     Occupational History    Admin [employer]     Social History Main Topics    Smoking status: Never Smoker    Smokeless tobacco: Never Used    Alcohol use Yes      Comment: 1 glass once or twice per week    Drug use: No    Sexual activity: Yes     Partners: Male     Other Topics Concern    Not on file      Social History Narrative         Vital Signs Range (Last 24H):  Temp:  [36.7 °C (98.1 °F)-37.2 °C (98.9 °F)]   Pulse:  [115-127]   Resp:  [17-20]   BP: (120-134)/(79-91)   SpO2:  [95 %-97 %]       CBC:   Recent Labs      03/06/17   0444   WBC  13.07*   RBC  3.60*   HGB  9.6*   HCT  28.8*   PLT  168   MCV  80*   MCH  26.7*   MCHC  33.3       CMP:   Recent Labs      03/06/17   0444   NA  132*   K  4.2   CL  93*   CO2  26   BUN  11   CREATININE  0.7   GLU  91   CALCIUM  9.6   ALBUMIN  2.8*   PROT  7.3   ALKPHOS  124   ALT  13   AST  20   BILITOT  1.2*       INR  No results for input(s): INR, PROTIME, APTT in the last 72 hours.    Invalid input(s): PT        Diagnostic Studies:      EKG:  Sinus tachycardia  Otherwise normal ECG  When compared with ECG of 30-JAN-2017 09:46,  No significant change was found  Confirmed by LASHON LAZAR MD ([id number]) on 3/2/2017 6:54:12 AM    2D Echo:  None in [facility] Anesthesia Evaluation    I have reviewed the Patient Summary Reports.    I have reviewed the Nursing Notes.   I have reviewed the Medications.     Review of Systems  Anesthesia Hx:  History of prior surgery of interest to airway management or planning: Previous anesthesia: MAC Denies Family Hx of Anesthesia complications.   Denies Personal Hx of Anesthesia complications.   Social:  Alcohol Use, Non-Smoker    Hematology/Oncology:  Hematology Normal      Hematology Comments: Sickle cell trait Current/Recent Cancer. chemotherapy  Oncology Comments: Pancreatic cancer, currently receiving chemotherapy      Cardiovascular:   Hypertension, well controlled ECG has been reviewed.    Pulmonary:   Shortness of breath pulm nodules and small pleural effusion on CT 1/26/17   Hepatic/GI:   Liver Disease, (pancreas CA mets to liver) Bowel obstruction/NG tube in place    Stent placed 1/2017, however passed stent at home week after discharge.    OB/GYN/PEDS:  postmenopausal   Neurological:   Headaches    Endocrine:  Endocrine Normal     Psych:   Psychiatric History anxiety depression          Physical Exam  General:  Cachexia    Airway/Jaw/Neck:  Airway Findings: Mouth Opening: Small, but > 3cm General Airway Assessment: Adult, Average  Mallampati: III  TM Distance: Normal, at least 6 cm         Dental:  Dental Findings: Periodontal disease, Mild   Chest/Lungs:  Chest/Lungs Findings: Normal Respiratory Rate, Clear to auscultation     Heart/Vascular:  Heart Findings: Rate: Normal  Rhythm: Regular Rhythm  Sounds: Normal     Abdomen:  Abdomen Findings: Normal    Musculoskeletal:  Musculoskeletal Findings: Normal   Skin:  Skin Findings: Normal    Mental Status:  Mental Status Findings:  Alert and Oriented, Cooperative         Anesthesia Plan  Type of Anesthesia, risks & benefits discussed:  Anesthesia Type:  MAC, general  Patient's Preference:   Intra-op Monitoring Plan: standard ASA monitors  Intra-op Monitoring Plan Comments:   Post Op Pain Control Plan:   Post Op Pain Control Plan Comments:   Induction:   IV  Beta Blocker:  Patient is not currently on a Beta-Blocker (No further documentation required).       Informed Consent: Patient understands risks and agrees with Anesthesia plan.  Questions answered. Anesthesia consent signed with patient.  ASA Score: 3     Day of Surgery Review of History & Physical: I have interviewed and examined the patient. I have reviewed the patient's H&P dated:  There are no significant changes.  H&P update referred to the surgeon.     Anesthesia Plan Notes: Patient appropriate during informed consent, AAOX4, states she is able to sign consent and feels comfortable doing so. All questions answered.         Ready For Surgery From Anesthesia Perspective.

## 2017-03-06 NOTE — PROGRESS NOTES
Pt seen and examined. Gastrograffin enema consistent with known high grade colonic obstruction at the level of the splenic flexure. Discussed colonoscopy and palliative stent placement which she is amenable to. Will obtain consent from her  via phone and plan for colonoscopy and stent placement tomorrow afternoon.

## 2017-03-06 NOTE — TELEPHONE ENCOUNTER
----- Message from Meena Huber sent at 3/6/2017  9:05 AM CST -----  GI- Patients  came by in person to speak to Dr. Stein...regarding the stint. Patients call back number is [phone] Jef Johnson (patient wanted me to stress this is very important)

## 2017-03-06 NOTE — PLAN OF CARE
Problem: Patient Care Overview  Goal: Plan of Care Review  Outcome: Ongoing (interventions implemented as appropriate)  Patient progressing towards discharge.  Patient had no acute events noted throughout the night at this time.  Patient continues to use PRN pain medication while awake.  Patient continuously asks for IV Dilaudid for pain.  Encouraged use of PO Dilaudid as patient was only asking for pain medication every 3 - 4 hours.  Patient was given IV Dilaudid when she arrived back from CT Scan earlier in the shift.  Patient encouraged to call back after 45 minutes of PO Dilaudid for IV Dilaudid if medication is ineffective.  Patient has not called back after the 45 minutes for IV Dilaudid until this AM.  Will continue to monitor.

## 2017-03-06 NOTE — CONSULTS
"Consult Note  Colorectal Surgery    Inpatient consult to Colorectal Surgery  Consult performed by: JOSÉ BENJAMIN  Consult ordered by: RANJEET LESLIE        SUBJECTIVE:     History of Present Illness:  Patient is a 52 y.o. 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 She is followed by Dr Zaman and is currently on chemotherapy.  She was recently hospitalized at [facility] and was found to have a LBO.  Dr Stein with GI performed a colonoscopy that showed a stricture at the splenic flexure which could not be traversed endoscopically but was treated with a fluoroscopic guided stent placement on 1/30/17.  On presentation here primary team was concerned for abdominal distension and recurrent LBO.  Oncology team has requested CRS consult because the patient reports she passed the stent at home about a week after the procedure.  History is obtained partially from mother and  over the phone because patient states she is "under the influence" of pain medications.     Per Dr. Stein op note:       An severe stenosis measuring 2 cm (in length) was found at the        splenic flexure and was non-traversed. This was stented with a 25 mm        x 9cm uncovered colonic stent stent under fluoroscopic guidance.       The exam was otherwise without abnormality.  Impression:           - Severe stenosis at the hepatic flexure - treated                         with a metal stent  Elvis Stein MD  1/30/2017 2:20:44 PM       Oncologic history:  Followed by Dr. Fontenot and Austyn.    S/p celiac neurolysis performed at [facility] 03/22/16.   Pathology was consistent with poorly differentiated adenocarcinoma of the pancreas; imaging demonstrated pulmonary nodules and subcapsular liver lesion.   She was initially started on modified FOLFIRINOX; tumors were slightly more prominent on repeat " "scans but did not fulfill criteria for disease progression. She was therefore started on nabpaclitaxel and gemcitabine (s/p day 2 cycle 8 on 17; was due for day 3 17).             Scheduled Meds:   amlodipine  2.5 mg Oral Daily    bisacodyl  10 mg Rectal Once    enoxaparin  40 mg Subcutaneous Daily    folic acid  1 mg Oral Daily    gabapentin  600 mg Oral TID    lactulose  20 g Oral TID    methadone  10 mg Oral TID    methadone  5 mg Oral BID    polyethylene glycol  17 g Oral BID    senna-docusate 8.6-50 mg  1 tablet Oral BID    vitamin D  5,000 Units Oral Daily     Continuous Infusions:   PRN Meds:HYDROmorphone, HYDROmorphone, omnipaque 350 iohexol, omnipaque, ondansetron, promethazine, ramelteon, simethicone    Review of patient's allergies indicates:  No Known Allergies    Past Medical History:   Diagnosis Date    Cancer associated pain 2017    Chemotherapy follow-up examination 2016    HTN (hypertension)     Pancreatic mass     Paronychia 2017    Sickle cell trait     SOB (shortness of breath) 2017    Swelling of lower extremity 2017     Past Surgical History:   Procedure Laterality Date    BREAST SURGERY      breast reduction     SECTION      COLONOSCOPY N/A 2017    Procedure: COLONOSCOPY;  Surgeon: Elvis Stein MD;  Location: [facility];  Service: Endoscopy;  Laterality: N/A;    TUBAL LIGATION       Family History   Problem Relation Age of Onset    Diabetes Mother     Stroke Father     Hypertension Father     Heart disease Father     Hypertension Brother     Diabetes Brother     Hypertension Brother      Social History   Substance Use Topics    Smoking status: Never Smoker    Smokeless tobacco: Never Used    Alcohol use Yes      Comment: 1 glass once or twice per week        Review of Systems:  As above   No vomiting   Is having bowel movements, "just not enough".  BMs are solid and loose, no blood     OBJECTIVE: "     Vital Signs (Most Recent)  Temp: (P) 98.1 °F (36.7 °C) (03/05/17 1600)  Pulse: (!) (P) 125 (03/05/17 1600)  Resp: (P) 18 (03/05/17 1600)  BP: (P) 132/85 (03/05/17 1600)  SpO2: (P) 100 % (03/05/17 1600)    Vital Signs Range (Last 24H):  Temp:  [98.1 °F (36.7 °C)-98.9 °F (37.2 °C)]   Pulse:  [116-125]   Resp:  [18-20]   BP: (117-142)/(78-92)   SpO2:  [95 %-100 %]     Physical Exam:  NAD but groggy and somewhat sedated, speech slowed.    Cachectic but significant abdominal distension   resp not labored  rrr  Abdomen distended and firm, dull to percussion, no peritonitis     Laboratory:  Lab Results   Component Value Date    WBC 6.07 02/24/2017    HGB 11.7 (L) 02/24/2017    HCT 34.6 (L) 02/24/2017    MCV 81 (L) 02/24/2017     02/24/2017     Lab Results   Component Value Date    CREATININE 0.7 03/01/2017    BUN 7 03/01/2017     (L) 03/01/2017    K 4.3 03/01/2017    CL 94 (L) 03/01/2017    CO2 29 03/01/2017     Lab Results   Component Value Date    ALT 14 03/01/2017    AST 20 03/01/2017    ALKPHOS 96 03/01/2017    BILITOT 1.2 (H) 03/01/2017       Diagnostic Results:  CT Abdomen - report pending     KUB 3/4/17  One view: There is bowel dilatation.  This is a stent for obstruction versus ileus.    KUB 3/5/17 - report pending      ASSESSMENT/PLAN:     51 yo female with metastatic pancreatic cancer with partial colonic obstruction and opioid induced constipation       Requested CT be done with rectal contrast but this was not done so scan is not diagnostic for splenic flexure problem.  Does appear to have a relative narrowing there c/w previously noted stricture but does not appear to be completely obstructed as there is air and stool in the distal colon and rectum.  Proximal ascending and transverse colon very impacted and dilated with stool.  Will FU final read of CT.       Recommend gastrograffin enema Monday morning for diagnosis and therapeutic purposes.      Recommend relistor injections for opioid  induced constipation as well as miralax twice daily     Patient's  requests that only Dr Stein perform scope and repeat stent placement.  After explaining that Dr Stein is not at this facility the patient's  still wishes she be cared for by Dr. Stein.  In that case she would need to be transferred to [place].     Will discuss with colorectal and GI staff in the morning

## 2017-03-06 NOTE — PROGRESS NOTES
Progress Note  Hematology/Oncology    Patient Name: Camilo Johnson  YOB: 1965    Admit Date: 2/24/2017                     LOS: 10    SUBJECTIVE:     Reason for Admission:  Uncontrolled pain    Interval history:   Overnight, afebrile. CT abdomen done without rectal contrast. Gastrograffin enema ordered for this AM. Pt taken for imaging this AM.     Current pain regiment:    -methadone 10mg PO TID    -hydromorphone 10mg PO q3hr PRN for breakthrough    -hydromorphone 3 mg IV q2hr PRN     Patient required (in last 24 hours)   -hydromorphone 10mg PO q4hr PRN for breakthrough x3 during the day yesterday.    - hydromorphone 3mg  IV q2hr in last 24 hours x5      Review of Systems (per last note, pt not in room this AM)  Constitutional: Positive for appetite change and fatigue. Negative for chills and fever.   HENT: Negative for sore throat and trouble swallowing.   Eyes: Negative for pain and visual disturbance.   Respiratory: Negative for cough and shortness of breath.   Cardiovascular: Negative for chest pain and palpitations.   Gastrointestinal: Positive for abdominal pain and nausea but has significantly improved on new regiment. Negative for constipation and vomiting.   Genitourinary: Negative for difficulty urinating and dysuria.   Musculoskeletal: Negative for arthralgias and myalgias.   Skin: Negative for rash and wound.   Neurological: Negative for weakness, numbness and headaches.   OBJECTIVE:     Vital Signs Range (Last 24H):  Temp:  [98.1 °F (36.7 °C)-98.9 °F (37.2 °C)]   Pulse:  [115-127]   Resp:  [18]   BP: (121-134)/(79-92)   SpO2:  [95 %-100 %] Body mass index is 24.9 kg/(m^2).    I & O (Last 24H):    Intake/Output Summary (Last 24 hours) at 03/06/17 0828  Last data filed at 03/06/17 0200   Gross per 24 hour   Intake             1490 ml   Output              500 ml   Net              990 ml     Physical Exam (per last note)  Constitutional: She is oriented to person, place, and time.  She appears well-developed and well-nourished. No distress.   HENT:   Head: Normocephalic and atraumatic.   Mouth/Throat: Oropharynx is clear and moist.   Eyes: Conjunctivae are normal. Pupils are equal, round, and reactive to light.   Cardiovascular: Normal rate, regular rhythm, normal heart sounds and intact distal pulses.   Pulmonary/Chest: Effort normal and breath sounds normal.   Abdominal: Soft. Bowel sounds are normal. She exhibits distension There is tenderness.   Musculoskeletal: She exhibits edema (trace BLE).   Neurological: She is alert and oriented to person, place, and time.   Skin: Skin is warm and dry. No rash noted.   Vitals reviewed.    Medications:  Scheduled:   amlodipine  2.5 mg Oral Daily    bisacodyl  10 mg Rectal Once    enoxaparin  40 mg Subcutaneous Daily    folic acid  1 mg Oral Daily    gabapentin  600 mg Oral TID    lactulose  20 g Oral TID    methadone  10 mg Oral TID    methadone  5 mg Oral BID    polyethylene glycol  17 g Oral BID    senna-docusate 8.6-50 mg  1 tablet Oral BID    vitamin D  5,000 Units Oral Daily       Infusions:       PRN:  HYDROmorphone, HYDROmorphone, omnipaque, ondansetron, promethazine, ramelteon, simethicone    Diagnostic Results:  Lab Results   Component Value Date    WBC 13.07 (H) 03/06/2017    HGB 9.6 (L) 03/06/2017    HCT 28.8 (L) 03/06/2017    MCV 80 (L) 03/06/2017     03/06/2017       Recent Labs  Lab 03/06/17  0444   GLU 91   *   K 4.2   CL 93*   CO2 26   BUN 11   CREATININE 0.7   CALCIUM 9.6     Lab Results   Component Value Date    INR 1.1 01/26/2017    INR 1.0 05/27/2016    INR 1.0 04/09/2016     Lab Results   Component Value Date    HGBA1C 5.8 03/18/2016     No results for input(s): POCTGLUCOSE in the last 72 hours.      ASSESSMENT/PLAN:     Active Hospital Problems    Diagnosis  POA    *Uncontrolled pain [R52]  Yes    Cancer associated pain [G89.3]  Yes     Chronic    Sickle cell trait [D57.3]  Yes     Chronic    Malignant  neoplasm of tail of pancreas [C25.2]  Yes    Essential hypertension [I10]  Yes     Chronic      Resolved Hospital Problems    Diagnosis Date Resolved POA   No resolved problems to display.     * Uncontrolled pain  - Patient with uncontrolled pain on regimen of morphine 12-hr 160mg PO q8hr, hydromorphone 4mg PO q4hr PRN (taking 6 times daily) at home.  - OME equivalent of 576mg in 24hr period as per home regimen. Required additional hydromorphone 2mg IV x 4 while in ED.   - With additional ED doses and dose adjustment, 24 hour oral morphine equivalent ~600. We divided this as 2/3 long-acting and 1/3 short-acting.   -will remain at her current pain regiment: methadone 10mg TID, hydromorphone 10mg PO q3hr PRN for breakthrough with hydromorphone 3mg IV q2hr PRN   -may consider increasing methadone dose every 3rd day  - may need palliative care on board  - f/u CRS recs    Constipation  -yesterday had 3 documented BM.  -KUB completed     Essential hypertension  - Continuing amlodipine 2.5mg PO daily.     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 Zaman in clinic.  - Suspect current pain secondary to malignancy.  - Active chemotherapy with nabpaclitaxel and gemcitabine (s/p day 2 cycle 8 on 02/17/17; was due for day 3 02/24/17).   - Will need to reschedule next cycle after pain better controlled.     Sickle cell trait  - No acute issues at this time. Lower suspicion for sickle cell trait as primary cause of worsening pain.      Cancer associated pain  - As under uncontrolled pain.  - Continuing gabapentin 300mg PO TID as adjunct pain control.    Urinary Changes  -urine is dark, likely 2/2 to morphine use  -patient denies any pain, frequency or urgency  -UA is wnl, CMP indicates normal kidney function  -will continue to monitor    Dispo: pending clinical improvement and pain control  DVT: Enoxaparin 40mg  Diet: Adult  Regular    Staff attestation to follow.    Riddhi Quintero  Internal Medicine, PGY1    ATTENDING NOTE, ONCOLOGY INPATIENT TEAM    As above; events of last 24 hours noted.  Patient seen and examined, chart reviewed.  Appears more comfortable, in NAD.  Lungs are clear to auscultation.  Abdomen is still very distended with mild tenderness.  Labs reviewed.    PLAN  I had a long discussion with her.  We will ask CRS to consider a stent placement.  She will remain on the same methadone regimen and the neurontin for now.  DVT prophylaxis.  We will follow.  Her questions were answered to her satisfaction.        Shahid Spann MD

## 2017-03-06 NOTE — PLAN OF CARE
Problem: Patient Care Overview  Goal: Plan of Care Review  Outcome: Ongoing (interventions implemented as appropriate)  Pt has remained free from injury. Fall precautions maintained. All VSS thus far on shift. Pain medication given as needed. Pain seems to be more in control on shift. Scheduled methadone given. Palliative care consulted. Pt to be NPO after midnight for colonoscopy and poss stent placement. POC reviewed with pt. Will continue to monitor.

## 2017-03-06 NOTE — CONSULTS
Palliative Care Acknowledgement of Consult - .date    Consult received.  Will touch base with team prior to seeing patient.  Full consult to follow.    Thank you for allowing us to be a part of the care of this patient.    Nia Toscano LCSW, [facility] CenterP-SW

## 2017-03-07 ENCOUNTER — SURGERY (OUTPATIENT)
Age: 52
End: 2017-03-07

## 2017-03-07 ENCOUNTER — ANESTHESIA (OUTPATIENT)
Dept: ENDOSCOPY | Facility: HOSPITAL | Age: 52
DRG: 948 | End: 2017-03-07
Payer: COMMERCIAL

## 2017-03-07 PROCEDURE — 99233 SBSQ HOSP IP/OBS HIGH 50: CPT | Mod: ,,, | Performed by: INTERNAL MEDICINE

## 2017-03-07 PROCEDURE — 25000003 PHARM REV CODE 250: Performed by: ANESTHESIOLOGY

## 2017-03-07 PROCEDURE — 25000003 PHARM REV CODE 250: Performed by: NURSE ANESTHETIST, CERTIFIED REGISTERED

## 2017-03-07 PROCEDURE — C1769 GUIDE WIRE: HCPCS | Performed by: COLON & RECTAL SURGERY

## 2017-03-07 PROCEDURE — 63600175 PHARM REV CODE 636 W HCPCS: Performed by: INTERNAL MEDICINE

## 2017-03-07 PROCEDURE — 94761 N-INVAS EAR/PLS OXIMETRY MLT: CPT

## 2017-03-07 PROCEDURE — 25000003 PHARM REV CODE 250: Performed by: STUDENT IN AN ORGANIZED HEALTH CARE EDUCATION/TRAINING PROGRAM

## 2017-03-07 PROCEDURE — 74360 X-RAY GUIDE GI DILATION: CPT | Performed by: COLON & RECTAL SURGERY

## 2017-03-07 PROCEDURE — 37000009 HC ANESTHESIA EA ADD 15 MINS: Performed by: COLON & RECTAL SURGERY

## 2017-03-07 PROCEDURE — 93005 ELECTROCARDIOGRAM TRACING: CPT

## 2017-03-07 PROCEDURE — 37000008 HC ANESTHESIA 1ST 15 MINUTES: Performed by: COLON & RECTAL SURGERY

## 2017-03-07 PROCEDURE — 45389 COLONOSCOPY W/STENT PLCMT: CPT | Performed by: COLON & RECTAL SURGERY

## 2017-03-07 PROCEDURE — 0D7L8DZ DILATION OF TRANSVERSE COLON WITH INTRALUMINAL DEVICE, VIA NATURAL OR ARTIFICIAL OPENING ENDOSCOPIC: ICD-10-PCS | Performed by: COLON & RECTAL SURGERY

## 2017-03-07 PROCEDURE — 20600001 HC STEP DOWN PRIVATE ROOM

## 2017-03-07 PROCEDURE — C1876 STENT, NON-COA/NON-COV W/DEL: HCPCS | Performed by: COLON & RECTAL SURGERY

## 2017-03-07 PROCEDURE — 45389 COLONOSCOPY W/STENT PLCMT: CPT | Mod: 52,,, | Performed by: COLON & RECTAL SURGERY

## 2017-03-07 PROCEDURE — D9220A PRA ANESTHESIA: Mod: CRNA,,, | Performed by: NURSE ANESTHETIST, CERTIFIED REGISTERED

## 2017-03-07 PROCEDURE — 25000003 PHARM REV CODE 250: Performed by: INTERNAL MEDICINE

## 2017-03-07 PROCEDURE — 63600175 PHARM REV CODE 636 W HCPCS: Performed by: NURSE ANESTHETIST, CERTIFIED REGISTERED

## 2017-03-07 PROCEDURE — 93010 ELECTROCARDIOGRAM REPORT: CPT | Mod: ,,, | Performed by: INTERNAL MEDICINE

## 2017-03-07 PROCEDURE — D9220A PRA ANESTHESIA: Mod: ANES,,, | Performed by: ANESTHESIOLOGY

## 2017-03-07 RX ORDER — PROPOFOL 10 MG/ML
VIAL (ML) INTRAVENOUS
Status: DISCONTINUED | OUTPATIENT
Start: 2017-03-07 | End: 2017-03-07

## 2017-03-07 RX ORDER — PROPOFOL 10 MG/ML
VIAL (ML) INTRAVENOUS CONTINUOUS PRN
Status: DISCONTINUED | OUTPATIENT
Start: 2017-03-07 | End: 2017-03-07

## 2017-03-07 RX ORDER — HYDROMORPHONE HYDROCHLORIDE 2 MG/ML
2 INJECTION, SOLUTION INTRAMUSCULAR; INTRAVENOUS; SUBCUTANEOUS EVERY 4 HOURS PRN
Status: DISCONTINUED | OUTPATIENT
Start: 2017-03-07 | End: 2017-03-08

## 2017-03-07 RX ORDER — METHADONE HYDROCHLORIDE 5 MG/1
15 TABLET ORAL 2 TIMES DAILY
Status: DISCONTINUED | OUTPATIENT
Start: 2017-03-07 | End: 2017-03-09 | Stop reason: HOSPADM

## 2017-03-07 RX ORDER — METHADONE HYDROCHLORIDE 10 MG/1
20 TABLET ORAL 3 TIMES DAILY
Status: DISCONTINUED | OUTPATIENT
Start: 2017-03-07 | End: 2017-03-07

## 2017-03-07 RX ORDER — HEPARIN 100 UNIT/ML
5 SYRINGE INTRAVENOUS ONCE
Status: COMPLETED | OUTPATIENT
Start: 2017-03-07 | End: 2017-03-07

## 2017-03-07 RX ORDER — SODIUM CHLORIDE 9 MG/ML
INJECTION, SOLUTION INTRAVENOUS CONTINUOUS PRN
Status: DISCONTINUED | OUTPATIENT
Start: 2017-03-07 | End: 2017-03-07

## 2017-03-07 RX ORDER — METHADONE HYDROCHLORIDE 10 MG/1
10 TABLET ORAL DAILY
Status: DISCONTINUED | OUTPATIENT
Start: 2017-03-08 | End: 2017-03-09 | Stop reason: HOSPADM

## 2017-03-07 RX ORDER — HYDROMORPHONE HYDROCHLORIDE 2 MG/ML
2 INJECTION, SOLUTION INTRAMUSCULAR; INTRAVENOUS; SUBCUTANEOUS
Status: DISCONTINUED | OUTPATIENT
Start: 2017-03-07 | End: 2017-03-07

## 2017-03-07 RX ADMIN — HYDROMORPHONE HYDROCHLORIDE 10 MG: 2 TABLET ORAL at 06:03

## 2017-03-07 RX ADMIN — GABAPENTIN 600 MG: 300 CAPSULE ORAL at 06:03

## 2017-03-07 RX ADMIN — PROPOFOL 100 MG: 10 INJECTION, EMULSION INTRAVENOUS at 12:03

## 2017-03-07 RX ADMIN — VITAMIN D, TAB 1000IU (100/BT) 5000 UNITS: 25 TAB at 09:03

## 2017-03-07 RX ADMIN — FOLIC ACID 1 MG: 1 TABLET ORAL at 09:03

## 2017-03-07 RX ADMIN — METHADONE HYDROCHLORIDE 5 MG: 10 TABLET ORAL at 06:03

## 2017-03-07 RX ADMIN — HYDROMORPHONE HYDROCHLORIDE 2 MG: 2 INJECTION, SOLUTION INTRAMUSCULAR; INTRAVENOUS; SUBCUTANEOUS at 11:03

## 2017-03-07 RX ADMIN — GABAPENTIN 600 MG: 300 CAPSULE ORAL at 03:03

## 2017-03-07 RX ADMIN — SODIUM CHLORIDE 1000 ML: 0.9 INJECTION, SOLUTION INTRAVENOUS at 06:03

## 2017-03-07 RX ADMIN — SODIUM CHLORIDE: 0.9 INJECTION, SOLUTION INTRAVENOUS at 12:03

## 2017-03-07 RX ADMIN — AMLODIPINE BESYLATE 2.5 MG: 2.5 TABLET ORAL at 09:03

## 2017-03-07 RX ADMIN — HEPARIN 500 UNITS: 100 SYRINGE at 02:03

## 2017-03-07 RX ADMIN — HYDROMORPHONE HYDROCHLORIDE 10 MG: 2 TABLET ORAL at 10:03

## 2017-03-07 RX ADMIN — METHADONE HYDROCHLORIDE 20 MG: 10 TABLET ORAL at 03:03

## 2017-03-07 RX ADMIN — GABAPENTIN 600 MG: 300 CAPSULE ORAL at 09:03

## 2017-03-07 RX ADMIN — METHADONE HYDROCHLORIDE 15 MG: 5 TABLET ORAL at 09:03

## 2017-03-07 RX ADMIN — METHADONE HYDROCHLORIDE 10 MG: 10 TABLET ORAL at 06:03

## 2017-03-07 RX ADMIN — PROPOFOL 200 MCG/KG/MIN: 10 INJECTION, EMULSION INTRAVENOUS at 12:03

## 2017-03-07 NOTE — DISCHARGE INSTRUCTIONS
Colonoscopy     A camera attached to a flexible tube with a viewing lens is used to take video pictures.     Colonoscopy is a test to view the inside of your lower digestive tract (colon and rectum). Sometimes it can show the last part of the small intestine (ileum). During the test, small pieces of tissue may be removed for testing. This is called a biopsy. Small growths, such as polyps, may also be removed.   Why is colonoscopy done?  The test is done to help look for colon cancer. And it can help find the source of abdominal pain, bleeding, and changes in bowel habits. It may be needed once a year, depending on factors such as your:  · Age  · Health history  · Family health history  · Symptoms  · Results from any prior colonoscopy  Risks and possible complications  These include:  · Bleeding               · A puncture or tear in the colon   · Risks of anesthesia  · A cancer lesion not being seen  Getting ready   To prepare for the test:  · Talk with your healthcare provider about the risks of the test (see below). Also ask your healthcare provider about alternatives to the test.  · Tell your healthcare provider about any medicines you take. Also tell him or her about any health conditions you may have.  · Make sure your rectum and colon are empty for the test. Follow the diet and bowel prep instructions exactly. If you dont, the test may need to be rescheduled.  · Plan for a friend or family member to drive you home after the test.     Colonoscopy provides an inside view of the entire colon.     You may discuss the results with your doctor right away or at a future visit.  During the test   The test is usually done in the hospital on an outpatient basis. This means you go home the same day. The procedure takes about 30 minutes. During that time:  · You are given relaxing (sedating) medicine through an IV line. You may be drowsy, or fully asleep.  · The healthcare provider will first give you a physical exam to  check for anal and rectal problems.  · Then the anus is lubricated and the scope inserted.  · If you are awake, you may have a feeling similar to needing to have a bowel movement. You may also feel pressure as air is pumped into the colon. Its OK to pass gas during the procedure.  · Biopsy, polyp removal, or other treatments may be done during the test.  After the test   You may have gas right after the test. It can help to try to pass it to help prevent later bloating. Your healthcare provider may discuss the results with you right away. Or you may need to schedule a follow-up visit to talk about the results. After the test, you can go back to your normal eating and other activities. You may be tired from the sedation and need to rest for a few hours.  Date Last Reviewed: 11/1/2016  © 4220-2979 [employer]. [address] [place] [address], [place] [address]. All rights reserved. This information is not intended as a substitute for professional medical care. Always follow your healthcare professional's instructions.

## 2017-03-07 NOTE — CONSULTS
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  Pall med consult is for pain management.    1.  Pain:  Nociceptive somatic due to underlying cancer + neuropathic  -Hold Methadone where current dosing is at - will need to clarify dose as patient has 10TID listed and 5BID  -Decrease IV Dilaudid to 2mg IVq2 prn (=40 OME) - use only as a backup to oral dilaudid  -Hold PO Dilaudid at 10mg poq3 prn (=40 OME) - use 1st line for breakthrough pain  -Gabapentin increased to 600TID by primary team  -Patient would like to consider Elavil prior to adding it on - if patient is amenable, add Elavil 10mg qHS - this will serve as an adjuct neuropathic agent to the Neurontin and Methadone and assist with sleep  -Patient very sleepy on my visit so I am hesitant to make any changes currently    2.  Goals of Care:    -Patient relays hoping to get on a clinical trial - she had emailed Dr. Zaman about this prior to going into the hospital  -Further goals unable to be explored due to patient's sleepiness - I will attempt tomorrow and also ask the  what he is hoping/expecting for  -Will also plan to address advance care planning    3.  Constipation:  High grade colonic obstruction at the splenic flexure  -Palliative stenting + colonoscopy per surgery's note  -Will defer bowel regimen to surgery with LBO - currently she is on Senna, Miralax, and Bisacodyl suppository  -Methadone is fecally excreted.  She will need to have BM's to be safe with this med on board    I will continue to follow.  Thank you for allowing me to be a part of Ms. Franz's care.  Please call with questions.    Signature:  Abigail Chauhan MD, MS, FAAP  Internal Medicine, Pediatrics, Hospice & Palliative  Medicine  Medical Director, Palliative Medicine  [phone]    Time spent:  75 minutes which consisted of pain management and goals of care  > 50% of  min visit spent in chart review, face to face discussion of goals of care,  symptom assessment, coordination of care and emotional support.      SUBJECTIVE:     History of Present Illness:  Patient is a 52 y.o. year old woman with history of pancreatic tail ca with mets to liver/lung who presents with pain crisis.  Pall med consult is for pain management.    Pain/Symptom History:  -Reports dull, aching abdominal pain, generalized with radiation to the back, unable to relay a tolerable level, states the pain awakes her at night  -At home was trying 160TID MS Contin, this was not working  -Reports having a BM this morning  -Denies n/v, sob, cp, dizziness.  -Reports being quite sleepy with the pain meds.  Per review of notes, she has been requesting the IV Dilaudid over the PO Dilaudid    Interval History:  -Primary team increased her MS Contin to 240 TID and then rotated her to Methadone (initially on 5BID, increased to 15-10-15 on Ronnie 3/5)  -Started her on Neurontin 300TID which was increased to 600TID on Sunday, 3/5  -Surgery following for LBO - she had a state placed which has dislodged    Goals of Care:  Per above    Oncologic history:  Former patient of Dr. Fontenot followed with Dr. Zaman in clinic. Diagnosed with pancreatic cancer of the tail with biopsy and celiac neurolysis performed at [facility] 03/22/16. Pathology was consistent with poorly differentiated adenocarcinoma of the pancreas; imaging demonstrated pulmonary nodules and subcapsular liver lesion. She was initially started on modified FOLFIRINOX; tumors were slightly more prominent on repeat scans but did not fulfill criteria for disease progression. She was therefore started on nabpaclitaxel and gemcitabine (s/p day 2 cycle 8 on 02/17/17; was due for day 3 02/24/17).   Oncology Treatment  Plan:   OP ABRAXANE + GEMCITABINE       Past Medical History:   Diagnosis Date    Cancer associated pain 2017    Chemotherapy follow-up examination 2016    HTN (hypertension)     Pancreatic mass     Paronychia 2017    Sickle cell trait     SOB (shortness of breath) 2017    Swelling of lower extremity 2017     Past Surgical History:   Procedure Laterality Date    BREAST SURGERY      breast reduction     SECTION      COLONOSCOPY N/A 2017    Procedure: COLONOSCOPY;  Surgeon: Elvis Stein MD;  Location: [facility];  Service: Endoscopy;  Laterality: N/A;    TUBAL LIGATION       Family History   Problem Relation Age of Onset    Diabetes Mother     Stroke Father     Hypertension Father     Heart disease Father     Hypertension Brother     Diabetes Brother     Hypertension Brother      Review of patient's allergies indicates:  No Known Allergies    Medications:  Continuous Infusions:    Scheduled:    amlodipine  2.5 mg Oral Daily    bisacodyl  10 mg Rectal Once    enoxaparin  40 mg Subcutaneous Daily    folic acid  1 mg Oral Daily    gabapentin  600 mg Oral TID    lactulose  20 g Oral TID    methadone  10 mg Oral TID    methadone  5 mg Oral BID    polyethylene glycol  17 g Oral BID    senna-docusate 8.6-50 mg  1 tablet Oral BID    vitamin D  5,000 Units Oral Daily     PRN Meds: HYDROmorphone, HYDROmorphone, omnipaque, ondansetron, promethazine, simethicone    24h Oral Morphine Equivalents (OME): 500 + Methadone 15-10-15    Bowel Management Plan (BMP): Yes (x  )  NO  (  )    OBJECTIVE:   Symptom Assessment (ESAS 0-10 scale)     ESAS 0 1 2 3 4 5 6 7 8 9 10   Pain         x     Dyspnea x             Anxiety x             Nausea x             Depression  x             Anorexia           x   Fatigue         x     Insomnia       x       Restlessness  x             Agitation x             Constipation     x__ --  ___+   Diarrhea           x__ --  ___+      Comments:     Perfromance Status: PPS Score ( 30 )     Physical Exam:  Vitals: Temp: 98 °F (36.7 °C) (03/07/17 0410)  Pulse: (!) 123 (03/07/17 0410)  Resp: 16 (03/07/17 0410)  BP: 121/78 (03/07/17 0410)  SpO2: (!) 93 % (03/07/17 0410)  General: Quite sleepy on my visit, no acute distress, appeared mostly comfortable  Pulmonary: Non labored,clear to auscultation anteriorly. No secretions, tachypneic at times  Cardiac: normal S1 & S2 w/o rubs/murmurs/gallops.   Abdominal: Distended, bowel sounds difficult to appreciate, +tenderness to palpation diffusely.   Extremities: Moves all extremities x 4. No peripheral edema. 2+ pulses.  Skin: No jaundice, rashes, or visible lesions.  Neurological: Sleepy but follows commands. No focal neuro deficits.   Psych/Mental Status:  Sleepy, no agitation  CAM / Delirium _x_ --  ___+   FAST Stage for Dementia:    Labs:  CBC:   WBC   Date Value Ref Range Status   03/06/2017 13.07 (H) 3.90 - 12.70 K/uL Final     Hemoglobin   Date Value Ref Range Status   03/06/2017 9.6 (L) 12.0 - 16.0 g/dL Final     Hematocrit   Date Value Ref Range Status   03/06/2017 28.8 (L) 37.0 - 48.5 % Final     MCV   Date Value Ref Range Status   03/06/2017 80 (L) 82 - 98 fL Final     Platelets   Date Value Ref Range Status   03/06/2017 168 150 - 350 K/uL Final       BMP: No results for input(s): GLU, NA, K, CL, CO2, BUN, CREATININE, CALCIUM, MG in the last 24 hours.    LFT:   Lab Results   Component Value Date    AST 20 03/06/2017    ALKPHOS 124 03/06/2017    BILITOT 1.2 (H) 03/06/2017       Albumin:   Albumin   Date Value Ref Range Status   03/06/2017 2.8 (L) 3.5 - 5.2 g/dL Final     Protein:   Total Protein   Date Value Ref Range Status   03/06/2017 7.3 6.0 - 8.4 g/dL Final       LACTIC ACID:   Lab Results   Component Value Date    LACTATE 1.1 05/05/2016       Radiology:      Legal/Advanced Directives:  Living Will: No  Resuscitate Status: Full Code  Decision-Making Capacity: Yes  Medical Power of :      Psychosocial/Cultural: Lives in the [facility] with  and 7 yr old son.  Worked prior to cancer diagnosis at the [employer].  Has 2 daughters at [facility].    Spiritual: Strong kiersten based belief, Faith    F- Kiersten and Belief    I - Importance  .  C - Community    A - Address in Care      Problem list:  Active Hospital Problems    Diagnosis  POA    *Uncontrolled pain [R52]  Yes    Cancer associated pain [G89.3]  Yes     Chronic    Sickle cell trait [D57.3]  Yes     Chronic    Malignant neoplasm of tail of pancreas [C25.2]  Yes    Essential hypertension [I10]  Yes     Chronic      Resolved Hospital Problems    Diagnosis Date Resolved POA   No resolved problems to display.

## 2017-03-07 NOTE — NURSING
Notified Dr. Franco regarding HR of 135's and patient request for food. No new orders noted. Will cont to follow patient.

## 2017-03-07 NOTE — ANESTHESIA RELEASE NOTE
"Anesthesia Release from PACU Note    Patient: Camilo Johnson    Procedure(s) Performed: Procedure(s) (LRB):  COLONOSCOPY with STENT (N/A)    Anesthesia type: general    Post pain: Adequate analgesia    Post assessment: no apparent anesthetic complications    Last Vitals:   Visit Vitals    /89    Pulse (!) 115    Temp 36.9 °C (98.4 °F) (Skin)    Resp 14    Ht 5' 4" (1.626 m)    Wt 65.8 kg (145 lb 1 oz)    SpO2 100%    Breastfeeding No    BMI 24.9 kg/m2       Post vital signs: stable    Level of consciousness: awake    Nausea/Vomiting: no nausea/no vomiting    Complications: none    Airway Patency: patent    Respiratory: unassisted       Cardiovascular: stable    Hydration: euvolemic  "

## 2017-03-07 NOTE — ANESTHESIA POSTPROCEDURE EVALUATION
"Anesthesia Post Evaluation    Patient: Camilo Johnson    Procedure(s) Performed: Procedure(s) (LRB):  COLONOSCOPY with STENT (N/A)    Final Anesthesia Type: general  Patient location during evaluation: PACU  Patient participation: Yes- Able to Participate  Level of consciousness: awake and alert  Post-procedure vital signs: reviewed and stable  Pain management: adequate  Airway patency: patent  PONV status at discharge: No PONV  Anesthetic complications: no      Cardiovascular status: blood pressure returned to baseline  Respiratory status: unassisted  Hydration status: euvolemic  Follow-up not needed.        Visit Vitals    /89    Pulse (!) 115    Temp 36.9 °C (98.4 °F) (Skin)    Resp 14    Ht 5' 4" (1.626 m)    Wt 65.8 kg (145 lb 1 oz)    SpO2 100%    Breastfeeding No    BMI 24.9 kg/m2       Pain/Angeli Score: Pain Assessment Performed: Yes (3/7/2017  2:15 PM)  Presence of Pain: denies (3/7/2017  2:15 PM)  Pain Rating Prior to Med Admin: 7 (3/7/2017 12:22 PM)  Pain Rating Post Med Admin: 0 (3/7/2017  7:07 AM)  Angeli Score: 10 (3/7/2017  2:15 PM)      "

## 2017-03-07 NOTE — PLAN OF CARE
Problem: Patient Care Overview  Goal: Plan of Care Review  Outcome: Ongoing (interventions implemented as appropriate)  Pt remained afebrile during night. Pt did complain of pain to abdomen and back, which was relieved with  scheduled methadone 10 mg po. AAXO4.  Pt is NPO at this time for a colonoscopy in the AM. Safety precautions maintained. Pt remained free from injury during night. Bed is in low and locked position with side rails up x 2. Call bell is within reach of pt.

## 2017-03-07 NOTE — PROGRESS NOTES
Pt seen and examined. Consents obtained for colonoscopy today with splenic flexure stent placement.

## 2017-03-07 NOTE — NURSING TRANSFER
Nursing Transfer Note      3/7/2017     Transfer 828a    Transfer via stretcher    Transfer with pct see ticket to ride    Transported by pct    Medicines sent:none  Chart send with patient: yes    Notified: humza    Patient reassessed at:now and upon arrival to unit    Upon arrival to floor:humza cunningham

## 2017-03-07 NOTE — PROGRESS NOTES
[facility]  Adult Nutrition  Consult Note    SUMMARY     Recommendations    Recommendation/Intervention:   1. Advance diet when medically appropriate to regular diet. Encourage pt to consume >75% meals.   2. Continue Boost Plus OS TID for added calories and protein.   Encourage small bites of bland foods.    Goals: Patient to consume > 75% of meals  Nutrition Goal Status: progressing towards goal  Communication of RD Recs: reviewed with RN    Continuum of Care Plan    Referral to Outpatient Services: other (see comments) (Nutrition D/C Planning: PO intake >75% + ONS)    Reason for Assessment    Reason for Assessment: RD follow-up  Diagnosis: cancer diagnosis/related complications, other (see comments) (metastatic pancreatic cancer)  Relevent Medical History: HTN, sickle cell trait, metastatic pancreatic tail cancer to lung and liver   Interdisciplinary Rounds: attended     General Information Comments: Pt NPO for colonoscopy w/ stent placement today. Will follow    Nutrition Prescription Ordered    Current Diet Order: NPO      Oral Nutrition Supplement: Boost Plus     Evaluation of Received Nutrients/Fluid Intake        Oral Fluid (mL): 200 (x24 hours)    IV Fluid (mL): 0    Comments: LBM 3/7        Nutrition Risk Screen     Nutrition Risk Screen: reduced oral intake over the last month    Nutrition/Diet History    Patient Reported Diet/Restrictions/Preferences: general  Typical Food/Fluid Intake: Patient reports fair appetite and PO intake PTA.  Food Preferences: No cultural or Hinduism needs identified at this time.        Factors Affecting Nutritional Intake: decreased appetite, pain     Labs/Tests/Procedures/Meds       Pertinent Labs Reviewed: reviewed  Pertinent Labs Comments: Na 132, K 4.2, Glu 91, TBili 1.2  Pertinent Medications Reviewed: reviewed  Pertinent Medications Comments: folic acid, vitamin D    Physical Findings    Overall Physical Appearance:  (nourished)  Tubes:   (none)  Oral/Mouth Cavity: WDL  Skin: intact    Anthropometrics       Height (inches): 64.02 in  Weight Method: Bed Scale  Weight (kg): 65.8 kg     Ideal Body Weight (IBW), Female: 120.1 lb     % Ideal Body Weight, Female (lb): 120.78 lb  BMI (kg/m2): 24.89  BMI Grade: 18.5-24.9 - normal  Usual Body Weight (UBW), k.1 kg  % Usual Body Weight: 88.8     Weight Loss: unintentional     Estimated/Assessed Needs    Weight Used For Calorie Calculations: 65.8 kg (145 lb 1 oz)   Height (cm): 162.6 cm     Energy Need Method: Kcal/kg (5060-5266 kcal/day (30-35 kcal/kg))     RMR (Elmore-St. Jeor Equation): 1266.59        Weight Used For Protein Calculations: 65.8 kg (145 lb 1 oz)  Protein Requirements: 79-92 g/day (1.2-1.4 g/kg)    Fluid Need Method: RDA Method (1 mL/kcal or per MD)     Monitor and Evaluation    Food and Nutrient Intake: energy intake, food and beverage intake  Food and Nutrient Adminstration: diet order     Physical Activity and Function: nutrition-related ADLs and IADLs  Anthropometric Measurements: weight change  Biochemical Data, Medical Tests and Procedures: electrolyte and renal panel, gastrointestinal profile  Nutrition-Focused Physical Findings: overall appearance    Nutrition Risk    Level of Risk: moderate    Nutrition Follow-Up    RD Follow-up?: Yes    Nutrition Diagnosis  Problem: Increased nutrient needs  Etiology: increased demand for calories and protein  Signs/Symptoms: diagnosis of metastatic cancer  Nutrition Diagnosis Status: continues     Problem: Inadequate energy intake  Etiology: related to decreased appetite and pain  S&S: AEB pt consuming <25% meals   Status: Continues

## 2017-03-07 NOTE — INTERVAL H&P NOTE
The patient has been examined and the H&P has been reviewed:    I concur with the findings and no changes have occurred since H&P was written.    Anesthesia/Surgery risks, benefits and alternative options discussed and understood by patient/family.    Will place colonic stent wit sedation.  I have explained the procedure including indications, alternatives, expected outcomes and potential complications. The patient appears to understand and gives informed consent. The patient is medically ready for surgery.            Active Hospital Problems    Diagnosis  POA    *Uncontrolled pain [R52]  Yes    Cancer associated pain [G89.3]  Yes     Chronic    Sickle cell trait [D57.3]  Yes     Chronic    Malignant neoplasm of tail of pancreas [C25.2]  Yes    Essential hypertension [I10]  Yes     Chronic      Resolved Hospital Problems    Diagnosis Date Resolved POA   No resolved problems to display.

## 2017-03-07 NOTE — PROGRESS NOTES
Progress Note  Hematology/Oncology    Patient Name: Camilo Johnson  YOB: 1965    Admit Date: 2/24/2017                     LOS: 11    SUBJECTIVE:     Reason for Admission:  Uncontrolled pain    Interval history:   Overnight, afebrile. Remains tachycardic in the 120s. 3/6 gastrograffin enema done showed high grade colonic obstruction at splenic flexure. CRS to do colonoscopy with stent placement today. Pt states her pain is improving on current regimen.     Current pain regiment:    -methadone 10mg PO TID    - methadone 5 mg BID   - gabapentin 600 TID   -hydromorphone 10mg PO q3hr PRN for breakthrough    -hydromorphone 2 mg IV q2hr PRN     Patient required (in last 24 hours)   -hydromorphone 10mg PO q4hr PRN for breakthrough x4 during the day yesterday.    - hydromorphone 3mg  IV q2hr in last 24 hours x3      Review of Systems  Constitutional: Positive for appetite change and fatigue. Negative for chills and fever.   HENT: Negative for sore throat and trouble swallowing.   Eyes: Negative for pain and visual disturbance.   Respiratory: Negative for cough and shortness of breath.   Cardiovascular: Negative for chest pain and palpitations.   Gastrointestinal: Positive for abdominal pain and nausea but has significantly improved on new regiment. Negative for constipation and vomiting.   Genitourinary: Negative for difficulty urinating and dysuria.   Musculoskeletal: Negative for arthralgias and myalgias.   Skin: Negative for rash and wound.   Neurological: Negative for weakness, numbness and headaches.   OBJECTIVE:     Vital Signs Range (Last 24H):  Temp:  [98 °F (36.7 °C)-98.7 °F (37.1 °C)]   Pulse:  [115-130]   Resp:  [16-20]   BP: (114-129)/(78-82)   SpO2:  [93 %-97 %] Body mass index is 24.9 kg/(m^2).    I & O (Last 24H):    Intake/Output Summary (Last 24 hours) at 03/07/17 0701  Last data filed at 03/07/17 0400   Gross per 24 hour   Intake              300 ml   Output              800 ml   Net              -500 ml     Physical Exam (per last note)  Constitutional: She is oriented to person, place, and time. She appears well-developed and well-nourished. No distress.   HENT:   Head: Normocephalic and atraumatic.   Mouth/Throat: Oropharynx is clear and moist.   Eyes: Conjunctivae are normal. Pupils are equal, round, and reactive to light.   Cardiovascular: Normal rate, regular rhythm, normal heart sounds and intact distal pulses.   Pulmonary/Chest: Effort normal and breath sounds normal.   Abdominal: Soft. Bowel sounds are normal. She exhibits distension There is tenderness.   Musculoskeletal: She exhibits edema (trace BLE).   Neurological: She is alert and oriented to person, place, and time.   Skin: Skin is warm and dry. No rash noted.   Vitals reviewed.    Medications:  Scheduled:   amlodipine  2.5 mg Oral Daily    bisacodyl  10 mg Rectal Once    enoxaparin  40 mg Subcutaneous Daily    folic acid  1 mg Oral Daily    gabapentin  600 mg Oral TID    lactulose  20 g Oral TID    methadone  10 mg Oral TID    methadone  5 mg Oral BID    polyethylene glycol  17 g Oral BID    senna-docusate 8.6-50 mg  1 tablet Oral BID    vitamin D  5,000 Units Oral Daily       Infusions:       PRN:  HYDROmorphone, HYDROmorphone, omnipaque, ondansetron, promethazine, simethicone    Diagnostic Results:  Lab Results   Component Value Date    WBC 13.07 (H) 03/06/2017    HGB 9.6 (L) 03/06/2017    HCT 28.8 (L) 03/06/2017    MCV 80 (L) 03/06/2017     03/06/2017     No results for input(s): GLU, NA, K, CL, CO2, BUN, CREATININE, CALCIUM, MG in the last 24 hours.  Lab Results   Component Value Date    INR 1.1 01/26/2017    INR 1.0 05/27/2016    INR 1.0 04/09/2016     Lab Results   Component Value Date    HGBA1C 5.8 03/18/2016     No results for input(s): POCTGLUCOSE in the last 72 hours.      ASSESSMENT/PLAN:     Active Hospital Problems    Diagnosis  POA    *Uncontrolled pain [R52]  Yes    Cancer associated pain  [G89.3]  Yes     Chronic    Sickle cell trait [D57.3]  Yes     Chronic    Malignant neoplasm of tail of pancreas [C25.2]  Yes    Essential hypertension [I10]  Yes     Chronic      Resolved Hospital Problems    Diagnosis Date Resolved POA   No resolved problems to display.     * Uncontrolled pain  - Patient with uncontrolled pain on regimen of morphine 12-hr 160mg PO q8hr, hydromorphone 4mg PO q4hr PRN (taking 6 times daily) at home.  - OME equivalent of 576mg in 24hr period as per home regimen. Required additional hydromorphone 2mg IV x 4 while in ED.   - With additional ED doses and dose adjustment, 24 hour oral morphine equivalent ~600. We divided this as 2/3 long-acting and 1/3 short-acting.   -will remain at her current pain regiment: methadone 10mg TID and 5 mg BID, hydromorphone 10mg PO q3hr PRN for breakthrough with hydromorphone 3mg IV q2hr PRN   -may consider increasing methadone dose every 2nd day  -palliative care on board  - CRS recs: colonoscopy w/stent placement 3/7    Constipation  -had 3 documented BM.  -KUB completed     Essential hypertension  - Continuing amlodipine 2.5mg PO daily.     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 Zaman in clinic.  - Suspect current pain secondary to malignancy.  - Active chemotherapy with nabpaclitaxel and gemcitabine (s/p day 2 cycle 8 on 02/17/17; was due for day 3 02/24/17).   - Will need to reschedule next cycle after pain better controlled.     Sickle cell trait  - No acute issues at this time. Lower suspicion for sickle cell trait as primary cause of worsening pain.      Cancer associated pain  - As under uncontrolled pain.  - Continuing gabapentin 600mg PO TID as adjunct pain control.    Urinary Changes  -urine is dark, likely 2/2 to morphine use  -patient denies any pain, frequency or urgency  -UA is wnl, CMP indicates normal kidney function  -will  continue to monitor    Dispo: pending clinical improvement and pain control  DVT: Enoxaparin 40mg, hold for procedure  Diet: Adult Regular      Riddhi Quintero  Internal Medicine, PGY1  Staff attestation to follow      ATTENDING NOTE, ONCOLOGY INPATIENT TEAM    As above; events of last 24 hours noted.  Patient seen and examined, chart reviewed.  Appears more comfortable today, states that her pain is better..  Lungs are clear to auscultation.  Abdomen is soft, but remains distended.  Labs reviewed.    PLAN  Proceed with stent placement today.  Increase methadone to 20 mg TID, continue neurontin at 600 mg TID.  Reduce the IV hydromoprhone to Q 4 hours prn.  Will restart enoxaparin after her procedure.  Her questions were answered to her satisfaction.          Shahid Spann MD

## 2017-03-07 NOTE — TRANSFER OF CARE
"Anesthesia Transfer of Care Note    Patient: Camilo Johnson    Procedure(s) Performed: Procedure(s) (LRB):  COLONOSCOPY with STENT (N/A)    Patient location: PACU    Anesthesia Type: general    Post pain: adequate analgesia    Post assessment: no apparent anesthetic complications and tolerated procedure well    Post vital signs: stable    Level of consciousness: sedated and responds to stimulation    Nausea/Vomiting: no nausea/vomiting    Complications: none          Last vitals:   Visit Vitals    BP (!) 136/91    Pulse (!) 121    Temp 37.3 °C (99.1 °F)    Resp 17    Ht 5' 4" (1.626 m)    Wt 65.8 kg (145 lb 1 oz)    SpO2 95%    Breastfeeding No    BMI 24.9 kg/m2     "

## 2017-03-08 LAB
ALBUMIN SERPL BCP-MCNC: 2.3 G/DL
ALP SERPL-CCNC: 116 U/L
ALT SERPL W/O P-5'-P-CCNC: 11 U/L
ANION GAP SERPL CALC-SCNC: 13 MMOL/L
ANISOCYTOSIS BLD QL SMEAR: SLIGHT
AST SERPL-CCNC: 21 U/L
BASOPHILS NFR BLD: 0 %
BILIRUB SERPL-MCNC: 1.2 MG/DL
BUN SERPL-MCNC: 8 MG/DL
CALCIUM SERPL-MCNC: 8.8 MG/DL
CHLORIDE SERPL-SCNC: 100 MMOL/L
CO2 SERPL-SCNC: 24 MMOL/L
CREAT SERPL-MCNC: 0.6 MG/DL
DIFFERENTIAL METHOD: ABNORMAL
EOSINOPHIL NFR BLD: 0 %
ERYTHROCYTE [DISTWIDTH] IN BLOOD BY AUTOMATED COUNT: 17.1 %
EST. GFR  (AFRICAN AMERICAN): >60 ML/MIN/1.73 M^2
EST. GFR  (NON AFRICAN AMERICAN): >60 ML/MIN/1.73 M^2
GLUCOSE SERPL-MCNC: 55 MG/DL
HCT VFR BLD AUTO: 27.1 %
HGB BLD-MCNC: 9.2 G/DL
HYPOCHROMIA BLD QL SMEAR: ABNORMAL
LYMPHOCYTES NFR BLD: 2 %
MAGNESIUM SERPL-MCNC: 1.7 MG/DL
MCH RBC QN AUTO: 26.6 PG
MCHC RBC AUTO-ENTMCNC: 33.9 %
MCV RBC AUTO: 78 FL
MONOCYTES NFR BLD: 3 %
NEUTROPHILS NFR BLD: 94 %
NEUTS BAND NFR BLD MANUAL: 1 %
OVALOCYTES BLD QL SMEAR: ABNORMAL
PHOSPHATE SERPL-MCNC: 3.4 MG/DL
PLATELET # BLD AUTO: 198 K/UL
PLATELET BLD QL SMEAR: ABNORMAL
PMV BLD AUTO: 9.7 FL
POIKILOCYTOSIS BLD QL SMEAR: SLIGHT
POLYCHROMASIA BLD QL SMEAR: ABNORMAL
POTASSIUM SERPL-SCNC: 3.7 MMOL/L
PROT SERPL-MCNC: 6.5 G/DL
RBC # BLD AUTO: 3.46 M/UL
SODIUM SERPL-SCNC: 137 MMOL/L
TARGETS BLD QL SMEAR: ABNORMAL
WBC # BLD AUTO: 16.61 K/UL

## 2017-03-08 PROCEDURE — 97165 OT EVAL LOW COMPLEX 30 MIN: CPT

## 2017-03-08 PROCEDURE — 63600175 PHARM REV CODE 636 W HCPCS: Performed by: INTERNAL MEDICINE

## 2017-03-08 PROCEDURE — 25000003 PHARM REV CODE 250: Performed by: INTERNAL MEDICINE

## 2017-03-08 PROCEDURE — 25000003 PHARM REV CODE 250: Performed by: STUDENT IN AN ORGANIZED HEALTH CARE EDUCATION/TRAINING PROGRAM

## 2017-03-08 PROCEDURE — 85027 COMPLETE CBC AUTOMATED: CPT

## 2017-03-08 PROCEDURE — 97162 PT EVAL MOD COMPLEX 30 MIN: CPT

## 2017-03-08 PROCEDURE — 83735 ASSAY OF MAGNESIUM: CPT

## 2017-03-08 PROCEDURE — 63600175 PHARM REV CODE 636 W HCPCS: Performed by: STUDENT IN AN ORGANIZED HEALTH CARE EDUCATION/TRAINING PROGRAM

## 2017-03-08 PROCEDURE — 80053 COMPREHEN METABOLIC PANEL: CPT

## 2017-03-08 PROCEDURE — 20600001 HC STEP DOWN PRIVATE ROOM

## 2017-03-08 PROCEDURE — 36415 COLL VENOUS BLD VENIPUNCTURE: CPT

## 2017-03-08 PROCEDURE — 97530 THERAPEUTIC ACTIVITIES: CPT

## 2017-03-08 PROCEDURE — 99233 SBSQ HOSP IP/OBS HIGH 50: CPT | Mod: ,,, | Performed by: INTERNAL MEDICINE

## 2017-03-08 PROCEDURE — 85007 BL SMEAR W/DIFF WBC COUNT: CPT

## 2017-03-08 PROCEDURE — 84100 ASSAY OF PHOSPHORUS: CPT

## 2017-03-08 RX ORDER — METHADONE HYDROCHLORIDE 5 MG/1
15 TABLET ORAL 2 TIMES DAILY
Qty: 90 TABLET | Refills: 0 | Status: SHIPPED | OUTPATIENT
Start: 2017-03-08 | End: 2017-03-08

## 2017-03-08 RX ORDER — METHADONE HYDROCHLORIDE 5 MG/1
15 TABLET ORAL 2 TIMES DAILY
Qty: 90 TABLET | Refills: 0 | Status: SHIPPED | OUTPATIENT
Start: 2017-03-08

## 2017-03-08 RX ORDER — GABAPENTIN 300 MG/1
600 CAPSULE ORAL 3 TIMES DAILY
Qty: 90 CAPSULE | Refills: 0 | Status: SHIPPED | OUTPATIENT
Start: 2017-03-08 | End: 2018-03-08

## 2017-03-08 RX ORDER — HYDROMORPHONE HYDROCHLORIDE 2 MG/1
10 TABLET ORAL
Qty: 90 TABLET | Refills: 0 | Status: SHIPPED | OUTPATIENT
Start: 2017-03-08 | End: 2017-03-09

## 2017-03-08 RX ORDER — RAMELTEON 8 MG/1
8 TABLET ORAL NIGHTLY PRN
Status: DISCONTINUED | OUTPATIENT
Start: 2017-03-08 | End: 2017-03-09 | Stop reason: HOSPADM

## 2017-03-08 RX ORDER — METHADONE HYDROCHLORIDE 10 MG/1
10 TABLET ORAL DAILY
Qty: 14 TABLET | Refills: 0
Start: 2017-03-08 | End: 2017-03-08

## 2017-03-08 RX ORDER — GABAPENTIN 300 MG/1
600 CAPSULE ORAL 3 TIMES DAILY
Qty: 90 CAPSULE | Refills: 0
Start: 2017-03-08 | End: 2017-03-08

## 2017-03-08 RX ORDER — GABAPENTIN 300 MG/1
600 CAPSULE ORAL 3 TIMES DAILY
Qty: 90 CAPSULE | Refills: 0 | OUTPATIENT
Start: 2017-03-08 | End: 2017-03-08

## 2017-03-08 RX ORDER — HYDROMORPHONE HYDROCHLORIDE 2 MG/ML
2 INJECTION, SOLUTION INTRAMUSCULAR; INTRAVENOUS; SUBCUTANEOUS ONCE
Status: COMPLETED | OUTPATIENT
Start: 2017-03-08 | End: 2017-03-08

## 2017-03-08 RX ORDER — METHADONE HYDROCHLORIDE 10 MG/1
10 TABLET ORAL DAILY
Qty: 14 TABLET | Refills: 0 | Status: SHIPPED | OUTPATIENT
Start: 2017-03-08 | End: 2017-03-08

## 2017-03-08 RX ORDER — HYDROMORPHONE HYDROCHLORIDE 2 MG/1
10 TABLET ORAL
Qty: 90 TABLET | Refills: 0
Start: 2017-03-08 | End: 2017-03-08

## 2017-03-08 RX ORDER — HYDROMORPHONE HYDROCHLORIDE 2 MG/1
10 TABLET ORAL
Qty: 90 TABLET | Refills: 0 | Status: SHIPPED | OUTPATIENT
Start: 2017-03-08 | End: 2017-03-08

## 2017-03-08 RX ORDER — METHADONE HYDROCHLORIDE 10 MG/1
10 TABLET ORAL DAILY
Qty: 14 TABLET | Refills: 0 | Status: SHIPPED | OUTPATIENT
Start: 2017-03-08

## 2017-03-08 RX ORDER — LACTULOSE 10 G/15ML
20 SOLUTION ORAL 3 TIMES DAILY PRN
Status: DISCONTINUED | OUTPATIENT
Start: 2017-03-08 | End: 2017-03-09 | Stop reason: HOSPADM

## 2017-03-08 RX ADMIN — ENOXAPARIN SODIUM 40 MG: 100 INJECTION SUBCUTANEOUS at 12:03

## 2017-03-08 RX ADMIN — METHADONE HYDROCHLORIDE 15 MG: 5 TABLET ORAL at 09:03

## 2017-03-08 RX ADMIN — SIMETHICONE CHEW TAB 80 MG 80 MG: 80 TABLET ORAL at 10:03

## 2017-03-08 RX ADMIN — HYDROMORPHONE HYDROCHLORIDE 10 MG: 2 TABLET ORAL at 05:03

## 2017-03-08 RX ADMIN — METHADONE HYDROCHLORIDE 10 MG: 10 TABLET ORAL at 04:03

## 2017-03-08 RX ADMIN — VITAMIN D, TAB 1000IU (100/BT) 5000 UNITS: 25 TAB at 09:03

## 2017-03-08 RX ADMIN — RAMELTEON 8 MG: 8 TABLET, FILM COATED ORAL at 08:03

## 2017-03-08 RX ADMIN — METHADONE HYDROCHLORIDE 15 MG: 5 TABLET ORAL at 08:03

## 2017-03-08 RX ADMIN — GABAPENTIN 600 MG: 300 CAPSULE ORAL at 09:03

## 2017-03-08 RX ADMIN — HYDROMORPHONE HYDROCHLORIDE 10 MG: 2 TABLET ORAL at 09:03

## 2017-03-08 RX ADMIN — HYDROMORPHONE HYDROCHLORIDE 2 MG: 2 INJECTION, SOLUTION INTRAMUSCULAR; INTRAVENOUS; SUBCUTANEOUS at 03:03

## 2017-03-08 RX ADMIN — AMLODIPINE BESYLATE 2.5 MG: 2.5 TABLET ORAL at 09:03

## 2017-03-08 RX ADMIN — FOLIC ACID 1 MG: 1 TABLET ORAL at 09:03

## 2017-03-08 RX ADMIN — HYDROMORPHONE HYDROCHLORIDE 10 MG: 2 TABLET ORAL at 01:03

## 2017-03-08 RX ADMIN — GABAPENTIN 600 MG: 300 CAPSULE ORAL at 05:03

## 2017-03-08 RX ADMIN — GABAPENTIN 600 MG: 300 CAPSULE ORAL at 01:03

## 2017-03-08 NOTE — PT/OT/SLP EVAL
Occupational Therapy  Evaluation    Camilo Johnson   MRN: [id number]   Admitting Diagnosis: Uncontrolled pain    OT Date of Treatment: 17   OT Start Time: 1106  OT Stop Time: 1140  OT Total Time (min): 34 min    Billable Minutes:  Evaluation 34 minutes    Diagnosis: Uncontrolled pain       Past Medical History:   Diagnosis Date    Cancer associated pain 2017    Chemotherapy follow-up examination 2016    HTN (hypertension)     Pancreatic mass     Paronychia 2017    Sickle cell trait     SOB (shortness of breath) 2017    Swelling of lower extremity 2017      Past Surgical History:   Procedure Laterality Date    BREAST SURGERY      breast reduction     SECTION      COLONOSCOPY N/A 2017    Procedure: COLONOSCOPY;  Surgeon: Elvis Stein MD;  Location: [facility] ENDO;  Service: Endoscopy;  Laterality: N/A;    COLONOSCOPY N/A 3/7/2017    Procedure: COLONOSCOPY with STENT;  Surgeon: Jan De Leon MD;  Location: [facility] ENDO ([address] [place] [address]);  Service: Endoscopy;  Laterality: N/A;    TUBAL LIGATION         Referring physician: ÓSCAR Spann  Date referred to OT: 3/8/2017    General Precautions: Standard, fall  Orthopedic Precautions: N/A  Braces: N/A          Patient History:  Living Environment  Lives With: spouse  Living Arrangements: house  Home Accessibility: stairs to enter home  Number of Stairs to Enter Home: 3  Stair Railings at Home: none  Transportation Available: family or friend will provide  Living Environment Comment: Pt lives in a 2sh w/ spouse and 3 steps to enter. Pt is generally alone during the day. Pt required assist for ADL's over the last 2 months. Pt has elevator access within the home. Pt has a built in bench in shower  Equipment Currently Used at Home: none    Prior level of function:   Bed Mobility/Transfers: needs assist  Grooming: needs assist  Bathing: needs assist  Upper Body Dressing: needs assist  Lower Body Dressing: needs  assist  Toileting: needs assist  Home Management Skills: needs assist        Dominant hand: right    Subjective:  Communicated with nurse prior to session.  Pt agreeable to skilled OT services.  Chief Complaint: pain in R abdomen  Patient/Family stated goals: return home    Pain Ratin/10              Pain Rating Post-Intervention: 0/10    Objective:    Pt received toileting in bathroom.     Cognitive Exam:  Oriented to: Person, Place, Time and Situation  Follows Commands/attention: Follows multistep  commands  Communication: clear/fluent  Memory:  No Deficits noted  Safety awareness/insight to disability: intact  Coping skills/emotional control: pt appeared drowsy/ sedated    Visual/perceptual:  Intact    Physical Exam:  Postural examination/scapula alignment: Rounded shoulder  Skin integrity: Visible skin intact  Edema: None noted     Sensation:   Intact    Upper Extremity Range of Motion:  Right Upper Extremity: WFL  Left Upper Extremity: Deficits: 60 degrees shldr flx    Upper Extremity Strength:  Right Upper Extremity: WFL except shldr flx 3/5  Left Upper Extremity: WFL except shldr flx not tested d/t lack of AROM   Strength: WFL    Fine motor coordination:   Intact    Gross motor coordination: WFL    Functional Mobility:  Bed Mobility:  Rolling/Turning to Left: Stand by assistance (HOB elevated )  Scooting/Bridging: Stand by Assistance  Supine to Sit: Stand by Assistance  Sit to Supine: Stand by Assistance    Transfers:  Sit <> Stand Assistance: Supervision  Sit <> Stand Assistive Device: No Assistive Device  Bed <> Chair Technique: Stand Pivot  Bed <> Chair Transfer Assistance: Contact Guard Assistance  Bed <> Chair Assistive Device: Rolling Walker    Functional Ambulation: Pt ambulated 85 ft at cga w/ 2ww. Pt required verbal cues to increase KACIE.     Activities of Daily Living:  UE Dressing Level of Assistance: Set-up Assistance (maryjane cummings)  LE Dressing Level of Assistance: Supervision  "(donned underwear )      Balance:   Static Sit: GOOD-: Takes MODERATE challenges from all directions but inconsistently  Dynamic Sit: GOOD-: Maintains balance through MODERATE excursions of active trunk movement,     Static Stand: GOOD-: Takes MODERATE challenges from all directions inconsistently  Dynamic stand: FAIR: Needs CONTACT GUARD during gait    Therapeutic Activities and Exercises:  Pt and pt's spouse educated on POC and D/C planning.  Pt's spouse educated on home environment recommendations for safety w/ oob activity for the pt.    AM-PAC 6 CLICK ADL  How much help from another person does this patient currently need?  1 = Unable, Total/Dependent Assistance  2 = A lot, Maximum/Moderate Assistance  3 = A little, Minimum/Contact Guard/Supervision  4 = None, Modified [place]/Independent    Putting on and taking off regular lower body clothing? : 4  Bathing (including washing, rinsing, drying)?: 3  Toileting, which includes using toilet, bedpan, or urinal? : 3  Putting on and taking off regular upper body clothing?: 3  Taking care of personal grooming such as brushing teeth?: 4  Eating meals?: 4  Total Score: 21    AM-PAC Raw Score CMS "G-Code Modifier Level of Impairment Assistance   6 % Total / Unable   7 - 9 CM 80 - 100% Maximal Assist   10 - 14 CL 60 - 80% Moderate Assist   15 - 19 CK 40 - 60% Moderate Assist   20 - 22 CJ 20 - 40% Minimal Assist   23 CI 1-20% SBA / CGA   24 CH 0% Independent/ Mod I       Patient left up in chair with spouse present    Assessment:  Camilo Johnson is a 52 y.o. female with a medical diagnosis of Uncontrolled pain and presents with impairments listed below. Pt would benefit from skilled OT services to improve independence and overall occupational functioning.    Rehab identified problem list/impairments: Rehab identified problem list/impairments: weakness, impaired endurance, impaired functional mobilty, impaired self care skills, decreased upper extremity " function, other (comment) (drowsiness from required high does of pain meds)    Rehab potential is fair.    Activity tolerance: Good    Discharge recommendations: Discharge Facility/Level Of Care Needs: home with home health     Barriers to discharge: Barriers to Discharge: Decreased caregiver support    Equipment recommendations: bedside commode, walker, rolling     GOALS:   Occupational Therapy Goals        Problem: Occupational Therapy Goal    Goal Priority Disciplines Outcome Interventions   Occupational Therapy Goal     OT, PT/OT     Description:  Goals to be met by: 3/15/2017     Patient will increase functional independence with ADLs by performing:    UE Dressing with Supervision.  LE Dressing with Stand-by Assistance.  Grooming while standing with Stand-by Assistance.  Toileting from toilet with Stand-by Assistance for hygiene and clothing management.   Toilet transfer to toilet with Stand-by Assistance.  Increase BUE gross strength to 4/5 in preparation to perform ADL's and functional tasks while indep adhering to HEP.                PLAN:  Patient to be seen 4 x/week to address the above listed problems via self-care/home management, therapeutic activities, therapeutic exercises  Plan of Care expires: 04/08/17  Plan of Care reviewed with: patient, spouse    Silvano ÓSCAR Nieto, OT  03/08/2017

## 2017-03-08 NOTE — PLAN OF CARE
Problem: Patient Care Overview  Goal: Plan of Care Review  Outcome: Ongoing (interventions implemented as appropriate)  Pt remained afebrile during night. Pt did complain of pain to abdomen and back, which was relieved with scheduled methadone 15 mg po. AAXO4. Safety precautions maintained. Pt remained free from injury during night. Bed is in low and locked position with side rails up x 2. Call bell is within reach of pt.

## 2017-03-08 NOTE — PLAN OF CARE
Problem: Physical Therapy Goal  Goal: Physical Therapy Goal  Goals to be met by: 3/18/2017     Patient will increase functional independence with mobility by performin. Supine to sit with Stand-by Assistance  2. Sit to supine with Stand-by Assistance  3. Sit to stand transfer with Supervision  4. Bed to chair transfer with Supervision using Rolling Walker  5. Gait x 150 feet with Supervision using Rolling Walker.   Outcome: Ongoing (interventions implemented as appropriate)  PT evaluation complete. POC initiated.

## 2017-03-08 NOTE — PROGRESS NOTES
Pt stating she cannot move her left arm at this time. Pt is unable to hold arm out without arm falling. No numbness or tingling reported.  Right arm is WNL. , pupils, and orientation WNL. Dr. Lara notified. Md is coming to see pt now. Nurse will continue to monitor pt.

## 2017-03-08 NOTE — PROGRESS NOTES
"Progress Note  Hematology/Oncology    Patient Name: Camilo Johnson  YOB: 1965    Admit Date: 2/24/2017                     LOS: 12    SUBJECTIVE:     Reason for Admission:  Uncontrolled pain    Interval history:   Overnight, afebrile. Remains tachycardic in the 120s up to 135 yesterday, asymptomatic and other VSS. STAT EKG showed sinus tachycardia. Gave 1 L NS bolus. HR today 119. Overnight resident called for pt being "unable to move left arm." Felt to be deltoid mm related, xray unlikely to be revealing given no pain or trauma (see significant note for details).   3/7  CRS did colonoscopy with stent placement. Stool passed through stent.     Current pain regiment:    -methadone 10mg PO TID    - methadone 5 mg BID   - gabapentin 600 TID   -hydromorphone 10mg PO q3hr PRN for breakthrough    -hydromorphone 2 mg IV q4hr PRN     Patient required (in last 24 hours)   -hydromorphone 10mg PO q3hr PRN for breakthrough x2 during the day yesterday.    - hydromorphone 2mg  IV q4hr in last 24 hours x1      Review of Systems  Constitutional: Positive for appetite change and fatigue. Negative for chills and fever.   HENT: Negative for sore throat and trouble swallowing.   Eyes: Negative for pain and visual disturbance.   Respiratory: Negative for cough and shortness of breath.   Cardiovascular: Negative for chest pain and palpitations.   Gastrointestinal: Positive for abdominal pain and nausea but has significantly improved on new regiment. Negative for constipation and vomiting.   Genitourinary: Negative for difficulty urinating and dysuria.   Musculoskeletal: Negative for arthralgias and myalgias.   Skin: Negative for rash and wound.   Neurological: Negative for weakness, numbness and headaches.   OBJECTIVE:     Vital Signs Range (Last 24H):  Temp:  [97.9 °F (36.6 °C)-100.1 °F (37.8 °C)]   Pulse:  [107-135]   Resp:  [14-20]   BP: (112-148)/(68-97)   SpO2:  [93 %-100 %] Body mass index is 24.9 " kg/(m^2).    I & O (Last 24H):    Intake/Output Summary (Last 24 hours) at 03/08/17 0657  Last data filed at 03/08/17 0600   Gross per 24 hour   Intake              850 ml   Output              450 ml   Net              400 ml     Physical Exam (per last note)  Constitutional: She is oriented to person, place, and time. She appears well-developed and well-nourished. No distress.   HENT:   Head: Normocephalic and atraumatic.   Mouth/Throat: Oropharynx is clear and moist.   Eyes: Conjunctivae are normal. Pupils are equal, round, and reactive to light.   Cardiovascular: Normal rate, regular rhythm, normal heart sounds and intact distal pulses.   Pulmonary/Chest: Effort normal and breath sounds normal.   Abdominal: Soft. Bowel sounds are normal. She exhibits distension There is tenderness.   Musculoskeletal: She exhibits edema (1+BLE).   Neurological: She is alert and oriented to person, place, and time.   Skin: Skin is warm and dry. No rash noted.   Vitals reviewed.    Medications:  Scheduled:   amlodipine  2.5 mg Oral Daily    bisacodyl  10 mg Rectal Once    enoxaparin  40 mg Subcutaneous Daily    folic acid  1 mg Oral Daily    gabapentin  600 mg Oral TID    lactulose  20 g Oral TID    methadone  10 mg Oral Daily    methadone  15 mg Oral BID    polyethylene glycol  17 g Oral BID    senna-docusate 8.6-50 mg  1 tablet Oral BID    vitamin D  5,000 Units Oral Daily       Infusions:       PRN:  HYDROmorphone, HYDROmorphone, omnipaque, ondansetron, promethazine, simethicone    Diagnostic Results:  Lab Results   Component Value Date    WBC 16.61 (H) 03/08/2017    HGB 9.2 (L) 03/08/2017    HCT 27.1 (L) 03/08/2017    MCV 78 (L) 03/08/2017     03/08/2017       Recent Labs  Lab 03/08/17  0443   GLU 55*      K 3.7      CO2 24   BUN 8   CREATININE 0.6   CALCIUM 8.8     Lab Results   Component Value Date    INR 1.1 01/26/2017    INR 1.0 05/27/2016    INR 1.0 04/09/2016     Lab Results   Component Value  Date    HGBA1C 5.8 03/18/2016     No results for input(s): POCTGLUCOSE in the last 72 hours.      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     * Uncontrolled pain  - Patient with uncontrolled pain on regimen of morphine 12-hr 160mg PO q8hr, hydromorphone 4mg PO q4hr PRN (taking 6 times daily) at home.  - OME equivalent of 576mg in 24hr period as per home regimen. Required additional hydromorphone 2mg IV x 4 while in ED.   - With additional ED doses and dose adjustment, 24 hour oral morphine equivalent ~600. We divided this as 2/3 long-acting and 1/3 short-acting.   -will remain at her current pain regiment: methadone 10mg TID and 5 mg BID, hydromorphone 10mg PO q3hr PRN for breakthrough with hydromorphone 2mg IV q4hr PRN   -may consider increasing methadone dose every 2nd day  -palliative care on board  - CRS recs: colonoscopy w/stent placement 3/7, stool passed through stent    Constipation  -had 3 documented BM.  -KUB completed     Essential hypertension  - Continuing amlodipine 2.5mg PO daily.     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 Zaman in clinic.  - Suspect current pain secondary to malignancy.  - Active chemotherapy with nabpaclitaxel and gemcitabine (s/p day 2 cycle 8 on 02/17/17; was due for day 3 02/24/17).   - Will need to reschedule next cycle after pain better controlled.     Sickle cell trait  - No acute issues at this time. Lower suspicion for sickle cell trait as primary cause of worsening pain.      Cancer associated pain  - As under  uncontrolled pain.  - Continuing gabapentin 600mg PO TID as adjunct pain control.    Urinary Changes  -urine is dark, likely 2/2 to morphine use  -patient denies any pain, frequency or urgency  -UA is wnl, CMP indicates normal kidney function  -will continue to monitor    Dispo: pending clinical improvement and pain control  DVT: Enoxaparin 40mg  Diet: Adult Regular      Riddhidustin Quintero  Internal Medicine, PGY1  Staff attestation to follow        ATTENDING NOTE, ONCOLOGY INPATIENT TEAM    As above; events of last 24 hours noted.  Patient seen and examined, chart reviewed.  Appears more comfortable, in NAD.  Lungs are clear to auscultation.  Abdomen is soft, nontender and appears less distended since her stenting procedure..  Labs reviewed.    PLAN  Repeat abdominal x ray today.  D/V IV dilaudid, continue with oral prn analgesics only.  PT/OT  Continue enoxaparin.  Tentative discharge in am.  Her questions were answered to her satisfaction.      Shahid Spann MD

## 2017-03-08 NOTE — PROGRESS NOTES
Colon and Rectal Surgery    Saw Ms. Franz briefly this morning as she was using the restroom. Says she feels much better, passing multiple stools with decreased abdominal distention.    Vitals and labs stable, bit of a bump in leukocytosis but afebrile with baseline tachycardia.    Not examined this morning.    Would advance diet and continue to monitor for stool passage.  Dispo per primary.

## 2017-03-08 NOTE — PLAN OF CARE
Problem: Occupational Therapy Goal  Goal: Occupational Therapy Goal  Goals to be met by: 3/15/2017     Patient will increase functional independence with ADLs by performing:    UE Dressing with Supervision.  LE Dressing with Stand-by Assistance.  Grooming while standing with Stand-by Assistance.  Toileting from toilet with Stand-by Assistance for hygiene and clothing management.   Toilet transfer to toilet with Stand-by Assistance.  Increase BUE gross strength to 4/5 in preparation to perform ADLs and functional tasks while indep adhering to HEP.  Continue OT POC    Comments:   Silvano Nieto OTR/L  3/8/2017

## 2017-03-08 NOTE — PT/OT/SLP EVAL
Physical Therapy  Evaluation    Camilo Johnson   MRN: [id number]   Admitting Diagnosis: Uncontrolled pain    PT Received On: 17  PT Start Time: 1105     PT Stop Time: 1138    PT Total Time (min): 33 min       Billable Minutes:  Evaluation 23 and Therapeutic Activity 10    Diagnosis: Uncontrolled pain      Past Medical History:   Diagnosis Date    Cancer associated pain 2017    Chemotherapy follow-up examination 2016    HTN (hypertension)     Pancreatic mass     Paronychia 2017    Sickle cell trait     SOB (shortness of breath) 2017    Swelling of lower extremity 2017      Past Surgical History:   Procedure Laterality Date    BREAST SURGERY      breast reduction     SECTION      COLONOSCOPY N/A 2017    Procedure: COLONOSCOPY;  Surgeon: Elvis Stein MD;  Location: [facility] ENDO;  Service: Endoscopy;  Laterality: N/A;    COLONOSCOPY N/A 3/7/2017    Procedure: COLONOSCOPY with STENT;  Surgeon: Jan De Leon MD;  Location: [facility] ENDO ([address] [place] [address]);  Service: Endoscopy;  Laterality: N/A;    TUBAL LIGATION         Referring physician: Maria Ines  Date referred to PT: 3/8/2017    General Precautions: Standard, fall  Orthopedic Precautions: N/A   Braces: N/A       Do you have any cultural, spiritual, Pentec[employer]al conflicts, given your current situation?: none    Patient History:  Lives With: spouse  Living Arrangements: house  Home Accessibility: stairs to enter home  Number of Stairs to Enter Home: 3  Stair Railings at Home: none  Transportation Available: family or friend will provide  Living Environment Comment: Pt lives with her  in a multi-story house w/ 3 RITA. Pt's  states that they have elevator access within the home. Pt is generally home during the day with her daughters coming over as needed to assist with ADLs. Pt required assist for ADLs over the last 2 months. Pt has a walk-in shower with built in bench in shower.   Equipment Currently  Used at Home: none      Previous Level of Function:  Ambulation Skills: independent  Transfer Skills: independent  ADL Skills: needs assist  Work/Leisure Activity: needs assist    Subjective:  Communicated with RN prior to session.  Pt agreeable to evaluation.     Chief Complaint: stomach pain   Patient goals: return home    Pain Ratin/10   Location - Orientation: generalized  Location: abdomen  Pain Addressed: Distraction  Pain Rating Post-Intervention:  (Pt stated that pain did not increased during OOB activities. )    Objective:   Pt found using the restroom.      Cognitive Exam:  Oriented to: Person, Place and Situation    Follows Commands/attention: Follows multistep  commands  Communication: clear/fluent  Safety awareness/insight to disability: impaired    Physical Exam:  Postural examination/scapula alignment: Rounded shoulder and Head forward    Skin integrity: Visible skin intact  Edema: None noted BLE    Sensation:   Intact    Lower Extremity Range of Motion:  Right Lower Extremity: WFL  Left Lower Extremity: WFL    Lower Extremity Strength:  Right Lower Extremity: Deficits: grossly 4/5  Left Lower Extremity: Deficits: grossly 4/5    Gross motor coordination: WFL    Functional Mobility:  Bed Mobility:  Scooting/Bridging: Stand by Assistance  Supine to Sit: Stand by Assistance  Sit to Supine: Stand by Assistance    Transfers:  Sit <> Stand Assistance: Contact Guard Assistance  Sit <> Stand Assistive Device: Rolling Walker    Gait:   Gait Distance: Pt ambulated 85ft using RW and CGA. Pt given cues for hip extension and to increase step width.   Assistance 1: Contact Guard Assistance  Gait Assistive Device: Rolling walker  Gait Pattern: 3-point gait  Gait Deviation(s): decreased jamar, increased time in double stance, decreased velocity of limb motion, decreased step length, decreased stride length    Stairs: Not assessed.     Balance:   Static Sit: FAIR: Maintains without assist, but unable to take any  challenges   Dynamic Sit: FAIR: Cannot move trunk without losing balance  Static Stand: FAIR: Maintains without assist but unable to take challenges  Dynamic stand: FAIR: Needs CONTACT GUARD during gait    Therapeutic Activities and Exercises:  PT evaluation performed.   White board updated.   Pt educated on role of PT and POC.   Pt educated on energy conservation techniques.   Pt educated on D/C recommendations and DME needs.     AM-PAC 6 CLICK MOBILITY  How much help from another person does this patient currently need?   1 = Unable, Total/Dependent Assistance  2 = A lot, Maximum/Moderate Assistance  3 = A little, Minimum/Contact Guard/Supervision  4 = None, Modified [place]/Independent    Turning over in bed (including adjusting bedclothes, sheets and blankets)?: 4  Sitting down on and standing up from a chair with arms (e.g., wheelchair, bedside commode, etc.): 3  Moving from lying on back to sitting on the side of the bed?: 4  Moving to and from a bed to a chair (including a wheelchair)?: 3  Need to walk in hospital room?: 3  Climbing 3-5 steps with a railing?: 2  Total Score: 19     AM-PAC Raw Score CMS G-Code Modifier Level of Impairment Assistance   6 % Total / Unable   7 - 9 CM 80 - 100% Maximal Assist   10 - 14 CL 60 - 80% Moderate Assist   15 - 19 CK 40 - 60% Moderate Assist   20 - 22 CJ 20 - 40% Minimal Assist   23 CI 1-20% SBA / CGA   24 CH 0% Independent/ Mod I     Patient left up in chair with call button in reach and  present.    Assessment:   Camilo Johnson is a 52 y.o. female with a medical diagnosis of Uncontrolled pain and presents with the deficits listed below. Pt tolerated evaluation well today. Pt exhibited decreased endurance during ambulation. Pt would benefit from HHPT and 24/7 supervision upon discharge. Pt is a good candidate for skilled therapy services to address the deficits listed below and increase functional independence.     Rehab identified problem  list/impairments: Rehab identified problem list/impairments: weakness, impaired endurance, impaired self care skills, impaired functional mobilty, gait instability, impaired balance, decreased lower extremity function, pain    Rehab potential is fair.    Activity tolerance: Fair    Discharge recommendations: Discharge Facility/Level Of Care Needs: home health PT     Barriers to discharge: Barriers to Discharge: Decreased caregiver support    Equipment recommendations: Equipment Needed After Discharge: bedside commode, walker, rolling     GOALS:   Physical Therapy Goals        Problem: Physical Therapy Goal    Goal Priority Disciplines Outcome Goal Variances Interventions   Physical Therapy Goal     PT/OT, PT Ongoing (interventions implemented as appropriate)     Description:  Goals to be met by: 3/18/2017     Patient will increase functional independence with mobility by performin. Supine to sit with Stand-by Assistance  2. Sit to supine with Stand-by Assistance  3. Sit to stand transfer with Supervision  4. Bed to chair transfer with Supervision using Rolling Walker  5. Gait  x 150 feet with Supervision using Rolling Walker.                 PLAN:    Patient to be seen 4 x/week to address the above listed problems via self-care/home management, therapeutic activities, therapeutic exercises  Plan of Care expires: 17  Plan of Care reviewed with: patient, spouse    Brittney Small, SPT  2017

## 2017-03-08 NOTE — SIGNIFICANT EVENT
"Called to bedside by nursing as pt "unable to move left arm."     On examination pt notes that she has no changes in sensation, no pain, no paresthesias. States that her arm was like this when she awoke at ~03:00 but prior to going to sleep was normal. Pt is otherwise in her usual state of health.    CNII-XII intact.  strength equal bilaterally. R arm normal and without deficits. L arm with 5/5 strength distally, 4/5 strength in biceps and triceps. 2/5 strength of shoulder abduction and 4/5 of adduction. Reflexes 2+ bilaterally. Has mild tenderness in cervical region with L > R, along with mild tenderness along left trapezius. Otherwise no tenderness to palpation in BUE.     Unclear etiology of (primarily deltoid) left shoulder weakness. Highly unlikely to represent stroke. ?Nerve impingement. Given no pain or history of trauma, unlikely that xray will be revealing.     Will defer further workup to day team assuming shoulder weakness does not resolve spontaneously.    Marcos Lara MD  PGY-3  "

## 2017-03-08 NOTE — PLAN OF CARE
Problem: Patient Care Overview  Goal: Plan of Care Review  Outcome: Ongoing (interventions implemented as appropriate)  Patient had no falls this shift. S/P colonoscopy with stent placement. She was tachycardic, with a temperature that ran to 100.1. Gave bolus of NS. Watery diarrhea secondary to prep. Abd distended, BS hypoactive. Continue to monitor.

## 2017-03-08 NOTE — PROGRESS NOTES
Contacted to pharmacy to verify if they have the current doses of methadone in stock in anticipation of patient's discharge tomorrow. Both dosages are in stock.

## 2017-03-08 NOTE — NURSING
Patient called requesting pain. Pain 8/10. Paged Med/Onc. Will discuss with team regarding pain management.

## 2017-03-08 NOTE — PLAN OF CARE
Problem: Patient Care Overview  Goal: Plan of Care Review  Outcome: Ongoing (interventions implemented as appropriate)  Patient had no falls during shift. Did not eat breakfast or lunch. Drank adequate amount of fluid. Several watery BMs 3/8/2017. Reports dull pain in LLQ of abd. Hypoactive BS. O2 sats around 94% today on RA. RUL and RLL breath sounds are diminished. Afebrile this shift.  Patient guarding abd when in pain, patient more awake compared to yesterday. Sat up in chair.  Will cont to monitor

## 2017-03-08 NOTE — PROGRESS NOTES
03/07/17 1903       Port A Cath Single Lumen 01/28/17 1330 left subclavian   Placement Date/Time: 01/28/17 1330   Hand Hygiene: Performed  Skin Antisepsis: chlorhexidine (without alcohol)  Location: left subclavian  Insertion attempts (enter comment if more than 2 attempts): 1  Patient Tolerance: Insertion: tolerated well   Needle Removal Date 03/07/17   Needle Removal Time 1915   Deaccessed By jennifer duncan   Patient c/o pain at PAC site. Inspected and noted surrounding area to be taunt. Stopped ivfs. De ccessed port. Noted white fluid gushing out of access point. Slight redness noted. IVF attempted x1 unsuccessful. Notified oncoming nurseBrandyn.

## 2017-03-09 VITALS
BODY MASS INDEX: 24.77 KG/M2 | HEART RATE: 115 BPM | WEIGHT: 145.06 LBS | OXYGEN SATURATION: 96 % | HEIGHT: 64 IN | SYSTOLIC BLOOD PRESSURE: 129 MMHG | DIASTOLIC BLOOD PRESSURE: 86 MMHG | TEMPERATURE: 99 F | RESPIRATION RATE: 19 BRPM

## 2017-03-09 PROCEDURE — 25000003 PHARM REV CODE 250: Performed by: STUDENT IN AN ORGANIZED HEALTH CARE EDUCATION/TRAINING PROGRAM

## 2017-03-09 PROCEDURE — 25000003 PHARM REV CODE 250: Performed by: INTERNAL MEDICINE

## 2017-03-09 PROCEDURE — 99356 PR PROLONGED SERV,INPATIENT,1ST HR: CPT | Mod: ,,, | Performed by: INTERNAL MEDICINE

## 2017-03-09 PROCEDURE — 99238 HOSP IP/OBS DSCHRG MGMT 30/<: CPT | Mod: ,,, | Performed by: INTERNAL MEDICINE

## 2017-03-09 PROCEDURE — 63600175 PHARM REV CODE 636 W HCPCS: Performed by: INTERNAL MEDICINE

## 2017-03-09 PROCEDURE — 99233 SBSQ HOSP IP/OBS HIGH 50: CPT | Mod: ,,, | Performed by: INTERNAL MEDICINE

## 2017-03-09 RX ORDER — HYDROMORPHONE HYDROCHLORIDE 2 MG/1
8 TABLET ORAL
Qty: 60 TABLET | Refills: 0 | Status: SHIPPED | OUTPATIENT
Start: 2017-03-09 | End: 2017-03-09

## 2017-03-09 RX ORDER — HYDROMORPHONE HYDROCHLORIDE 2 MG/1
8 TABLET ORAL
Qty: 60 TABLET | Refills: 0 | Status: SHIPPED | OUTPATIENT
Start: 2017-03-09 | End: 2017-03-13 | Stop reason: DRUGHIGH

## 2017-03-09 RX ORDER — HYDROMORPHONE HYDROCHLORIDE 2 MG/1
8 TABLET ORAL
Qty: 60 TABLET | Refills: 0
Start: 2017-03-09 | End: 2017-03-09

## 2017-03-09 RX ORDER — MIRTAZAPINE 7.5 MG/1
7.5 TABLET, FILM COATED ORAL NIGHTLY
Qty: 30 TABLET | Refills: 0
Start: 2017-03-09 | End: 2017-03-09

## 2017-03-09 RX ORDER — MIRTAZAPINE 7.5 MG/1
7.5 TABLET, FILM COATED ORAL NIGHTLY
Qty: 30 TABLET | Refills: 0 | Status: SHIPPED | OUTPATIENT
Start: 2017-03-09 | End: 2018-03-09

## 2017-03-09 RX ADMIN — AMLODIPINE BESYLATE 2.5 MG: 2.5 TABLET ORAL at 09:03

## 2017-03-09 RX ADMIN — GABAPENTIN 600 MG: 300 CAPSULE ORAL at 05:03

## 2017-03-09 RX ADMIN — VITAMIN D, TAB 1000IU (100/BT) 5000 UNITS: 25 TAB at 09:03

## 2017-03-09 RX ADMIN — METHADONE HYDROCHLORIDE 15 MG: 5 TABLET ORAL at 08:03

## 2017-03-09 RX ADMIN — FOLIC ACID 1 MG: 1 TABLET ORAL at 09:03

## 2017-03-09 RX ADMIN — POLYETHYLENE GLYCOL 3350 17 G: 17 POWDER, FOR SOLUTION ORAL at 09:03

## 2017-03-09 RX ADMIN — GABAPENTIN 600 MG: 300 CAPSULE ORAL at 02:03

## 2017-03-09 RX ADMIN — HYDROMORPHONE HYDROCHLORIDE 10 MG: 2 TABLET ORAL at 03:03

## 2017-03-09 RX ADMIN — ENOXAPARIN SODIUM 40 MG: 100 INJECTION SUBCUTANEOUS at 12:03

## 2017-03-09 RX ADMIN — HYDROMORPHONE HYDROCHLORIDE 10 MG: 2 TABLET ORAL at 09:03

## 2017-03-09 RX ADMIN — HYDROMORPHONE HYDROCHLORIDE 10 MG: 2 TABLET ORAL at 12:03

## 2017-03-09 RX ADMIN — STANDARDIZED SENNA CONCENTRATE AND DOCUSATE SODIUM 1 TABLET: 8.6; 5 TABLET, FILM COATED ORAL at 09:03

## 2017-03-09 NOTE — PROGRESS NOTES
Met with patient to discuss the recommendations for home health care along with bedside commode and rolling walker. Patient asked questions about the services and the equipment. All questions answered to her satisfaction. She asked if she could go home and call back if she felt she could benefit from the services and the equipment. She did not want to commit to it at this time. I told her to contact me after getting home if she felt she could benefit from the home health or the equipment. I provided her with contact information. I also left a Senior resource guide for her  who was inquiring previously about sitter services.

## 2017-03-09 NOTE — PROGRESS NOTES
Colon and Rectal Surgery    Pt seen and examined. Continues with some abdominal distention but improved. Multiple loose stool. Tolerating diet. Remains tachycardic. Afebrile.    KUB reviewed, no evidence of free air, stent in place  Abdomen distended but soft, minimally tender    recs:  Bowel regimen to keep stool loose  Please call CRS with any questions or concerns

## 2017-03-09 NOTE — PROGRESS NOTES
"Progress Note  Hematology/Oncology    Patient Name: Camilo Johnson  YOB: 1965    Admit Date: 2/24/2017                     LOS: 13    SUBJECTIVE:     Reason for Admission:  Uncontrolled pain    Interval history:   Overnight, afebrile. Remains tachycardic in the 120s up to 135 yesterday, asymptomatic and other VSS. STAT EKG showed sinus tachycardia. Gave 1 L NS bolus. HR today 119. Overnight resident called for pt being "unable to move left arm." Felt to be deltoid mm related, xray unlikely to be revealing given no pain or trauma (see significant note for details).   3/7  CRS did colonoscopy with stent placement. Stool passed through stent.     Current pain regiment:    -methadone 10mg PO TID    - methadone 5 mg BID   - gabapentin 600 TID   -hydromorphone 10mg PO q3hr PRN for breakthrough     Patient required (in last 24 hours)   -hydromorphone 10mg PO q3hr PRN for breakthrough x4 during the day yesterday and x 1 this AM.    - hydromorphone 2mg - one time dose in last 24 hours      Review of Systems  Constitutional: Positive for appetite change and fatigue. Negative for chills and fever.   HENT: Negative for sore throat and trouble swallowing.   Eyes: Negative for pain and visual disturbance.   Respiratory: Negative for cough and shortness of breath.   Cardiovascular: Negative for chest pain and palpitations.   Gastrointestinal: Positive for abdominal pain and nausea but has significantly improved. Negative for constipation and vomiting.   Genitourinary: Negative for difficulty urinating and dysuria.   Musculoskeletal: Negative for arthralgias and myalgias.   Skin: Negative for rash and wound.   Neurological: Negative for weakness, numbness and headaches.   OBJECTIVE:     Vital Signs Range (Last 24H):  Temp:  [98 °F (36.7 °C)-99.1 °F (37.3 °C)]   Pulse:  [111-121]   Resp:  [18-22]   BP: (110-131)/(68-88)   SpO2:  [93 %-99 %] Body mass index is 24.9 kg/(m^2).    I & O (Last " 24H):    Intake/Output Summary (Last 24 hours) at 03/09/17 0749  Last data filed at 03/09/17 0530   Gross per 24 hour   Intake             1234 ml   Output              150 ml   Net             1084 ml     Physical Exam (per last note)  Constitutional: She is oriented to person, place, and time. She appears well-developed and well-nourished. No distress.   HENT:   Head: Normocephalic and atraumatic.   Mouth/Throat: Oropharynx is clear and moist.   Eyes: Conjunctivae are normal. Pupils are equal, round, and reactive to light.   Cardiovascular: Normal rate, regular rhythm, normal heart sounds and intact distal pulses.   Pulmonary/Chest: Effort normal and breath sounds normal.   Abdominal: Soft. Bowel sounds are normal. She exhibits distension There is tenderness LLQ.   Musculoskeletal: She exhibits edema (1+BLE).   Neurological: She is alert and oriented to person, place, and time.   Skin: Skin is warm and dry. No rash noted.   Vitals reviewed.    Medications:  Scheduled:   amlodipine  2.5 mg Oral Daily    bisacodyl  10 mg Rectal Once    enoxaparin  40 mg Subcutaneous Daily    folic acid  1 mg Oral Daily    gabapentin  600 mg Oral TID    methadone  10 mg Oral Daily    methadone  15 mg Oral BID    polyethylene glycol  17 g Oral BID    senna-docusate 8.6-50 mg  1 tablet Oral BID    vitamin D  5,000 Units Oral Daily       Infusions:       PRN:  HYDROmorphone, lactulose, omnipaque, ondansetron, promethazine, ramelteon, simethicone    Diagnostic Results:  Lab Results   Component Value Date    WBC 16.61 (H) 03/08/2017    HGB 9.2 (L) 03/08/2017    HCT 27.1 (L) 03/08/2017    MCV 78 (L) 03/08/2017     03/08/2017     No results for input(s): GLU, NA, K, CL, CO2, BUN, CREATININE, CALCIUM, MG in the last 24 hours.  Lab Results   Component Value Date    INR 1.1 01/26/2017    INR 1.0 05/27/2016    INR 1.0 04/09/2016     Lab Results   Component Value Date    HGBA1C 5.8 03/18/2016     No results for input(s):  POCTGLUCOSE in the last 72 hours.      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     * Uncontrolled pain  - Patient with uncontrolled pain on regimen of morphine 12-hr 160mg PO q8hr, hydromorphone 4mg PO q4hr PRN (taking 6 times daily) at home.  - OME equivalent of 576mg in 24hr period as per home regimen. Required additional hydromorphone 2mg IV x 4 while in ED.   - With additional ED doses and dose adjustment, 24 hour oral morphine equivalent ~600. We divided this as 2/3 long-acting and 1/3 short-acting.   -will remain at her current pain regiment: methadone 10mg TID and 5 mg BID, hydromorphone 10mg PO q3hr PRN for breakthrough with hydromorphone 2mg IV q4hr PRN   -may consider increasing methadone dose every 2nd day  -palliative care on board  - CRS recs: colonoscopy w/stent placement 3/7, stool passed through stent  - 7 BMs x 24 hrs, more formed     Constipation  -had 3 documented BM.  -KUB completed   - 3/8 abdominal xray complete    Essential hypertension  - Continuing amlodipine 2.5mg PO daily.     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 Zaman in clinic.  - Suspect current pain secondary to malignancy.  - Active chemotherapy with nabpaclitaxel and gemcitabine (s/p day 2 cycle 8 on 02/17/17; was due for day 3 02/24/17).   - Will need to reschedule next cycle after pain better controlled.     Sickle cell trait  - No acute issues at this time. Lower suspicion for sickle cell trait as primary cause of worsening pain.      Cancer associated pain  - As  under uncontrolled pain.  - Continuing gabapentin 600mg PO TID as adjunct pain control.    Urinary Changes  -urine is dark, likely 2/2 to morphine use  -patient denies any pain, frequency or urgency  -UA is wnl, CMP indicates normal kidney function  -will continue to monitor    Dispo: pending clinical improvement and pain control, possible d/c today  DVT: Enoxaparin 40mg  Diet: Adult Regular      Riddhi Quintero  Internal Medicine, PGY1  Staff attestation to follow      ATTENDING NOTE, ONCOLOGY INPATIENT TEAM    As above; events of last 24 hours noted.  Patient seen and examined, chart reviewed.  Appears more comfortable, in NAD.  She states that she had multiple BMs and the stools are getting more formed.  Lungs are clear to auscultation.  Abdomen is soft, distended, however, not as disteneded as it wasd prior to the stenting procedure.  Labs reviewed.    PLAN  Discharge home on  Methadone 15-10-15  Gabapentin 600 mg TID  Dilaudid 8 mg po Q 3 hours prn.  Follow up with Dr. Zaman.  Her questions were answered to her satisfaction.      Shahid Spann MD

## 2017-03-09 NOTE — PLAN OF CARE
Problem: Patient Care Overview  Goal: Plan of Care Review  Outcome: Ongoing (interventions implemented as appropriate)  Pt remained afebrile during night. Pt did complain of pain to abdomen. which was slightly relieved with scheduled methadone and dilaudid po prn. Abdomen distended.  AAXO4. Safety precautions maintained. Pt remained free from injury during night. Bed is in low and locked position with side rails up x 2. Call bell is within reach of pt.

## 2017-03-09 NOTE — NURSING
Road Test:    Oxygen: Pt tolerated well on room air.  Ambulation:  Pt up ad latoya with stand-by assistance.  Devices:  Pt going home without any devices.  Tolerates (diet):  Pt tolerates a regular diet.  Elimination:  Pt voids without distress.  Self care:  Pt performs self care with minimal assistance/stand-by assistance.  Teaching:  Written and verbal discharge teaching given to patient and .      Prescriptions were given to pt and  able to fill prescriptions before being discharged from the hospital.  PIV removed before discharging pt.  Transport declined; pt being wheeled downstairs by .

## 2017-03-09 NOTE — DISCHARGE SUMMARY
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       Discharged Condition: Admit problems have stabilized     HOSPITAL COURSE:      Initial Presentation:    HPI: Ms. 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 Upon review of most recent clinic visit/patient emails with Dr. Zaman, patient should currently be taking morphine 12-hr 130mg PO q8hr with hydromorphone 4mg PO q6hr PRN for breakthrough pain. She states that she currently is taking morphine 12-hr 160mg PO TID with hydromorphone 4mg PO q4hr PRN.      Oncologic history:  Former patient of Dr. Fontenot followed with Dr. Zaman in clinic. Diagnosed with pancreatic cancer of the tail with biopsy and celiac neurolysis performed at [facility] 03/22/16. Pathology was consistent with poorly differentiated adenocarcinoma of the pancreas; imaging demonstrated pulmonary nodules and subcapsular liver lesion. She was initially started on modified FOLFIRINOX; tumors were slightly more prominent on repeat scans but did not fulfill criteria for disease progression. She was therefore started on nabpaclitaxel and gemcitabine (s/p day 2 cycle 8 on 02/17/17; was due for day 3 02/24/17).     Course of Principle Problem  for Admission:    Uncontrolled pain  - Patient with uncontrolled pain on regimen of morphine 12-hr 160mg PO q8hr, hydromorphone 4mg PO q4hr PRN (taking 6 times daily) at home.  - OME equivalent of 576mg in 24hr period as per home regimen. Required additional hydromorphone 2mg IV x 4 while in ED.   - With additional ED doses and dose adjustment, 24 hour oral morphine equivalent ~600. We divided this as 2/3 long-acting and 1/3 short-acting.   - current pain regiment: methadone 10mg TID and 5 mg BID, hydromorphone 10mg PO q3hr PRN for breakthrough with hydromorphone 2mg IV q4hr PRN   - 3/9 d/c'ed IV dilaudid prn  -palliative care on board  - CRS recs: colonoscopy w/stent placement 3/7, stool passed through stent  - 7 BMs x 24 hrs, more formed   - pain well controlled on current regimen     Medical Problems Addressed in the Hospital:    Constipation  -had multiple documented BM.  -KUB completed   - 3/8 abdominal xray complete     Essential hypertension  - Continuing amlodipine 2.5mg PO daily.      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 Zaman in clinic.  - Suspect current pain secondary to malignancy.  - Active chemotherapy with nabpaclitaxel and gemcitabine (s/p day 2 cycle 8 on 02/17/17; was due for day 3 02/24/17).   - reschedule next cycle after pain better controlled.      Sickle cell trait  - No acute issues at this time. Lower suspicion for sickle cell trait as primary cause of worsening pain.       Cancer associated pain  - As under uncontrolled pain.  - Continuing gabapentin 600mg PO TID as adjunct pain control.     Urinary Changes  -urine is dark, likely 2/2 to morphine use  -patient denies any pain, frequency or urgency  -UA is wnl, CMP indicates normal kidney function    CONSULTS: Interventional radiology, colon and rectal surgery, palliative care, nutrition    Last CBC/BMP/HgbA1c (if  applicable):  Recent Results (from the past 336 hour(s))   CBC auto differential    Collection Time: 03/08/17  4:43 AM   Result Value Ref Range    WBC 16.61 (H) 3.90 - 12.70 K/uL    Hemoglobin 9.2 (L) 12.0 - 16.0 g/dL    Hematocrit 27.1 (L) 37.0 - 48.5 %    Platelets 198 150 - 350 K/uL   CBC auto differential    Collection Time: 03/06/17  4:44 AM   Result Value Ref Range    WBC 13.07 (H) 3.90 - 12.70 K/uL    Hemoglobin 9.6 (L) 12.0 - 16.0 g/dL    Hematocrit 28.8 (L) 37.0 - 48.5 %    Platelets 168 150 - 350 K/uL   CBC W/ AUTO DIFFERENTIAL    Collection Time: 02/24/17  7:19 PM   Result Value Ref Range    WBC 6.07 3.90 - 12.70 K/uL    Hemoglobin 11.7 (L) 12.0 - 16.0 g/dL    Hematocrit 34.6 (L) 37.0 - 48.5 %    Platelets 208 150 - 350 K/uL     No results found for this or any previous visit (from the past 336 hour(s)).  Lab Results   Component Value Date    HGBA1C 5.8 03/18/2016       Special Treatments/Procedures: 3/7/2017 Colonoscopy w/stent placement by CRS    Disposition:  Home        Future Scheduled Appointments:  Future Appointments  Date Time Provider Department Center   3/14/2017 11:00 AM LAB, [place] [facility] [place]   3/15/2017 8:30 AM Sebas Zaman DO, FACP [facility]   3/17/2017 2:30 PM [facility], [facility]       Discharge Medication List:       Camilo Evans   Home Medication Instructions JOHN:[id number]    Printed on:03/09/17 8450   Medication Information                      amlodipine (NORVASC) 2.5 MG tablet  Take 1 tablet (2.5 mg total) by mouth once daily.             gabapentin (NEURONTIN) 300 MG capsule  Take 2 capsules (600 mg total) by mouth 3 (three) times daily.             GINGER ROOT (GINGER EXTRACT ORAL)  Take by mouth.             HYDROmorphone (DILAUDID) 2 MG tablet  Take 4 tablets (8 mg total) by mouth every 3 (three) hours as needed.             methadone (DOLOPHINE) 10 MG tablet  Take 1 tablet (10 mg total) by mouth once daily.            "  methadone (DOLOPHINE) 5 MG tablet  Take 3 tablets (15 mg total) by mouth 2 (two) times daily.             mirtazapine (REMERON) 7.5 MG Tab  Take 1 tablet (7.5 mg total) by mouth every evening.             multivitamin capsule  Take 1 capsule by mouth once daily.             ondansetron (ZOFRAN) 8 MG tablet  Take 1 tablet (8 mg total) by mouth every 8 (eight) hours as needed for Nausea.             polyethylene glycol (GLYCOLAX) 17 gram PwPk  Take 17 g by mouth 2 (two) times daily.             promethazine (PHENERGAN) 25 MG tablet  Take 1 tablet (25 mg total) by mouth every 6 (six) hours as needed for Nausea.             TURMERIC ROOT EXTRACT ORAL  Take by mouth.             vitamin D3-folic acid 5,000-1 unit-mg Tab  Take by mouth once daily.                 Patient Instructions:    Discharge Procedure Orders  COMMODE FOR HOME USE   Order Specific Question Answer Comments   Type: Standard    Height: 5' 4" (1.626 m)    Weight: 65.8 kg (145 lb 1 oz)    Does patient have medical equipment at home? none    Length of need (1-99 months): 12      WALKER FOR HOME USE   Order Specific Question Answer Comments   Type of Walker: Adult (5'4"-6'6")    With wheels? Yes    Height: 5' 4" (1.626 m)    Weight: 65.8 kg (145 lb 1 oz)    Length of need (1-99 months): 12    Does patient have medical equipment at home? none    Please check all that apply: Patient's condition impairs ambulation.    Please check all that apply: Patient is unable to safely ambulate without equipment.      Diet general     Activity as tolerated     Call MD for:  temperature >100.4     Call MD for:  persistent nausea and vomiting or diarrhea     Call MD for:  severe uncontrolled pain     Call MD for:  difficulty breathing or increased cough     Call MD for:  severe persistent headache     Call MD for:  persistent dizziness, light-headedness, or visual disturbances     Call MD for:  increased confusion or weakness         Signing Physician:  Riddhi Quintero " MD

## 2017-03-10 NOTE — PROGRESS NOTES
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  Pall med consult for pain management.    1.  Pain:  Nociceptive somatic due to underlying cancer + neuropathic  -Hold Methadone at 15-10-15  -Agree with decreasing Dilaudid to 8mg poq3 prn for breakthrough pain (patient is not using as much since decompression and stent placement)  -Encourage BM's with Methadone on-board    2.  Appetite:  Per patient and  report is not good  -Start Remeron 7.5mg poqHS which will help with sleep, appetite, and mood    3.  Goals of Care:  Long discussion held alongside Dr. Zaman.  Explained that further cancer directed therapies would depend on pt's performance status and ability to walk/eat and strong likelihood that we may not get to this point so importance of considering a Plan B should this happen be important.  Introduced home palliative NP which family is interested in as well as patient.  Her goal is as much time as possible and hopefully to receive further cancer directed therapies.  I have introduced resuscitation status and patient and  to think about this.  Additionally they will consider child life for their 7 year old son   - I have asked primary team to place a referral for home palliative with jeison/Vanesa Altamirano NP    Thank you for allowing me to be a part of Ms. Franz's care.  Please call with ?'s.  Recs given to Dr. Aaron of primary onc team and conveyed to Dr. Zaman.    Signature:  Abigail Chauhan MD, MS, FAAP  Internal Medicine, Pediatrics, Hospice & Palliative Medicine  Medical Director, Palliative Medicine  [phone]    Time spent:    75 minutes which consisted of pain management, goals of care, dispo planning  > 50% of  min visit spent in chart review, face to face  discussion of goals of care,  symptom assessment, coordination of care and emotional support.      SUBJECTIVE:   Interval History:  -Had C-scope done, feeling better, less pain  -Passing BM's  -Long conversation held with patient per above alongside Dr. Zaman  -No sob, decreased po intake, no cp, no n/v.   -Has trouble sleeping when in the hospital due to frequent disruptions    Past Medical History:   Diagnosis Date    Cancer associated pain 2017    Chemotherapy follow-up examination 2016    HTN (hypertension)     Pancreatic mass     Paronychia 2017    Sickle cell trait     SOB (shortness of breath) 2017    Swelling of lower extremity 2017     Past Surgical History:   Procedure Laterality Date    BREAST SURGERY      breast reduction     SECTION      COLONOSCOPY N/A 2017    Procedure: COLONOSCOPY;  Surgeon: Elvis Stein MD;  [facility] ENDO;  Service: Endoscopy;  Laterality: N/A;    COLONOSCOPY N/A 3/7/2017    Procedure: COLONOSCOPY with STENT;  Surgeon: Jan De Leon MD;  Location: [facility] ENDO ([address] [place] [address]);  Service: Endoscopy;  Laterality: N/A;    TUBAL LIGATION       Family History   Problem Relation Age of Onset    Diabetes Mother     Stroke Father     Hypertension Father     Heart disease Father     Hypertension Brother     Diabetes Brother     Hypertension Brother      Review of patient's allergies indicates:  No Known Allergies    Medications:  Continuous Infusions:   Scheduled:   PRN Meds:     24h Oral Morphine Equivalents (OME): Methadone 15-10-15 + Dilaudid 10mg po x 1 (=40 OME)    Bowel Management Plan (BMP): Yes ( x )  NO  (  )    OBJECTIVE:   Symptom Assessment (ESAS 0-10 scale)     ESAS 0 1 2 3 4 5 6 7 8 9 10   Pain      x        Dyspnea x             Anxiety x             Nausea x             Depression  x             Anorexia         x     Fatigue    x          Insomnia      x        Restlessness     x          Agitation    x           Constipation     _x_ --  ___+   Diarrhea           _x_ --  ___+     Comments:   Perfromance Status: PPS Score (40  )   Physical Exam:  Vitals: Temp: 99 °F (37.2 °C) (03/09/17 1106)  Pulse: (!) 115 (03/09/17 1106)  Resp: 19 (03/09/17 1106)  BP: 129/86 (03/09/17 1106)  SpO2: 96 % (03/09/17 1106)  Vitals: Temp: 98 °F (36.7 °C) (03/07/17 0410)  Pulse: (!) 123 (03/07/17 0410)  Resp: 16 (03/07/17 0410)  BP: 121/78 (03/07/17 0410)  SpO2: (!) 93 % (03/07/17 0410)  General:  no acute distress, appeared mostly comfortable, +cachexia  Pulmonary: Non labored,clear to auscultation anteriorly. No secretions, tachypneic at times  Cardiac: normal S1 & S2 w/o rubs/murmurs/gallops.   Abdominal: Distended, bowel sounds difficult to appreciate, not as much tenderness  Extremities: Moves all extremities x 4. No peripheral edema. 2+ pulses.  Skin: No jaundice, rashes, or visible lesions.  Neurological: Less sleepy today, no focal deficits.   Psych/Mental Status: less sleepy today, no agitation, flat affect  CAM / Delirium _x_ -- ___+   FAST Stage for Dementia:      Labs:  CBC:   WBC   Date Value Ref Range Status   03/08/2017 16.61 (H) 3.90 - 12.70 K/uL Final     Hemoglobin   Date Value Ref Range Status   03/08/2017 9.2 (L) 12.0 - 16.0 g/dL Final     Hematocrit   Date Value Ref Range Status   03/08/2017 27.1 (L) 37.0 - 48.5 % Final     MCV   Date Value Ref Range Status   03/08/2017 78 (L) 82 - 98 fL Final     Platelets   Date Value Ref Range Status   03/08/2017 198 150 - 350 K/uL Final       BMP: No results for input(s): GLU, NA, K, CL, CO2, BUN, CREATININE, CALCIUM, MG in the last 24 hours.    LFT:   Lab Results   Component Value Date    AST 21 03/08/2017    ALKPHOS 116 03/08/2017    BILITOT 1.2 (H) 03/08/2017       Albumin:   Albumin   Date Value Ref Range Status   03/08/2017 2.3 (L) 3.5 - 5.2 g/dL Final     Protein:   Total Protein   Date Value Ref Range Status   03/08/2017 6.5 6.0 - 8.4 g/dL Final       LACTIC ACID:   Lab Results    Component Value Date    LACTATE 1.1 05/05/2016       Radiology:  KUB (3/8/17):  Findings    The examination is compared with the study performed on 3/5/17.    Since the previous examination there is no presence of a bowel stent in the left upper quadrant.    Dilated small bowel loops with air-fluid levels noted.  Findings are suggestive of distal small bowel obstruction or ileus.  There is no free air under the diaphragm to suggest perforation.    Legal/Advanced Directives:  Living Will: No  Resuscitate Status: Full  Decision-Making Capacity: Yes  Medical Power of :     Psychosocial/Cultural: See consult from 3/6, youngest out of 7 kids, only girl.  Has 2 dtrs in college at [employer].  Has 7 yr old son at [employer].  Worked previously    Spiritual: See consult note from 3/6    F- Kiersten and Belief    I - Importance  .  C - Community    A - Address in Care      Problem list:  Active Hospital Problems    Diagnosis  POA    *Uncontrolled pain [R52]  Yes    Cancer associated pain [G89.3]  Yes     Chronic    Sickle cell trait [D57.3]  Yes     Chronic    Malignant neoplasm of tail of pancreas [C25.2]  Yes    Essential hypertension [I10]  Yes     Chronic      Resolved Hospital Problems    Diagnosis Date Resolved POA   No resolved problems to display.

## 2017-03-13 RX ORDER — HYDROMORPHONE HYDROCHLORIDE 8 MG/1
8 TABLET ORAL
Qty: 180 TABLET | Refills: 0 | Status: SHIPPED | OUTPATIENT
Start: 2017-03-13

## 2017-03-13 NOTE — TELEPHONE ENCOUNTER
Palliative Care Progress Note - 3/13/17:    Called by patient's , Mr. Jef Johnson (ph# [phone]).    Patient was discharged with 2mg Dilaudid tabs and has been taking 5 of them (for total of 10mg) every 3 hours prn.  She is going to run out of them today.    I checked discharge paperwork and 8mg q3prn was recommended, however, indeed the 2mg tabs were ordered vs the 8mg tabs.    1.  I am refilling patient's dilaudid with 8mg tabs and these are to be taken q3 prn.  I explained why the dose was decreased to the .    2.  I have increased patient's Methadone to 15TID as she will easily tolerate this dose with as much Dilaudid as she is taking.    3.   inquired about a hospital bed.  I have discussed this with Perla Meek in Onc SW and provided Mr. Johnson her direct number to further discuss issues of copay.    Thank you for allowing me to participate in ms. Franz's care.    Abigail Chauhan MD, MS, FAAP  Internal Medicine, Pediatrics, Hospice & Palliative Medicine  Medical Director, Palliative Medicine  [phone]

## 2017-03-14 ENCOUNTER — LAB VISIT (OUTPATIENT)
Dept: LAB | Facility: HOSPITAL | Age: 52
End: 2017-03-14
Attending: INTERNAL MEDICINE
Payer: COMMERCIAL

## 2017-03-14 DIAGNOSIS — G89.3 CANCER ASSOCIATED PAIN: ICD-10-CM

## 2017-03-14 DIAGNOSIS — C25.9 PANCREATIC CANCER METASTASIZED TO LIVER: Primary | ICD-10-CM

## 2017-03-14 DIAGNOSIS — C78.7 PANCREATIC CANCER METASTASIZED TO LIVER: Primary | ICD-10-CM

## 2017-03-14 LAB
ALBUMIN SERPL BCP-MCNC: 2.4 G/DL
ALP SERPL-CCNC: 101 U/L
ALT SERPL W/O P-5'-P-CCNC: 13 U/L
ANION GAP SERPL CALC-SCNC: 13 MMOL/L
AST SERPL-CCNC: 30 U/L
BILIRUB SERPL-MCNC: 1.4 MG/DL
BUN SERPL-MCNC: 15 MG/DL
CALCIUM SERPL-MCNC: 9.3 MG/DL
CHLORIDE SERPL-SCNC: 98 MMOL/L
CO2 SERPL-SCNC: 26 MMOL/L
CREAT SERPL-MCNC: 0.8 MG/DL
ERYTHROCYTE [DISTWIDTH] IN BLOOD BY AUTOMATED COUNT: 17.9 %
EST. GFR  (AFRICAN AMERICAN): >60 ML/MIN/1.73 M^2
EST. GFR  (NON AFRICAN AMERICAN): >60 ML/MIN/1.73 M^2
GLUCOSE SERPL-MCNC: 96 MG/DL
HCT VFR BLD AUTO: 31.2 %
HGB BLD-MCNC: 10.4 G/DL
MCH RBC QN AUTO: 26.5 PG
MCHC RBC AUTO-ENTMCNC: 33.3 %
MCV RBC AUTO: 80 FL
NEUTROPHILS # BLD AUTO: 19.6 K/UL
PLATELET # BLD AUTO: 193 K/UL
PMV BLD AUTO: 11 FL
POTASSIUM SERPL-SCNC: 4.9 MMOL/L
PROT SERPL-MCNC: 7.3 G/DL
RBC # BLD AUTO: 3.92 M/UL
SODIUM SERPL-SCNC: 137 MMOL/L
WBC # BLD AUTO: 22.45 K/UL

## 2017-03-14 PROCEDURE — 85027 COMPLETE CBC AUTOMATED: CPT

## 2017-03-14 PROCEDURE — 36415 COLL VENOUS BLD VENIPUNCTURE: CPT | Mod: PO

## 2017-03-14 PROCEDURE — 80053 COMPREHEN METABOLIC PANEL: CPT

## 2017-03-15 ENCOUNTER — OFFICE VISIT (OUTPATIENT)
Dept: HEMATOLOGY/ONCOLOGY | Facility: CLINIC | Age: 52
End: 2017-03-15
Payer: COMMERCIAL

## 2017-03-15 ENCOUNTER — TELEPHONE (OUTPATIENT)
Dept: FAMILY MEDICINE | Facility: CLINIC | Age: 52
End: 2017-03-15

## 2017-03-15 VITALS
HEART RATE: 132 BPM | SYSTOLIC BLOOD PRESSURE: 117 MMHG | TEMPERATURE: 98 F | HEIGHT: 64 IN | BODY MASS INDEX: 24.32 KG/M2 | DIASTOLIC BLOOD PRESSURE: 70 MMHG | WEIGHT: 142.44 LBS

## 2017-03-15 DIAGNOSIS — C78.7 PANCREATIC CANCER METASTASIZED TO LIVER: ICD-10-CM

## 2017-03-15 DIAGNOSIS — C25.9 PANCREATIC CANCER METASTASIZED TO LIVER: ICD-10-CM

## 2017-03-15 DIAGNOSIS — C25.2 MALIGNANT NEOPLASM OF TAIL OF PANCREAS: Primary | ICD-10-CM

## 2017-03-15 PROCEDURE — 99999 PR PBB SHADOW E&M-EST. PATIENT-LVL III: CPT | Mod: PBBFAC,,, | Performed by: INTERNAL MEDICINE

## 2017-03-15 PROCEDURE — 3074F SYST BP LT 130 MM HG: CPT | Mod: S$GLB,,, | Performed by: INTERNAL MEDICINE

## 2017-03-15 PROCEDURE — 99215 OFFICE O/P EST HI 40 MIN: CPT | Mod: S$GLB,,, | Performed by: INTERNAL MEDICINE

## 2017-03-15 PROCEDURE — 1160F RVW MEDS BY RX/DR IN RCRD: CPT | Mod: S$GLB,,, | Performed by: INTERNAL MEDICINE

## 2017-03-15 PROCEDURE — 3078F DIAST BP <80 MM HG: CPT | Mod: S$GLB,,, | Performed by: INTERNAL MEDICINE

## 2017-03-15 NOTE — TELEPHONE ENCOUNTER
Mrs Bagley from [facility] called stating that patient is ready to be admitted at hospice.  Need a new referral update.  Please advise.

## 2017-03-15 NOTE — PROGRESS NOTES
PATIENT: Camilo Johnson  MRN: [id number]  DATE: 3/15/2017      Diagnosis:   1. Malignant neoplasm of tail of pancreas    2. 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 Osei Johnson is a 52 y.o. female who returns for follow up for pancreatic cancer.  She was recently hospitalized due to pain control issues.  She was transitioned to methadone and seemed to have some improvement.  Since her discharge her pain has worsened and is using breakthrough meds every 3 hours with varying success.  She continues to lose weight and has had a poor appetite.  She is in the bed or a chair the majority of her days.  She has not left the house since her discharge.  Overall continues to decline.    Past Medical History:   Past Medical History:   Diagnosis Date    Cancer associated pain 2017    Chemotherapy follow-up examination 2016    HTN (hypertension)     Pancreatic mass     Paronychia 2017    Sickle cell trait     SOB (shortness of breath) 2017    Swelling of lower extremity 2017       Past Surgical HIstory:   Past Surgical History:   Procedure Laterality Date    BREAST SURGERY      breast reduction     SECTION      COLONOSCOPY N/A 2017    Procedure: COLONOSCOPY;  Surgeon: Elvis Stein MD;  Location: [facility];  Service: Endoscopy;  Laterality: N/A;    COLONOSCOPY N/A 3/7/2017    Procedure: COLONOSCOPY with STENT;  Surgeon: Jan De Leon MD;  Location: [facility] ([address] [place] [address]);  Service: Endoscopy;  Laterality: N/A;    TUBAL LIGATION         Family History:   Family History   Problem Relation Age of Onset    Diabetes Mother      Stroke Father     Hypertension Father     Heart disease Father     Hypertension Brother     Diabetes Brother     Hypertension Brother        Social History:  reports that she has never smoked. She has never used smokeless tobacco. She reports that she drinks alcohol. She reports that she does not use illicit drugs.    Allergies:  Review of patient's allergies indicates:  No Known Allergies    Medications:  Current Outpatient Prescriptions   Medication Sig Dispense Refill    amlodipine (NORVASC) 2.5 MG tablet Take 1 tablet (2.5 mg total) by mouth once daily. 90 tablet 1    gabapentin (NEURONTIN) 300 MG capsule Take 2 capsules (600 mg total) by mouth 3 (three) times daily. 90 capsule 0    GINGER ROOT (GINGER EXTRACT ORAL) Take by mouth.      HYDROmorphone (DILAUDID) 8 MG tablet Take 1 tablet (8 mg total) by mouth every 3 (three) hours as needed for Pain. 180 tablet 0    methadone (DOLOPHINE) 10 MG tablet Take 1 tablet (10 mg total) by mouth once daily. 14 tablet 0    methadone (DOLOPHINE) 5 MG tablet Take 3 tablets (15 mg total) by mouth 2 (two) times daily. 90 tablet 0    mirtazapine (REMERON) 7.5 MG Tab Take 1 tablet (7.5 mg total) by mouth every evening. 30 tablet 0    multivitamin capsule Take 1 capsule by mouth once daily.      ondansetron (ZOFRAN) 8 MG tablet Take 1 tablet (8 mg total) by mouth every 8 (eight) hours as needed for Nausea. 60 tablet 2    polyethylene glycol (GLYCOLAX) 17 gram PwPk Take 17 g by mouth 2 (two) times daily. 100 each 3    promethazine (PHENERGAN) 25 MG tablet Take 1 tablet (25 mg total) by mouth every 6 (six) hours as needed for Nausea. 30 tablet 1    TURMERIC ROOT EXTRACT ORAL Take by mouth.      vitamin D3-folic acid 5,000-1 unit-mg Tab Take by mouth once daily.       No current facility-administered medications for this visit.      Facility-Administered Medications Ordered in Other Visits   Medication Dose Route Frequency Provider Last Rate Last Dose    heparin,  "porcine (PF) 100 unit/mL injection flush 500 Units  500 Units Intravenous PRN Sebas Zaman, DO, FACP        sodium chloride 0.9% flush 10 mL  10 mL Intravenous PRN Sebas Zaman DO, FACP           Review of Systems   Constitutional: Positive for activity change, appetite change, fatigue and unexpected weight change. Negative for chills and fever.   HENT: Negative for congestion, hearing loss and nosebleeds.    Eyes: Negative for visual disturbance.   Respiratory: Positive for shortness of breath. Negative for cough.    Cardiovascular: Positive for leg swelling. Negative for chest pain and palpitations.   Gastrointestinal: Positive for abdominal pain. Negative for abdominal distention, blood in stool, constipation, diarrhea, nausea and vomiting.   Genitourinary: Negative for dysuria.   Musculoskeletal: Negative for back pain and gait problem.   Skin: Positive for wound. Negative for color change and rash.   Neurological: Positive for numbness. Negative for dizziness, weakness and headaches.   Hematological: Negative for adenopathy. Does not bruise/bleed easily.   Psychiatric/Behavioral: Negative for confusion.       ECOG Performance Status:   ECOG SCORE    3 - Capable of only limited selfcare, confined to bed or chair more than 50% of waking hours          Objective:      Vitals:   Vitals:    03/15/17 0923   BP: 117/70   Pulse: (!) 132   Temp: 98.2 °F (36.8 °C)   Weight: 64.6 kg (142 lb 6.7 oz)   Height: 5' 4" (1.626 m)     BMI: Body mass index is 24.45 kg/(m^2).    Physical Exam   Constitutional: She is oriented to person, place, and time. She appears cachectic. She appears ill. No distress.   In a wheelchair   HENT:   Head: Normocephalic.   Mouth/Throat: No oropharyngeal exudate.   Eyes: EOM are normal. No scleral icterus.   Neck: Neck supple. No tracheal deviation present. No thyromegaly present.   Cardiovascular: Normal rate and regular rhythm.    Pulmonary/Chest: Effort normal and breath sounds " normal. No respiratory distress. She has no wheezes. She has no rales.   Abdominal: Soft. She exhibits distension. She exhibits no mass. There is tenderness. There is no rebound and no guarding.   Musculoskeletal: Normal range of motion. She exhibits edema.   Bilateral lower extremity edema right greater than left   Lymphadenopathy:     She has no cervical adenopathy.   Neurological: She is alert and oriented to person, place, and time. No cranial nerve deficit.   Skin: Skin is warm and dry.   Psychiatric: She has a normal mood and affect.       Laboratory Data:  Lab Visit on 03/14/2017   Component Date Value Ref Range Status    WBC 03/14/2017 22.45* 3.90 - 12.70 K/uL Final    RBC 03/14/2017 3.92* 4.00 - 5.40 M/uL Final    Hemoglobin 03/14/2017 10.4* 12.0 - 16.0 g/dL Final    Hematocrit 03/14/2017 31.2* 37.0 - 48.5 % Final    MCV 03/14/2017 80* 82 - 98 fL Final    MCH 03/14/2017 26.5* 27.0 - 31.0 pg Final    MCHC 03/14/2017 33.3  32.0 - 36.0 % Final    RDW 03/14/2017 17.9* 11.5 - 14.5 % Final    Platelets 03/14/2017 193  150 - 350 K/uL Final    MPV 03/14/2017 11.0  9.2 - 12.9 fL Final    Gran # 03/14/2017 19.6* 1.8 - 7.7 K/uL Final    Comment: The ANC is based on a white cell differential from an   automated cell counter. It has not been microscopically   reviewed for the presence of abnormal cells. Clinical   correlation is required.      Sodium 03/14/2017 137  136 - 145 mmol/L Final    Potassium 03/14/2017 4.9  3.5 - 5.1 mmol/L Final    Chloride 03/14/2017 98  95 - 110 mmol/L Final    CO2 03/14/2017 26  23 - 29 mmol/L Final    Glucose 03/14/2017 96  70 - 110 mg/dL Final    BUN, Bld 03/14/2017 15  6 - 20 mg/dL Final    Creatinine 03/14/2017 0.8  0.5 - 1.4 mg/dL Final    Calcium 03/14/2017 9.3  8.7 - 10.5 mg/dL Final    Total Protein 03/14/2017 7.3  6.0 - 8.4 g/dL Final    Albumin 03/14/2017 2.4* 3.5 - 5.2 g/dL Final    Total Bilirubin 03/14/2017 1.4* 0.1 - 1.0 mg/dL Final    Comment: For  infants and newborns, interpretation of results should be based  on gestational age, weight and in agreement with clinical  observations.  Premature Infant recommended reference ranges:  Up to 24 hours.............<8.0 mg/dL  Up to 48 hours............<12.0 mg/dL  3-5 days..................<15.0 mg/dL  6-29 days.................<15.0 mg/dL      Alkaline Phosphatase 03/14/2017 101  55 - 135 U/L Final    AST 03/14/2017 30  10 - 40 U/L Final    ALT 03/14/2017 13  10 - 44 U/L Final    Anion Gap 03/14/2017 13  8 - 16 mmol/L Final    eGFR if African American 03/14/2017 >60.0  >60 mL/min/1.73 m^2 Final    eGFR if non African American 03/14/2017 >60.0  >60 mL/min/1.73 m^2 Final    Comment: Calculation used to obtain the estimated glomerular filtration  rate (eGFR) is the CKD-EPI equation. Since race is unknown   in our information system, the eGFR values for   -American and Non--American patients are given   for each creatinine result.     Admission on 02/24/2017, Discharged on 03/09/2017   Component Date Value Ref Range Status    WBC 02/24/2017 6.07  3.90 - 12.70 K/uL Final    RBC 02/24/2017 4.26  4.00 - 5.40 M/uL Final    Hemoglobin 02/24/2017 11.7* 12.0 - 16.0 g/dL Final    Hematocrit 02/24/2017 34.6* 37.0 - 48.5 % Final    MCV 02/24/2017 81* 82 - 98 fL Final    MCH 02/24/2017 27.5  27.0 - 31.0 pg Final    MCHC 02/24/2017 33.8  32.0 - 36.0 % Final    RDW 02/24/2017 16.5* 11.5 - 14.5 % Final    Platelets 02/24/2017 208  150 - 350 K/uL Final    MPV 02/24/2017 9.4  9.2 - 12.9 fL Final    Gran # 02/24/2017 3.9  1.8 - 7.7 K/uL Final    Lymph # 02/24/2017 1.5  1.0 - 4.8 K/uL Final    Mono # 02/24/2017 0.5  0.3 - 1.0 K/uL Final    Eos # 02/24/2017 0.1  0.0 - 0.5 K/uL Final    Baso # 02/24/2017 0.02  0.00 - 0.20 K/uL Final    Gran% 02/24/2017 63.4  38.0 - 73.0 % Final    Lymph% 02/24/2017 25.2  18.0 - 48.0 % Final    Mono% 02/24/2017 7.9  4.0 - 15.0 % Final    Eosinophil% 02/24/2017 2.0   0.0 - 8.0 % Final    Basophil% 02/24/2017 0.3  0.0 - 1.9 % Final    Differential Method 02/24/2017 Automated   Final    Sodium 02/24/2017 135* 136 - 145 mmol/L Final    Potassium 02/24/2017 4.0  3.5 - 5.1 mmol/L Final    Chloride 02/24/2017 100  95 - 110 mmol/L Final    CO2 02/24/2017 23  23 - 29 mmol/L Final    Glucose 02/24/2017 94  70 - 110 mg/dL Final    BUN, Bld 02/24/2017 6  6 - 20 mg/dL Final    Creatinine 02/24/2017 0.5  0.5 - 1.4 mg/dL Final    Calcium 02/24/2017 9.4  8.7 - 10.5 mg/dL Final    Total Protein 02/24/2017 7.2  6.0 - 8.4 g/dL Final    Albumin 02/24/2017 2.9* 3.5 - 5.2 g/dL Final    Total Bilirubin 02/24/2017 0.7  0.1 - 1.0 mg/dL Final    Comment: For infants and newborns, interpretation of results should be based  on gestational age, weight and in agreement with clinical  observations.  Premature Infant recommended reference ranges:  Up to 24 hours.............<8.0 mg/dL  Up to 48 hours............<12.0 mg/dL  3-5 days..................<15.0 mg/dL  6-29 days.................<15.0 mg/dL      Alkaline Phosphatase 02/24/2017 83  55 - 135 U/L Final    AST 02/24/2017 22  10 - 40 U/L Final    ALT 02/24/2017 13  10 - 44 U/L Final    Anion Gap 02/24/2017 12  8 - 16 mmol/L Final    eGFR if  02/24/2017 >60.0  >60 mL/min/1.73 m^2 Final    eGFR if non African American 02/24/2017 >60.0  >60 mL/min/1.73 m^2 Final    Comment: Calculation used to obtain the estimated glomerular filtration  rate (eGFR) is the CKD-EPI equation. Since race is unknown   in our information system, the eGFR values for   -American and Non--American patients are given   for each creatinine result.      Specimen UA 02/25/2017 Urine, Clean Catch   Final    Color, UA 02/25/2017 Yellow  Yellow, Straw, María Final    Appearance, UA 02/25/2017 Clear  Clear Final    pH, UA 02/25/2017 6.0  5.0 - 8.0 Final    Specific Gravity, UA 02/25/2017 1.010  1.005 - 1.030 Final    Protein, UA  02/25/2017 Negative  Negative Final    Comment: Recommend a 24 hour urine protein or a urine   protein/creatinine ratio if globulin induced proteinuria is  clinically suspected.      Glucose, UA 02/25/2017 Negative  Negative Final    Ketones, UA 02/25/2017 1+* Negative Final    Bilirubin (UA) 02/25/2017 Negative  Negative Final    Occult Blood UA 02/25/2017 Negative  Negative Final    Nitrite, UA 02/25/2017 Negative  Negative Final    Urobilinogen, UA 02/25/2017 Negative  <2.0 EU/dL Final    Leukocytes, UA 02/25/2017 Negative  Negative Final    Sodium 02/26/2017 135* 136 - 145 mmol/L Final    Potassium 02/26/2017 4.5  3.5 - 5.1 mmol/L Final    Chloride 02/26/2017 98  95 - 110 mmol/L Final    CO2 02/26/2017 30* 23 - 29 mmol/L Final    Glucose 02/26/2017 102  70 - 110 mg/dL Final    BUN, Bld 02/26/2017 6  6 - 20 mg/dL Final    Creatinine 02/26/2017 0.6  0.5 - 1.4 mg/dL Final    Calcium 02/26/2017 9.3  8.7 - 10.5 mg/dL Final    Total Protein 02/26/2017 6.8  6.0 - 8.4 g/dL Final    Albumin 02/26/2017 2.8* 3.5 - 5.2 g/dL Final    Total Bilirubin 02/26/2017 0.7  0.1 - 1.0 mg/dL Final    Comment: For infants and newborns, interpretation of results should be based  on gestational age, weight and in agreement with clinical  observations.  Premature Infant recommended reference ranges:  Up to 24 hours.............<8.0 mg/dL  Up to 48 hours............<12.0 mg/dL  3-5 days..................<15.0 mg/dL  6-29 days.................<15.0 mg/dL      Alkaline Phosphatase 02/26/2017 83  55 - 135 U/L Final    AST 02/26/2017 20  10 - 40 U/L Final    ALT 02/26/2017 13  10 - 44 U/L Final    Anion Gap 02/26/2017 7* 8 - 16 mmol/L Final    eGFR if African American 02/26/2017 >60.0  >60 mL/min/1.73 m^2 Final    eGFR if non African American 02/26/2017 >60.0  >60 mL/min/1.73 m^2 Final    Comment: Calculation used to obtain the estimated glomerular filtration  rate (eGFR) is the CKD-EPI equation. Since race is unknown    in our information system, the eGFR values for   -American and Non--American patients are given   for each creatinine result.      Hepatitis B Surface Ag 02/26/2017 Negative   Final    Hep B C IgM 02/26/2017 Negative   Final    Hep A IgM 02/26/2017 Negative   Final    Hepatitis C Ab 02/26/2017 Negative   Final    Sodium 03/01/2017 131* 136 - 145 mmol/L Final    Potassium 03/01/2017 4.3  3.5 - 5.1 mmol/L Final    Chloride 03/01/2017 94* 95 - 110 mmol/L Final    CO2 03/01/2017 29  23 - 29 mmol/L Final    Glucose 03/01/2017 114* 70 - 110 mg/dL Final    BUN, Bld 03/01/2017 7  6 - 20 mg/dL Final    Creatinine 03/01/2017 0.7  0.5 - 1.4 mg/dL Final    Calcium 03/01/2017 9.5  8.7 - 10.5 mg/dL Final    Total Protein 03/01/2017 7.2  6.0 - 8.4 g/dL Final    Albumin 03/01/2017 3.0* 3.5 - 5.2 g/dL Final    Total Bilirubin 03/01/2017 1.2* 0.1 - 1.0 mg/dL Final    Comment: For infants and newborns, interpretation of results should be based  on gestational age, weight and in agreement with clinical  observations.  Premature Infant recommended reference ranges:  Up to 24 hours.............<8.0 mg/dL  Up to 48 hours............<12.0 mg/dL  3-5 days..................<15.0 mg/dL  6-29 days.................<15.0 mg/dL      Alkaline Phosphatase 03/01/2017 96  55 - 135 U/L Final    AST 03/01/2017 20  10 - 40 U/L Final    ALT 03/01/2017 14  10 - 44 U/L Final    Anion Gap 03/01/2017 8  8 - 16 mmol/L Final    eGFR if African American 03/01/2017 >60.0  >60 mL/min/1.73 m^2 Final    eGFR if non African American 03/01/2017 >60.0  >60 mL/min/1.73 m^2 Final    Comment: Calculation used to obtain the estimated glomerular filtration  rate (eGFR) is the CKD-EPI equation. Since race is unknown   in our information system, the eGFR values for   -American and Non--American patients are given   for each creatinine result.      Specimen UA 03/01/2017 Urine, Clean Catch   Final    Color, UA 03/01/2017  Yellow  Yellow, Straw, María Final    Appearance, UA 03/01/2017 Clear  Clear Final    pH, UA 03/01/2017 6.0  5.0 - 8.0 Final    Specific Gravity, UA 03/01/2017 1.015  1.005 - 1.030 Final    Protein, UA 03/01/2017 Trace* Negative Final    Comment: Recommend a 24 hour urine protein or a urine   protein/creatinine ratio if globulin induced proteinuria is  clinically suspected.      Glucose, UA 03/01/2017 Negative  Negative Final    Ketones, UA 03/01/2017 Negative  Negative Final    Bilirubin (UA) 03/01/2017 Negative  Negative Final    Occult Blood UA 03/01/2017 Negative  Negative Final    Nitrite, UA 03/01/2017 Negative  Negative Final    Urobilinogen, UA 03/01/2017 Negative  <2.0 EU/dL Final    Leukocytes, UA 03/01/2017 Negative  Negative Final    WBC 03/06/2017 13.07* 3.90 - 12.70 K/uL Final    RBC 03/06/2017 3.60* 4.00 - 5.40 M/uL Final    Hemoglobin 03/06/2017 9.6* 12.0 - 16.0 g/dL Final    Hematocrit 03/06/2017 28.8* 37.0 - 48.5 % Final    MCV 03/06/2017 80* 82 - 98 fL Final    MCH 03/06/2017 26.7* 27.0 - 31.0 pg Final    MCHC 03/06/2017 33.3  32.0 - 36.0 % Final    RDW 03/06/2017 16.9* 11.5 - 14.5 % Final    Platelets 03/06/2017 168  150 - 350 K/uL Final    MPV 03/06/2017 9.7  9.2 - 12.9 fL Final    Gran # 03/06/2017 9.1* 1.8 - 7.7 K/uL Final    Lymph # 03/06/2017 2.5  1.0 - 4.8 K/uL Final    Mono # 03/06/2017 1.3* 0.3 - 1.0 K/uL Final    Eos # 03/06/2017 0.0  0.0 - 0.5 K/uL Final    Baso # 03/06/2017 0.02  0.00 - 0.20 K/uL Final    Gran% 03/06/2017 70.8  38.0 - 73.0 % Final    Lymph% 03/06/2017 18.7  18.0 - 48.0 % Final    Mono% 03/06/2017 10.1  4.0 - 15.0 % Final    Eosinophil% 03/06/2017 0.2  0.0 - 8.0 % Final    Basophil% 03/06/2017 0.2  0.0 - 1.9 % Final    Platelet Estimate 03/06/2017 Appears normal   Final    Aniso 03/06/2017 Slight   Final    Poik 03/06/2017 Slight   Final    Poly 03/06/2017 Occasional   Final    Hypo 03/06/2017 Occasional   Final    [place] Cells  03/06/2017 Occasional   Final    Differential Method 03/06/2017 Automated   Final    Sodium 03/06/2017 132* 136 - 145 mmol/L Final    Potassium 03/06/2017 4.2  3.5 - 5.1 mmol/L Final    Chloride 03/06/2017 93* 95 - 110 mmol/L Final    CO2 03/06/2017 26  23 - 29 mmol/L Final    Glucose 03/06/2017 91  70 - 110 mg/dL Final    BUN, Bld 03/06/2017 11  6 - 20 mg/dL Final    Creatinine 03/06/2017 0.7  0.5 - 1.4 mg/dL Final    Calcium 03/06/2017 9.6  8.7 - 10.5 mg/dL Final    Total Protein 03/06/2017 7.3  6.0 - 8.4 g/dL Final    Albumin 03/06/2017 2.8* 3.5 - 5.2 g/dL Final    Total Bilirubin 03/06/2017 1.2* 0.1 - 1.0 mg/dL Final    Comment: For infants and newborns, interpretation of results should be based  on gestational age, weight and in agreement with clinical  observations.  Premature Infant recommended reference ranges:  Up to 24 hours.............<8.0 mg/dL  Up to 48 hours............<12.0 mg/dL  3-5 days..................<15.0 mg/dL  6-29 days.................<15.0 mg/dL      Alkaline Phosphatase 03/06/2017 124  55 - 135 U/L Final    AST 03/06/2017 20  10 - 40 U/L Final    ALT 03/06/2017 13  10 - 44 U/L Final    Anion Gap 03/06/2017 13  8 - 16 mmol/L Final    eGFR if African American 03/06/2017 >60.0  >60 mL/min/1.73 m^2 Final    eGFR if non African American 03/06/2017 >60.0  >60 mL/min/1.73 m^2 Final    Comment: Calculation used to obtain the estimated glomerular filtration  rate (eGFR) is the CKD-EPI equation. Since race is unknown   in our information system, the eGFR values for   -American and Non--American patients are given   for each creatinine result.      WBC 03/08/2017 16.61* 3.90 - 12.70 K/uL Final    RBC 03/08/2017 3.46* 4.00 - 5.40 M/uL Final    Hemoglobin 03/08/2017 9.2* 12.0 - 16.0 g/dL Final    Hematocrit 03/08/2017 27.1* 37.0 - 48.5 % Final    MCV 03/08/2017 78* 82 - 98 fL Final    MCH 03/08/2017 26.6* 27.0 - 31.0 pg Final    MCHC 03/08/2017 33.9  32.0 - 36.0  % Final    RDW 03/08/2017 17.1* 11.5 - 14.5 % Final    Platelets 03/08/2017 198  150 - 350 K/uL Final    MPV 03/08/2017 9.7  9.2 - 12.9 fL Final    Gran% 03/08/2017 94.0* 38.0 - 73.0 % Final    Lymph% 03/08/2017 2.0* 18.0 - 48.0 % Final    Mono% 03/08/2017 3.0* 4.0 - 15.0 % Final    Eosinophil% 03/08/2017 0.0  0.0 - 8.0 % Final    Basophil% 03/08/2017 0.0  0.0 - 1.9 % Final    Bands 03/08/2017 1.0  % Final    Platelet Estimate 03/08/2017 Appears normal   Final    Aniso 03/08/2017 Slight   Final    Poik 03/08/2017 Slight   Final    Poly 03/08/2017 Occasional   Final    Hypo 03/08/2017 Occasional   Final    Ovalocytes 03/08/2017 Occasional   Final    Target Cells 03/08/2017 Occasional   Final    Differential Method 03/08/2017 Manual   Final    Sodium 03/08/2017 137  136 - 145 mmol/L Final    Potassium 03/08/2017 3.7  3.5 - 5.1 mmol/L Final    Chloride 03/08/2017 100  95 - 110 mmol/L Final    CO2 03/08/2017 24  23 - 29 mmol/L Final    Glucose 03/08/2017 55* 70 - 110 mg/dL Final    BUN, Bld 03/08/2017 8  6 - 20 mg/dL Final    Creatinine 03/08/2017 0.6  0.5 - 1.4 mg/dL Final    Calcium 03/08/2017 8.8  8.7 - 10.5 mg/dL Final    Total Protein 03/08/2017 6.5  6.0 - 8.4 g/dL Final    Albumin 03/08/2017 2.3* 3.5 - 5.2 g/dL Final    Total Bilirubin 03/08/2017 1.2* 0.1 - 1.0 mg/dL Final    Comment: For infants and newborns, interpretation of results should be based  on gestational age, weight and in agreement with clinical  observations.  Premature Infant recommended reference ranges:  Up to 24 hours.............<8.0 mg/dL  Up to 48 hours............<12.0 mg/dL  3-5 days..................<15.0 mg/dL  6-29 days.................<15.0 mg/dL      Alkaline Phosphatase 03/08/2017 116  55 - 135 U/L Final    AST 03/08/2017 21  10 - 40 U/L Final    ALT 03/08/2017 11  10 - 44 U/L Final    Anion Gap 03/08/2017 13  8 - 16 mmol/L Final    eGFR if African American 03/08/2017 >60.0  >60 mL/min/1.73 m^2 Final     eGFR if non African American 03/08/2017 >60.0  >60 mL/min/1.73 m^2 Final    Comment: Calculation used to obtain the estimated glomerular filtration  rate (eGFR) is the CKD-EPI equation. Since race is unknown   in our information system, the eGFR values for   -American and Non--American patients are given   for each creatinine result.      Magnesium 03/08/2017 1.7  1.6 - 2.6 mg/dL Final    Phosphorus 03/08/2017 3.4  2.7 - 4.5 mg/dL Final         Imaging:   CT 1/17/16  CT OF THE CHEST, ABDOMEN & PELVIS WITH IV CONTRAST:       Technique: CT examination of the chest abdomen and pelvis was obtained using 5 mm axial images after the administration of 100 cc of Omnipaque 350 IV and PO Contrast.  Noncontrast, arterial, portal venous, and delayed phase images were obtained.  Coronal and sagittal reformats were obtained.    Comparison: CT chest abdomen pelvis from 10/25/2016     Clinical Indication:  Pancreatic mass    Results:    Chest:  Stable left-sided chest port with its tip in the innominate vein, unchanged.  Normal thyroid.  Thoracic aorta is normal in caliber and contour.  Trace pericardial effusion.  No cardiomegaly.  No evidence of mediastinal, hilar, or axillary lymphadenopathy.    The trachea and bronchi are patent.  The lungs demonstrate innumerable pulmonary nodules.  These nodules are stable in size and number, however multiple nodules appear more solid when compared to prior exam.  Nodule in the right middle lobe measures 7 mm, unchanged in size, however this nodule appears more dense when compared to prior exam (series 3, image 89).  Interval increase in right small pleural effusion.  No left-sided pleural effusion.    Abdomen/pelvis:  Liver is normal in size and contour.  No focal hepatic lesions.  Gallbladder is normal in appearance.  Portal vein is patent.    Relatively stable hypodense mass in the tail of the pancreas measuring 5.5 cm, previously 6.3 cm (series 3, image 115).  Slight  difference in size is likely secondary to slight differences in technique.  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  This mass abuts the left kidney, unchanged when compared to prior exam.  Left adrenal gland is not visualized and is likely involved. Collateral vessels are noted in the left upper quadrant.    Remaining portion of the pancreas is normal in appearance.  Spleen is normal in appearance.  Right adrenal gland is normal in appearance.  Right kidney is normal in appearance.  Urinary bladder is normal.  Uterus is normal in appearance.  Partially cystic structure measuring 4.2 x 3.1 cm in the right adnexal region.  This may represent a right complex adnexal cyst.  Left adnexa is normal in appearance.    The small bowel is normal in caliber.  No evidence of obstruction.  Moderate amount of stool throughout the colon.    No evidence of lymphadenopathy.    Osseous structures: Sclerotic lesion of T11 appears slightly larger in size when compared to prior examination.  Stable sclerosis of the L5 vertebral body and T9 vertebral body.  Stable sclerotic lesion of the right iliac bone.  No new sclerotic lesions.   Impression        1.  Stable hypodense mass in the pancreatic tail which occludes the splenic vein and may involve the left kidney.  Findings are not significantly changed when compared to prior exam.    2.  Innumerable pulmonary nodules which are stable in size and number, however some nodules appear more dense when compared to prior exam.    3.  Multiple stable sclerotic foci scattered throughout the osseous structures consistent with metastatic disease.  The sclerotic area in the T11 vertebral body is slightly more prominent when compared to prior exam.    4.  4.2 x 3.1 CM cystic lesion in the right adnexa which may represent a mildly complicated cyst.  Further evaluation with pelvic ultrasound is recommended.    5.  Slight increase in size of small right pleural effusion.               Assessment:       1. Malignant neoplasm of tail of pancreas    2. Pancreatic cancer metastasized to liver           Plan:     I have had a long conversation with Mrs. Franz and her  today concerning further treatment.  I do not think, given her performance status (ECOG PS 3) that further systemic therapy would be an option as it is unlikely to be beneficial and most likely to be detrimental given side effects.  She is having worsening pain despite the use of methadone.  I will recheck out to palliative care to see if they can assist with her pain management.  I have also recommended hospice and discussed with this means.  They are agreeable with this and we will get this set up.  I have told them that we would be happy to answer any other questions moving forward them to keep us updated.    Sebas Zaman DO, FACP  Hematology & Oncology  [facility] [place] [address]  ph. [phone]  Fax. [phone]    40 minutes were spent in coordination of patient's care, record review and counseling.  More than 50% of the time was face-to-face.

## 2024-03-20 NOTE — SUBJECTIVE & OBJECTIVE
Subjective:     Interval History: No new complaints.    Review of Systems   Constitutional: Negative for appetite change.   HENT: Negative for trouble swallowing.    Eyes: Negative for photophobia.   Respiratory: Negative for cough and shortness of breath.    Cardiovascular: Negative for palpitations.   Gastrointestinal: Positive for abdominal pain.        See HPI for details   Genitourinary: Negative for frequency and hematuria.   Skin: Negative for rash.   Neurological: Negative for weakness and headaches.   Psychiatric/Behavioral: Negative for behavioral problems and suicidal ideas.     Objective:     Vital Signs (Most Recent):  Temp: 98.4 °F (36.9 °C) (01/28/17 2018)  Pulse: 78 (01/28/17 2018)  Resp: 20 (01/28/17 2018)  BP: (!) 156/77 (01/28/17 2018)  SpO2: 98 % (01/28/17 2024) Vital Signs (24h Range):  Temp:  [96.8 °F (36 °C)-98.4 °F (36.9 °C)] 98.4 °F (36.9 °C)  Pulse:  [64-89] 78  Resp:  [17-24] 20  SpO2:  [97 %-99 %] 98 %  BP: (139-179)/(73-87) 156/77     Weight: 71.6 kg (157 lb 12.8 oz) (01/28/17 0500)  Body mass index is 27.09 kg/(m^2).      Intake/Output Summary (Last 24 hours) at 01/28/17 2200  Last data filed at 01/28/17 1830   Gross per 24 hour   Intake           623.33 ml   Output             2050 ml   Net         -1426.67 ml       Lines/Drains/Airways     Central Venous Catheter Line                 Port A Cath Single Lumen left subclavian -- days         Port A Cath Single Lumen 04/01/16 0821 left subclavian 302 days         Port A Cath Single Lumen 01/28/17 1330 left subclavian less than 1 day          Drain                 NG/OG Tube 01/26/17 1510 16 Fr. Right nostril 2 days          Peripheral Intravenous Line                 Peripheral IV - Single Lumen 01/26/17 1034 Right Antecubital 2 days                Physical Exam   Eyes: Pupils are equal, round, and reactive to light.   Neck: No thyromegaly present.   Cardiovascular: Normal rate, regular rhythm and normal heart sounds.     Pulmonary/Chest: Effort normal and breath sounds normal.   Abdominal: Soft. She exhibits no mass. There is tenderness. There is no rebound and no guarding.   Musculoskeletal: She exhibits no tenderness.   Psychiatric: Her behavior is normal. Thought content normal.        [Alert] : alert [Appropriately responsive] : appropriately responsive [No Acute Distress] : no acute distress [Regular Rate Rhythm] : regular rate rhythm [No Lymphadenopathy] : no lymphadenopathy [No Murmurs] : no murmurs [Clear to Auscultation B/L] : clear to auscultation bilaterally [Soft] : soft [Non-tender] : non-tender [Non-distended] : non-distended [No HSM] : No HSM [No Lesions] : no lesions [FreeTextEntry6] : Dense, fibrocystic [Oriented x3] : oriented x3 [FreeTextEntry7] : scarring from abdominoplasty [Breast Palpation Diffuse Fibrous Tissue Bilateral] : fibrocystic changes [Examination Of The Breasts] : a normal appearance [No Masses] : no breast masses were palpable [Labia Majora] : normal [Labia Minora] : normal [Uterine Adnexae] : normal [Normal] : normal